# Patient Record
Sex: MALE | Race: WHITE | NOT HISPANIC OR LATINO | URBAN - METROPOLITAN AREA
[De-identification: names, ages, dates, MRNs, and addresses within clinical notes are randomized per-mention and may not be internally consistent; named-entity substitution may affect disease eponyms.]

---

## 2017-06-19 ENCOUNTER — INPATIENT (INPATIENT)
Facility: HOSPITAL | Age: 37
LOS: 0 days | Discharge: HOME | End: 2017-06-20
Attending: HOSPITALIST

## 2017-06-19 DIAGNOSIS — K44.9 DIAPHRAGMATIC HERNIA WITHOUT OBSTRUCTION OR GANGRENE: ICD-10-CM

## 2017-06-19 DIAGNOSIS — F13.10 SEDATIVE, HYPNOTIC OR ANXIOLYTIC ABUSE, UNCOMPLICATED: ICD-10-CM

## 2017-06-19 DIAGNOSIS — F17.200 NICOTINE DEPENDENCE, UNSPECIFIED, UNCOMPLICATED: ICD-10-CM

## 2017-06-19 DIAGNOSIS — K25.4 CHRONIC OR UNSPECIFIED GASTRIC ULCER WITH HEMORRHAGE: ICD-10-CM

## 2017-06-19 DIAGNOSIS — F11.20 OPIOID DEPENDENCE, UNCOMPLICATED: ICD-10-CM

## 2017-06-19 DIAGNOSIS — K27.9 PEPTIC ULCER, SITE UNSPECIFIED, UNSPECIFIED AS ACUTE OR CHRONIC, WITHOUT HEMORRHAGE OR PERFORATION: ICD-10-CM

## 2017-06-19 DIAGNOSIS — F19.21 OTHER PSYCHOACTIVE SUBSTANCE DEPENDENCE, IN REMISSION: ICD-10-CM

## 2017-06-19 DIAGNOSIS — K29.70 GASTRITIS, UNSPECIFIED, WITHOUT BLEEDING: ICD-10-CM

## 2017-08-17 ENCOUNTER — EMERGENCY (EMERGENCY)
Facility: HOSPITAL | Age: 37
LOS: 0 days | Discharge: HOME | End: 2017-08-17
Admitting: INTERNAL MEDICINE

## 2017-08-17 DIAGNOSIS — Z87.19 PERSONAL HISTORY OF OTHER DISEASES OF THE DIGESTIVE SYSTEM: ICD-10-CM

## 2017-08-17 DIAGNOSIS — F17.200 NICOTINE DEPENDENCE, UNSPECIFIED, UNCOMPLICATED: ICD-10-CM

## 2017-08-17 DIAGNOSIS — Z79.899 OTHER LONG TERM (CURRENT) DRUG THERAPY: ICD-10-CM

## 2017-08-17 DIAGNOSIS — F13.10 SEDATIVE, HYPNOTIC OR ANXIOLYTIC ABUSE, UNCOMPLICATED: ICD-10-CM

## 2017-08-17 DIAGNOSIS — F11.20 OPIOID DEPENDENCE, UNCOMPLICATED: ICD-10-CM

## 2017-08-17 DIAGNOSIS — K27.9 PEPTIC ULCER, SITE UNSPECIFIED, UNSPECIFIED AS ACUTE OR CHRONIC, WITHOUT HEMORRHAGE OR PERFORATION: ICD-10-CM

## 2017-08-17 DIAGNOSIS — R11.2 NAUSEA WITH VOMITING, UNSPECIFIED: ICD-10-CM

## 2017-08-17 DIAGNOSIS — K29.70 GASTRITIS, UNSPECIFIED, WITHOUT BLEEDING: ICD-10-CM

## 2017-08-17 DIAGNOSIS — K44.9 DIAPHRAGMATIC HERNIA WITHOUT OBSTRUCTION OR GANGRENE: ICD-10-CM

## 2017-08-17 DIAGNOSIS — K92.1 MELENA: ICD-10-CM

## 2017-08-22 ENCOUNTER — INPATIENT (INPATIENT)
Facility: HOSPITAL | Age: 37
LOS: 1 days | Discharge: HOME | End: 2017-08-24
Attending: INTERNAL MEDICINE | Admitting: INTERNAL MEDICINE

## 2017-08-22 DIAGNOSIS — F17.200 NICOTINE DEPENDENCE, UNSPECIFIED, UNCOMPLICATED: ICD-10-CM

## 2017-08-22 DIAGNOSIS — K27.9 PEPTIC ULCER, SITE UNSPECIFIED, UNSPECIFIED AS ACUTE OR CHRONIC, WITHOUT HEMORRHAGE OR PERFORATION: ICD-10-CM

## 2017-08-22 DIAGNOSIS — K44.9 DIAPHRAGMATIC HERNIA WITHOUT OBSTRUCTION OR GANGRENE: ICD-10-CM

## 2017-08-22 DIAGNOSIS — F11.20 OPIOID DEPENDENCE, UNCOMPLICATED: ICD-10-CM

## 2017-08-22 DIAGNOSIS — K29.70 GASTRITIS, UNSPECIFIED, WITHOUT BLEEDING: ICD-10-CM

## 2017-08-22 DIAGNOSIS — F13.10 SEDATIVE, HYPNOTIC OR ANXIOLYTIC ABUSE, UNCOMPLICATED: ICD-10-CM

## 2017-08-29 DIAGNOSIS — K22.70 BARRETT'S ESOPHAGUS WITHOUT DYSPLASIA: ICD-10-CM
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
## 2017-08-29 DIAGNOSIS — F11.20 OPIOID DEPENDENCE, UNCOMPLICATED: ICD-10-CM

## 2017-08-29 DIAGNOSIS — K29.51 UNSPECIFIED CHRONIC GASTRITIS WITH BLEEDING: ICD-10-CM

## 2017-08-29 DIAGNOSIS — F41.8 OTHER SPECIFIED ANXIETY DISORDERS: ICD-10-CM

## 2017-08-29 DIAGNOSIS — Z87.11 PERSONAL HISTORY OF PEPTIC ULCER DISEASE: ICD-10-CM

## 2017-08-29 DIAGNOSIS — I95.2 HYPOTENSION DUE TO DRUGS: ICD-10-CM

## 2017-08-29 DIAGNOSIS — D50.0 IRON DEFICIENCY ANEMIA SECONDARY TO BLOOD LOSS (CHRONIC): ICD-10-CM

## 2017-08-29 DIAGNOSIS — T42.8X1A POISONING BY ANTIPARKINSONISM DRUGS AND OTHER CENTRAL MUSCLE-TONE DEPRESSANTS, ACCIDENTAL (UNINTENTIONAL), INITIAL ENCOUNTER: ICD-10-CM

## 2017-08-29 DIAGNOSIS — K44.9 DIAPHRAGMATIC HERNIA WITHOUT OBSTRUCTION OR GANGRENE: ICD-10-CM

## 2017-08-29 DIAGNOSIS — M54.9 DORSALGIA, UNSPECIFIED: ICD-10-CM

## 2017-08-29 DIAGNOSIS — T39.315A ADVERSE EFFECT OF PROPIONIC ACID DERIVATIVES, INITIAL ENCOUNTER: ICD-10-CM

## 2017-08-29 DIAGNOSIS — G89.4 CHRONIC PAIN SYNDROME: ICD-10-CM

## 2017-08-31 ENCOUNTER — EMERGENCY (EMERGENCY)
Facility: HOSPITAL | Age: 37
LOS: 1 days | Discharge: HOME | End: 2017-08-31
Attending: EMERGENCY MEDICINE | Admitting: EMERGENCY MEDICINE
Payer: MEDICAID

## 2017-08-31 VITALS
TEMPERATURE: 99 F | SYSTOLIC BLOOD PRESSURE: 140 MMHG | RESPIRATION RATE: 16 BRPM | DIASTOLIC BLOOD PRESSURE: 91 MMHG | OXYGEN SATURATION: 100 % | HEART RATE: 120 BPM

## 2017-08-31 LAB
ALBUMIN SERPL ELPH-MCNC: 3.7 G/DL — SIGNIFICANT CHANGE UP (ref 3.3–5)
ALP SERPL-CCNC: 49 U/L — SIGNIFICANT CHANGE UP (ref 40–120)
ALT FLD-CCNC: 28 U/L — SIGNIFICANT CHANGE UP (ref 4–41)
APAP SERPL-MCNC: < 15 UG/ML — LOW (ref 15–25)
AST SERPL-CCNC: 19 U/L — SIGNIFICANT CHANGE UP (ref 4–40)
BARBITURATES MEASUREMENT: NEGATIVE — SIGNIFICANT CHANGE UP
BASOPHILS # BLD AUTO: 0.08 K/UL — SIGNIFICANT CHANGE UP (ref 0–0.2)
BASOPHILS NFR BLD AUTO: 0.7 % — SIGNIFICANT CHANGE UP (ref 0–2)
BENZODIAZ SERPL-MCNC: NEGATIVE — SIGNIFICANT CHANGE UP
BILIRUB SERPL-MCNC: 0.2 MG/DL — SIGNIFICANT CHANGE UP (ref 0.2–1.2)
BUN SERPL-MCNC: 17 MG/DL — SIGNIFICANT CHANGE UP (ref 7–23)
CALCIUM SERPL-MCNC: 9.4 MG/DL — SIGNIFICANT CHANGE UP (ref 8.4–10.5)
CHLORIDE SERPL-SCNC: 105 MMOL/L — SIGNIFICANT CHANGE UP (ref 98–107)
CK MB BLD-MCNC: 1.01 NG/ML — SIGNIFICANT CHANGE UP (ref 1–6.6)
CK SERPL-CCNC: 138 U/L — SIGNIFICANT CHANGE UP (ref 30–200)
CO2 SERPL-SCNC: 22 MMOL/L — SIGNIFICANT CHANGE UP (ref 22–31)
CREAT SERPL-MCNC: 1.1 MG/DL — SIGNIFICANT CHANGE UP (ref 0.5–1.3)
EOSINOPHIL # BLD AUTO: 0.11 K/UL — SIGNIFICANT CHANGE UP (ref 0–0.5)
EOSINOPHIL NFR BLD AUTO: 1 % — SIGNIFICANT CHANGE UP (ref 0–6)
ETHANOL BLD-MCNC: < 10 MG/DL — SIGNIFICANT CHANGE UP
GLUCOSE SERPL-MCNC: 106 MG/DL — HIGH (ref 70–99)
HCT VFR BLD CALC: 36.7 % — LOW (ref 39–50)
HGB BLD-MCNC: 11.1 G/DL — LOW (ref 13–17)
IMM GRANULOCYTES # BLD AUTO: 0.09 # — SIGNIFICANT CHANGE UP
IMM GRANULOCYTES NFR BLD AUTO: 0.8 % — SIGNIFICANT CHANGE UP (ref 0–1.5)
LYMPHOCYTES # BLD AUTO: 26.9 % — SIGNIFICANT CHANGE UP (ref 13–44)
LYMPHOCYTES # BLD AUTO: 3.08 K/UL — SIGNIFICANT CHANGE UP (ref 1–3.3)
MCHC RBC-ENTMCNC: 23.5 PG — LOW (ref 27–34)
MCHC RBC-ENTMCNC: 30.2 % — LOW (ref 32–36)
MCV RBC AUTO: 77.6 FL — LOW (ref 80–100)
MONOCYTES # BLD AUTO: 1.24 K/UL — HIGH (ref 0–0.9)
MONOCYTES NFR BLD AUTO: 10.8 % — SIGNIFICANT CHANGE UP (ref 2–14)
NEUTROPHILS # BLD AUTO: 6.87 K/UL — SIGNIFICANT CHANGE UP (ref 1.8–7.4)
NEUTROPHILS NFR BLD AUTO: 59.8 % — SIGNIFICANT CHANGE UP (ref 43–77)
NRBC # FLD: 0 — SIGNIFICANT CHANGE UP
PLATELET # BLD AUTO: 459 K/UL — HIGH (ref 150–400)
PMV BLD: 11.3 FL — SIGNIFICANT CHANGE UP (ref 7–13)
POTASSIUM SERPL-MCNC: 4 MMOL/L — SIGNIFICANT CHANGE UP (ref 3.5–5.3)
POTASSIUM SERPL-SCNC: 4 MMOL/L — SIGNIFICANT CHANGE UP (ref 3.5–5.3)
PROT SERPL-MCNC: 6.6 G/DL — SIGNIFICANT CHANGE UP (ref 6–8.3)
RBC # BLD: 4.73 M/UL — SIGNIFICANT CHANGE UP (ref 4.2–5.8)
RBC # FLD: 18.9 % — HIGH (ref 10.3–14.5)
SALICYLATES SERPL-MCNC: < 5 MG/DL — LOW (ref 15–30)
SODIUM SERPL-SCNC: 142 MMOL/L — SIGNIFICANT CHANGE UP (ref 135–145)
TROPONIN T SERPL-MCNC: < 0.06 NG/ML — SIGNIFICANT CHANGE UP (ref 0–0.06)
WBC # BLD: 11.47 K/UL — HIGH (ref 3.8–10.5)
WBC # FLD AUTO: 11.47 K/UL — HIGH (ref 3.8–10.5)

## 2017-08-31 PROCEDURE — 99285 EMERGENCY DEPT VISIT HI MDM: CPT

## 2017-08-31 RX ORDER — SODIUM CHLORIDE 9 MG/ML
1000 INJECTION INTRAMUSCULAR; INTRAVENOUS; SUBCUTANEOUS ONCE
Qty: 0 | Refills: 0 | Status: COMPLETED | OUTPATIENT
Start: 2017-08-31 | End: 2017-08-31

## 2017-08-31 RX ADMIN — SODIUM CHLORIDE 1000 MILLILITER(S): 9 INJECTION INTRAMUSCULAR; INTRAVENOUS; SUBCUTANEOUS at 23:40

## 2017-08-31 NOTE — ED PROVIDER NOTE - PROGRESS NOTE DETAILS
Pt reports he is feeling well. d/c was pending on 2nd set of cardiac enzymes. Trop negative, EKG unchanged. Pt reports he has a safe way to transport home, will contact his  tomorrow to aid in proving a new treatment facility   Romina Rubin, PGY-1 EM

## 2017-08-31 NOTE — ED ADULT TRIAGE NOTE - CHIEF COMPLAINT QUOTE
Pt brought in by EMS, pt was denied from rehab center because of being tachycardic. Pt denies cp/discomfort, denies headache/dizziness. Pt denies sob breathing even and unlabored. Pt appears diaphoretic, mildly anxious.

## 2017-08-31 NOTE — ED ADULT NURSE NOTE - CHPI ED SYMPTOMS NEG
no nausea/no abdominal distension/no fever/no chills/no weakness/no disorientation/no pain/no vomiting/no confusion/no abdominal pain

## 2017-08-31 NOTE — ED PROVIDER NOTE - MEDICAL DECISION MAKING DETAILS
Pt is a 37 yo M longstanding hx of opoid abuse (recently finished methadone detox on 8/30) at [facility] in  with plans to start day program at [facility] today, was sent here by rehab center due to tachycardia on entrance vitals. 125 on  triage here, down to 97 now. Pt appears red in the face/anterior chest/neck but says this is his baseline. Does not appear to be in wd. Denies chest pain. EKG shows non specific T wave abnormalities with no prior for comparison.   -Cardiac enzymes, repeat EKG, cbc/cmp/tox/tsh  Romina Rubin, PGY-1 EM

## 2017-08-31 NOTE — ED ADULT NURSE NOTE - OBJECTIVE STATEMENT
Patient is bought in room 7, alert and oriented x 4, appears anxious, respirations even and unlabored, normal sinus rhythm/sinus tachycardia on cardiac monitor. Blood drawn and sent. Will follow up and monitor.

## 2017-08-31 NOTE — ED PROVIDER NOTE - ATTENDING CONTRIBUTION TO CARE
Martinez: 36 yom sent to ED for tachycardia from residential facility.  Pt recently underwent detox for heroin as inpatient but now none for 1 week.  PT states he feels ill but denies any cp, palpitations, confusion, shaking, abd pain, fever.  On exam, pt is awake, alert cooperative and pleasant.  Hr now decreased to 103.  PERRL, EOMI, clear lungs, normal cardiac, abd soft, no tenderness, slight tremor in hands but no asterixus.  Neuro intact.  EKG shows tachycardia and flipped t wavesc.  Labs, fluids, TSH, no evidence of withdrwawal.  REassess.

## 2017-08-31 NOTE — ED PROVIDER NOTE - OBJECTIVE STATEMENT
Pt is a 37 yo M longstanding hx of opoid abuse (recently finished methadone detox on 8/30) at [facility] in  with plans to start day program at [facility] today, was sent here by rehab center due to tachycardia on entrance vitals. Pt reports he was using 150-200 mg oxycodone/day every day for years. 1 prior trial of detox in February. Pt reports also using cocaine and heroin in the past but nothing for months. No Etoh use, daily nicotine. Reports that he feels generally unwell from the detox, stomach pains/yawning, but nothing that seems out of the ordinary. Reports he always feels hot but that this has been his baseline. Denies any chest pain, shortness of breath, fevers, headaches, or any other complaints. No heart hx or family heart hx.

## 2017-09-01 VITALS
DIASTOLIC BLOOD PRESSURE: 96 MMHG | TEMPERATURE: 98 F | HEART RATE: 82 BPM | SYSTOLIC BLOOD PRESSURE: 152 MMHG | RESPIRATION RATE: 21 BRPM | OXYGEN SATURATION: 100 %

## 2017-09-01 LAB
AMPHET UR-MCNC: NEGATIVE — SIGNIFICANT CHANGE UP
BARBITURATES UR SCN-MCNC: NEGATIVE — SIGNIFICANT CHANGE UP
BENZODIAZ UR-MCNC: NEGATIVE — SIGNIFICANT CHANGE UP
CANNABINOIDS UR-MCNC: NEGATIVE — SIGNIFICANT CHANGE UP
COCAINE METAB.OTHER UR-MCNC: NEGATIVE — SIGNIFICANT CHANGE UP
METHADONE UR-MCNC: POSITIVE — SIGNIFICANT CHANGE UP
OPIATES UR-MCNC: NEGATIVE — SIGNIFICANT CHANGE UP
OXYCODONE UR-MCNC: NEGATIVE — SIGNIFICANT CHANGE UP
PCP UR-MCNC: NEGATIVE — SIGNIFICANT CHANGE UP
TROPONIN T SERPL-MCNC: < 0.06 NG/ML — SIGNIFICANT CHANGE UP (ref 0–0.06)
TSH SERPL-MCNC: 0.88 UIU/ML — SIGNIFICANT CHANGE UP (ref 0.27–4.2)

## 2017-09-01 RX ADMIN — Medication 1 MILLIGRAM(S): at 01:00

## 2017-09-06 ENCOUNTER — INPATIENT (INPATIENT)
Facility: HOSPITAL | Age: 37
LOS: 7 days | Discharge: HOME | End: 2017-09-14
Attending: INTERNAL MEDICINE

## 2017-09-06 DIAGNOSIS — F17.200 NICOTINE DEPENDENCE, UNSPECIFIED, UNCOMPLICATED: ICD-10-CM

## 2017-09-06 DIAGNOSIS — K44.9 DIAPHRAGMATIC HERNIA WITHOUT OBSTRUCTION OR GANGRENE: ICD-10-CM

## 2017-09-06 DIAGNOSIS — F11.20 OPIOID DEPENDENCE, UNCOMPLICATED: ICD-10-CM

## 2017-09-06 DIAGNOSIS — K27.9 PEPTIC ULCER, SITE UNSPECIFIED, UNSPECIFIED AS ACUTE OR CHRONIC, WITHOUT HEMORRHAGE OR PERFORATION: ICD-10-CM

## 2017-09-06 DIAGNOSIS — F13.10 SEDATIVE, HYPNOTIC OR ANXIOLYTIC ABUSE, UNCOMPLICATED: ICD-10-CM

## 2017-09-06 DIAGNOSIS — K29.70 GASTRITIS, UNSPECIFIED, WITHOUT BLEEDING: ICD-10-CM

## 2017-09-17 DIAGNOSIS — K29.70 GASTRITIS, UNSPECIFIED, WITHOUT BLEEDING: ICD-10-CM

## 2017-09-17 DIAGNOSIS — K21.9 GASTRO-ESOPHAGEAL REFLUX DISEASE WITHOUT ESOPHAGITIS: ICD-10-CM

## 2017-09-17 DIAGNOSIS — F11.20 OPIOID DEPENDENCE, UNCOMPLICATED: ICD-10-CM

## 2017-09-17 DIAGNOSIS — K44.9 DIAPHRAGMATIC HERNIA WITHOUT OBSTRUCTION OR GANGRENE: ICD-10-CM

## 2017-09-17 DIAGNOSIS — K27.9 PEPTIC ULCER, SITE UNSPECIFIED, UNSPECIFIED AS ACUTE OR CHRONIC, WITHOUT HEMORRHAGE OR PERFORATION: ICD-10-CM

## 2017-09-17 DIAGNOSIS — F13.20 SEDATIVE, HYPNOTIC OR ANXIOLYTIC DEPENDENCE, UNCOMPLICATED: ICD-10-CM

## 2017-09-17 DIAGNOSIS — Z51.89 ENCOUNTER FOR OTHER SPECIFIED AFTERCARE: ICD-10-CM

## 2017-09-17 DIAGNOSIS — F17.210 NICOTINE DEPENDENCE, CIGARETTES, UNCOMPLICATED: ICD-10-CM

## 2019-10-01 ENCOUNTER — APPOINTMENT (OUTPATIENT)
Dept: HUMAN REPRODUCTION | Facility: CLINIC | Age: 39
End: 2019-10-01

## 2020-09-04 ENCOUNTER — EMERGENCY (EMERGENCY)
Facility: HOSPITAL | Age: 40
LOS: 0 days | Discharge: HOME | End: 2020-09-05
Attending: EMERGENCY MEDICINE | Admitting: EMERGENCY MEDICINE
Payer: MEDICAID

## 2020-09-04 VITALS
RESPIRATION RATE: 18 BRPM | HEART RATE: 110 BPM | TEMPERATURE: 98 F | DIASTOLIC BLOOD PRESSURE: 67 MMHG | OXYGEN SATURATION: 100 % | SYSTOLIC BLOOD PRESSURE: 118 MMHG

## 2020-09-04 VITALS — OXYGEN SATURATION: 95 % | RESPIRATION RATE: 17 BRPM

## 2020-09-04 DIAGNOSIS — F11.10 OPIOID ABUSE, UNCOMPLICATED: ICD-10-CM

## 2020-09-04 DIAGNOSIS — T40.1X1A POISONING BY HEROIN, ACCIDENTAL (UNINTENTIONAL), INITIAL ENCOUNTER: ICD-10-CM

## 2020-09-04 LAB
ALBUMIN SERPL ELPH-MCNC: 4.1 G/DL — SIGNIFICANT CHANGE UP (ref 3.5–5.2)
ALP SERPL-CCNC: 69 U/L — SIGNIFICANT CHANGE UP (ref 30–115)
ALT FLD-CCNC: 19 U/L — SIGNIFICANT CHANGE UP (ref 0–41)
ANION GAP SERPL CALC-SCNC: 15 MMOL/L — HIGH (ref 7–14)
AST SERPL-CCNC: 23 U/L — SIGNIFICANT CHANGE UP (ref 0–41)
BASOPHILS # BLD AUTO: 0.07 K/UL — SIGNIFICANT CHANGE UP (ref 0–0.2)
BASOPHILS NFR BLD AUTO: 0.8 % — SIGNIFICANT CHANGE UP (ref 0–1)
BILIRUB SERPL-MCNC: <0.2 MG/DL — SIGNIFICANT CHANGE UP (ref 0.2–1.2)
BUN SERPL-MCNC: 15 MG/DL — SIGNIFICANT CHANGE UP (ref 10–20)
CALCIUM SERPL-MCNC: 8.7 MG/DL — SIGNIFICANT CHANGE UP (ref 8.5–10.1)
CHLORIDE SERPL-SCNC: 95 MMOL/L — LOW (ref 98–110)
CK SERPL-CCNC: 324 U/L — HIGH (ref 0–225)
CO2 SERPL-SCNC: 27 MMOL/L — SIGNIFICANT CHANGE UP (ref 17–32)
CREAT SERPL-MCNC: 1.3 MG/DL — SIGNIFICANT CHANGE UP (ref 0.7–1.5)
EOSINOPHIL # BLD AUTO: 0.23 K/UL — SIGNIFICANT CHANGE UP (ref 0–0.7)
EOSINOPHIL NFR BLD AUTO: 2.6 % — SIGNIFICANT CHANGE UP (ref 0–8)
GLUCOSE SERPL-MCNC: 135 MG/DL — HIGH (ref 70–99)
HCT VFR BLD CALC: 31 % — LOW (ref 42–52)
HGB BLD-MCNC: 9.1 G/DL — LOW (ref 14–18)
IMM GRANULOCYTES NFR BLD AUTO: 1.2 % — HIGH (ref 0.1–0.3)
LYMPHOCYTES # BLD AUTO: 1.31 K/UL — SIGNIFICANT CHANGE UP (ref 1.2–3.4)
LYMPHOCYTES # BLD AUTO: 15.1 % — LOW (ref 20.5–51.1)
MAGNESIUM SERPL-MCNC: 1.9 MG/DL — SIGNIFICANT CHANGE UP (ref 1.8–2.4)
MCHC RBC-ENTMCNC: 23.5 PG — LOW (ref 27–31)
MCHC RBC-ENTMCNC: 29.4 G/DL — LOW (ref 32–37)
MCV RBC AUTO: 80.1 FL — SIGNIFICANT CHANGE UP (ref 80–94)
MONOCYTES # BLD AUTO: 0.3 K/UL — SIGNIFICANT CHANGE UP (ref 0.1–0.6)
MONOCYTES NFR BLD AUTO: 3.5 % — SIGNIFICANT CHANGE UP (ref 1.7–9.3)
NEUTROPHILS # BLD AUTO: 6.67 K/UL — HIGH (ref 1.4–6.5)
NEUTROPHILS NFR BLD AUTO: 76.8 % — HIGH (ref 42.2–75.2)
NRBC # BLD: 0 /100 WBCS — SIGNIFICANT CHANGE UP (ref 0–0)
PLATELET # BLD AUTO: 366 K/UL — SIGNIFICANT CHANGE UP (ref 130–400)
POTASSIUM SERPL-MCNC: 3.8 MMOL/L — SIGNIFICANT CHANGE UP (ref 3.5–5)
POTASSIUM SERPL-SCNC: 3.8 MMOL/L — SIGNIFICANT CHANGE UP (ref 3.5–5)
PROT SERPL-MCNC: 6.2 G/DL — SIGNIFICANT CHANGE UP (ref 6–8)
RBC # BLD: 3.87 M/UL — LOW (ref 4.7–6.1)
RBC # FLD: 14.8 % — HIGH (ref 11.5–14.5)
SODIUM SERPL-SCNC: 137 MMOL/L — SIGNIFICANT CHANGE UP (ref 135–146)
TROPONIN T SERPL-MCNC: <0.01 NG/ML — SIGNIFICANT CHANGE UP
WBC # BLD: 8.68 K/UL — SIGNIFICANT CHANGE UP (ref 4.8–10.8)
WBC # FLD AUTO: 8.68 K/UL — SIGNIFICANT CHANGE UP (ref 4.8–10.8)

## 2020-09-04 PROCEDURE — 71045 X-RAY EXAM CHEST 1 VIEW: CPT | Mod: 26

## 2020-09-04 PROCEDURE — 99285 EMERGENCY DEPT VISIT HI MDM: CPT

## 2020-09-04 PROCEDURE — 93010 ELECTROCARDIOGRAM REPORT: CPT

## 2020-09-04 RX ORDER — SODIUM CHLORIDE 9 MG/ML
1000 INJECTION, SOLUTION INTRAVENOUS ONCE
Refills: 0 | Status: COMPLETED | OUTPATIENT
Start: 2020-09-04 | End: 2020-09-04

## 2020-09-04 RX ADMIN — SODIUM CHLORIDE 1000 MILLILITER(S): 9 INJECTION, SOLUTION INTRAVENOUS at 22:26

## 2020-09-04 NOTE — ED ADULT NURSE NOTE - OBJECTIVE STATEMENT
Pt was BIBA s/o overdose at home. Pt was found in bathroom unresponsive, 2 4mg narcan given in the field. Pt responsive on arrival, o2 sat 87% on RA. Pt denies pmh and nkda.

## 2020-09-04 NOTE — ED PROVIDER NOTE - NSFOLLOWUPINSTRUCTIONS_ED_ALL_ED_FT
Opioid Overdose      Opioids are substances that relieve pain by binding to pain receptors in your brain and spinal cord. Opioids include illegal drugs, such as heroin, as well as prescription pain medicines. An opioid overdose happens when you take too much of an opioid substance. This can happen with any type of opioid, including:    Heroin.  Morphine.  Codeine.  Methadone.  Oxycodone.  Hydrocodone.  Fentanyl.  Hydromorphone.  Buprenorphine.    The effects of an overdose can be mild, dangerous, or even deadly. Opioid overdose is a medical emergency.      What are the causes?    This condition may be caused by:    Taking too much of an opioid by accident.  Taking too much of an opioid on purpose.  An error made by a health care provider who prescribes a medicine.  An error made by the pharmacist who fills the prescription order.  Using more than one substance that contains opioids at the same time.  Mixing an opioid with a substance that affects your heart, breathing, or blood pressure. These include alcohol, tranquilizers, sleeping pills, illegal drugs, and some over-the-counter medicines.      What increases the risk?    This condition is more likely in:    Children. They may be attracted to colorful pills. Because of a child's small size, even a small amount of a drug can be dangerous.  Elderly people. They may be taking many different drugs. Elderly people may have difficulty reading labels or remembering when they last took their medicine.  People who take an opioid on a long-term basis.    People who use:    Illegal drugs.  Other substances, including alcohol, while using an opioid.    People who have:    A history of drug or alcohol abuse.  Certain mental health conditions.  People who take opioids that are not prescribed for them.      What are the signs or symptoms?    Symptoms of this condition depend on the type of opioid and the amount that was taken. Common symptoms include:    Sleepiness or difficulty waking from sleep.  Confusion.  Slurred speech.  Slowed breathing and a slow pulse.  Nausea and vomiting.  Abnormally small pupils.    Signs and symptoms that require emergency treatment include:    Cold, clammy, and pale skin.  Blue lips and fingernails.  Vomiting.  Gurgling sounds in the throat.  A pulse that is very slow or difficult to detect.  Breathing that is very slow, noisy, or difficult to detect.  Limp body.  Inability to respond to speech or be awakened from sleep (stupor).      How is this diagnosed?    This condition is diagnosed based on your symptoms. It is important to tell your health care provider:    All of the opioids that you took.  When you took the opioids.  Whether you were drinking alcohol or using other substances.    Your health care provider will do a physical exam. This exam may include:    Checking and monitoring your heart rate and rhythm, your breathing rate and depth, your temperature, and your blood pressure (vital signs).  Checking for abnormally small pupils.  Measuring oxygen levels in your blood.  You may also have blood tests or urine tests.    How is this treated?    Supporting your vital signs and your breathing is the first step in treating an opioid overdose. Treatment may also include:    Giving fluids and minerals (electrolytes) through an IV tube.  Inserting a breathing tube (endotracheal tube) in your airway to help you breathe.  Giving oxygen.  Passing a tube through your nose and into your stomach (NG tube, or nasogastric tube) to wash out your stomach.    Giving medicines that:    Increase your blood pressure.  Absorb any opioid that is in your digestive system.  Reverse the effects of the opioid (naloxone).  Ongoing counseling and mental health support if you intentionally overdosed or used an illegal drug.    Follow these instructions at home:     Take over-the-counter and prescription medicines only as told by your health care provider. Always ask your health care provider about possible side effects and interactions of any new medicine that you start taking.  Keep a list of all of the medicines that you take, including over-the-counter medicines. Bring this list with you to all of your medical visits.  Drink enough fluid to keep your urine clear or pale yellow.  Keep all follow-up visits as told by your health care provider. This is important.    How is this prevented?    Get help if you are struggling with:    Alcohol or drug use.  Depression or another mental health problem.  Keep the phone number of your local poison control center near your phone or on your cell phone.  Store all medicines in safety containers that are out of the reach of children.  Read the drug inserts that come with your medicines.  Do not drink alcohol when taking opioids.  Do not use illegal drugs.  Do not take opioid medicines that are not prescribed for you.    Contact a health care provider if:    Your symptoms return.  You develop new symptoms or side effects when you are taking medicines.    Get help right away if:    You think that you or someone else may have taken too much of an opioid. The hotline of the National Poison Control Center is (675) 591-3550.  You or someone else is having symptoms of an opioid overdose.  You have serious thoughts about hurting yourself or others.    You have:    Chest pain.  Difficulty breathing.  A loss of consciousness.  Opioid overdose is an emergency. Do not wait to see if the symptoms will go away. Get medical help right away. Call your local emergency services (911 in the [place] Do not drive yourself to the hospital.     This information is not intended to replace advice given to you by your health care provider. Make sure you discuss any questions you have with your health care provider. Substance Abuse    Chemical dependency is an addiction to drugs or alcohol. It is characterized by the repeated behavior of seeking out and using drugs and alcohol despite harmful consequences to the health and safety of oneself and others. Using drugs in a manner that brought you to an Emergency Room suggests you may have an drug abuse problem. Seek help at a drug addiction center.    SEEK IMMEDIATE MEDICAL CARE IF YOU HAVE ANY OF THE FOLLOWING SYMPTOMS: chest pain, shortness of breath, change in mental status, thoughts about hurting killing yourself, thoughts about hurting or killing somebody else, hallucinations, or worsening depression.

## 2020-09-04 NOTE — ED PROVIDER NOTE - PROGRESS NOTE DETAILS
[facility] called. Pt feeling great - eating/walking without difficulty or desaturation - wants to go home - discussed sobriety and detox

## 2020-09-04 NOTE — ED PROVIDER NOTE - PHYSICAL EXAMINATION
Vital Signs: I have reviewed the initial vital signs.  Constitutional: well-nourished, appears stated age, no acute distress.  HEENT: Airway patent, protected.  EOMI, PERRLA.  CV: regular rate, regular rhythm, well-perfused extremities, 2+ b/l DP and radial pulses equal.  Lungs: BCTA, no increased WOB.  ABD: soft, NTND, no guarding or rebound, no pulsatile mass, no hernias.   MSK: Neck supple, nontender, nl ROM, no stepoff. Chest nontender. Back nontender in TLS spine or to b/l bony structures or flanks. Ext nontender, nl rom, no deformity.   INTEG: Skin warm, dry, no rash.  NEURO: A&Ox3, moving all extremities, tired speech  PSYCH: Calm, cooperative, normal affect and interaction.

## 2020-09-04 NOTE — ED PROVIDER NOTE - ATTENDING CONTRIBUTION TO CARE
Pt is a 38yo male who comes in s/p non-fatal opioid overdose from 2 bags heroin - unconsciousness with sonorous breaths witnessed by roommate - EMS arrived - pt blue with pulse and responded after 2mg IN then 2mg IV narcan.  Now awake without any complaints.  States he last used Methadone 3 years ago.  Denies any other drug use.    Exam: RRR, CTAB, soft NT abdomen, normal neuro exam, cap reifll <2s, NAD  Plan: labs, xr, ekg

## 2020-09-04 NOTE — ED PROVIDER NOTE - CARE PLAN
Principal Discharge DX:	Heroin abuse Principal Discharge DX:	Opioid overdose, accidental or unintentional, initial encounter  Secondary Diagnosis:	Opioid use disorder

## 2020-09-04 NOTE — ED PROVIDER NOTE - NSFOLLOWUPCLINICS_GEN_ALL_ED_FT
[facility]  Detox Mgmt  [address] [place] [address]  Phone: [phone]  Fax:   Follow Up Time: 1-3 Days

## 2020-09-04 NOTE — ED ADULT TRIAGE NOTE - CHIEF COMPLAINT QUOTE
Patient BIBEMS at home, found unresponsive. Narcan 2 IN and 2 IV given. patient alert and oriented in triage.

## 2020-09-04 NOTE — ED PROVIDER NOTE - OBJECTIVE STATEMENT
39M hx of opioid abuse previously on methadone presents with opioid overdose. patient notes he took 2 bags of heroin, was then found to be less responsive by his friend who called EMS, received 2mg narcan IN then 2mg narcan IM 39M hx of opioid abuse previously on methadone presents with opioid overdose. patient notes he took 2 bags of heroin, was then found to be less responsive by his friend who called EMS, who noted that patient's sats were "in the single digits," received 2mg narcan IN then 2mg narcan IM. Patient now awake, alert, not complaining of any chest pain/shortness of breath, only endorses mild light-headedness. Denies trauma/falls, no other drug use including cocaine/pcp/meth/marijuana.

## 2020-09-04 NOTE — ED PROVIDER NOTE - PATIENT PORTAL LINK FT
You can access the FollowMyHealth Patient Portal offered by [facility] by registering at the following website: [email or web address]. By joining [facility]’s FollowMyHealth portal, you will also be able to view your health information using other applications (apps) compatible with our system.

## 2021-05-20 ENCOUNTER — INPATIENT (INPATIENT)
Facility: HOSPITAL | Age: 41
LOS: 3 days | Discharge: HOME | End: 2021-05-24
Attending: INTERNAL MEDICINE | Admitting: INTERNAL MEDICINE
Payer: MEDICAID

## 2021-05-20 VITALS
HEART RATE: 88 BPM | DIASTOLIC BLOOD PRESSURE: 62 MMHG | SYSTOLIC BLOOD PRESSURE: 134 MMHG | RESPIRATION RATE: 16 BRPM | OXYGEN SATURATION: 100 % | WEIGHT: 229.94 LBS | TEMPERATURE: 99 F | HEIGHT: 72 IN

## 2021-05-20 LAB
ALBUMIN SERPL ELPH-MCNC: 4 G/DL — SIGNIFICANT CHANGE UP (ref 3.5–5.2)
ALP SERPL-CCNC: 64 U/L — SIGNIFICANT CHANGE UP (ref 30–115)
ALT FLD-CCNC: 13 U/L — SIGNIFICANT CHANGE UP (ref 0–41)
ANION GAP SERPL CALC-SCNC: 11 MMOL/L — SIGNIFICANT CHANGE UP (ref 7–14)
ANISOCYTOSIS BLD QL: SIGNIFICANT CHANGE UP
APTT BLD: 29.9 SEC — SIGNIFICANT CHANGE UP (ref 27–39.2)
AST SERPL-CCNC: 14 U/L — SIGNIFICANT CHANGE UP (ref 0–41)
BASOPHILS # BLD AUTO: 0.07 K/UL — SIGNIFICANT CHANGE UP (ref 0–0.2)
BASOPHILS # BLD AUTO: 0.34 K/UL — HIGH (ref 0–0.2)
BASOPHILS NFR BLD AUTO: 0.8 % — SIGNIFICANT CHANGE UP (ref 0–1)
BASOPHILS NFR BLD AUTO: 5.2 % — HIGH (ref 0–1)
BILIRUB SERPL-MCNC: <0.2 MG/DL — SIGNIFICANT CHANGE UP (ref 0.2–1.2)
BLD GP AB SCN SERPL QL: SIGNIFICANT CHANGE UP
BUN SERPL-MCNC: 15 MG/DL — SIGNIFICANT CHANGE UP (ref 10–20)
CALCIUM SERPL-MCNC: 8.4 MG/DL — LOW (ref 8.5–10.1)
CHLORIDE SERPL-SCNC: 102 MMOL/L — SIGNIFICANT CHANGE UP (ref 98–110)
CO2 SERPL-SCNC: 24 MMOL/L — SIGNIFICANT CHANGE UP (ref 17–32)
CREAT SERPL-MCNC: 0.8 MG/DL — SIGNIFICANT CHANGE UP (ref 0.7–1.5)
DACRYOCYTES BLD QL SMEAR: SLIGHT — SIGNIFICANT CHANGE UP
ELLIPTOCYTES BLD QL SMEAR: SLIGHT — SIGNIFICANT CHANGE UP
EOSINOPHIL # BLD AUTO: 0.05 K/UL — SIGNIFICANT CHANGE UP (ref 0–0.7)
EOSINOPHIL # BLD AUTO: 0.3 K/UL — SIGNIFICANT CHANGE UP (ref 0–0.7)
EOSINOPHIL NFR BLD AUTO: 0.8 % — SIGNIFICANT CHANGE UP (ref 0–8)
EOSINOPHIL NFR BLD AUTO: 3.6 % — SIGNIFICANT CHANGE UP (ref 0–8)
GIANT PLATELETS BLD QL SMEAR: PRESENT — SIGNIFICANT CHANGE UP
GLUCOSE SERPL-MCNC: 89 MG/DL — SIGNIFICANT CHANGE UP (ref 70–99)
HCT VFR BLD CALC: 21.5 % — LOW (ref 42–52)
HCT VFR BLD CALC: 29.2 % — LOW (ref 42–52)
HGB BLD-MCNC: 5.6 G/DL — CRITICAL LOW (ref 14–18)
HGB BLD-MCNC: 8.3 G/DL — LOW (ref 14–18)
HYPOCHROMIA BLD QL: SIGNIFICANT CHANGE UP
IMM GRANULOCYTES NFR BLD AUTO: 0.5 % — HIGH (ref 0.1–0.3)
INR BLD: 1.13 RATIO — SIGNIFICANT CHANGE UP (ref 0.65–1.3)
IRON SATN MFR SERPL: 22 UG/DL — LOW (ref 35–150)
IRON SATN MFR SERPL: 6 % — LOW (ref 15–50)
LYMPHOCYTES # BLD AUTO: 1.03 K/UL — LOW (ref 1.2–3.4)
LYMPHOCYTES # BLD AUTO: 15.5 % — LOW (ref 20.5–51.1)
LYMPHOCYTES # BLD AUTO: 2.31 K/UL — SIGNIFICANT CHANGE UP (ref 1.2–3.4)
LYMPHOCYTES # BLD AUTO: 28 % — SIGNIFICANT CHANGE UP (ref 20.5–51.1)
MACROCYTES BLD QL: SIGNIFICANT CHANGE UP
MANUAL SMEAR VERIFICATION: SIGNIFICANT CHANGE UP
MCHC RBC-ENTMCNC: 16.2 PG — LOW (ref 27–31)
MCHC RBC-ENTMCNC: 19.4 PG — LOW (ref 27–31)
MCHC RBC-ENTMCNC: 26 G/DL — LOW (ref 32–37)
MCHC RBC-ENTMCNC: 28.4 G/DL — LOW (ref 32–37)
MCV RBC AUTO: 62.3 FL — LOW (ref 80–94)
MCV RBC AUTO: 68.4 FL — LOW (ref 80–94)
METAMYELOCYTES # FLD: 0.9 % — HIGH (ref 0–0)
MICROCYTES BLD QL: SLIGHT — SIGNIFICANT CHANGE UP
MONOCYTES # BLD AUTO: 0.46 K/UL — SIGNIFICANT CHANGE UP (ref 0.1–0.6)
MONOCYTES # BLD AUTO: 0.89 K/UL — HIGH (ref 0.1–0.6)
MONOCYTES NFR BLD AUTO: 10.8 % — HIGH (ref 1.7–9.3)
MONOCYTES NFR BLD AUTO: 6.9 % — SIGNIFICANT CHANGE UP (ref 1.7–9.3)
NEUTROPHILS # BLD AUTO: 4.63 K/UL — SIGNIFICANT CHANGE UP (ref 1.4–6.5)
NEUTROPHILS # BLD AUTO: 4.64 K/UL — SIGNIFICANT CHANGE UP (ref 1.4–6.5)
NEUTROPHILS NFR BLD AUTO: 56.3 % — SIGNIFICANT CHANGE UP (ref 42.2–75.2)
NEUTROPHILS NFR BLD AUTO: 69.8 % — SIGNIFICANT CHANGE UP (ref 42.2–75.2)
NRBC # BLD: 0 /100 WBCS — SIGNIFICANT CHANGE UP (ref 0–0)
PLAT MORPH BLD: ABNORMAL
PLATELET # BLD AUTO: 324 K/UL — SIGNIFICANT CHANGE UP (ref 130–400)
PLATELET # BLD AUTO: 369 K/UL — SIGNIFICANT CHANGE UP (ref 130–400)
POIKILOCYTOSIS BLD QL AUTO: SIGNIFICANT CHANGE UP
POLYCHROMASIA BLD QL SMEAR: SIGNIFICANT CHANGE UP
POTASSIUM SERPL-MCNC: 4.7 MMOL/L — SIGNIFICANT CHANGE UP (ref 3.5–5)
POTASSIUM SERPL-SCNC: 4.7 MMOL/L — SIGNIFICANT CHANGE UP (ref 3.5–5)
PROT SERPL-MCNC: 6.6 G/DL — SIGNIFICANT CHANGE UP (ref 6–8)
PROTHROM AB SERPL-ACNC: 13 SEC — HIGH (ref 9.95–12.87)
RBC # BLD: 3.45 M/UL — LOW (ref 4.7–6.1)
RBC # BLD: 4.27 M/UL — LOW (ref 4.7–6.1)
RBC # FLD: 19.9 % — HIGH (ref 11.5–14.5)
RBC # FLD: 25.9 % — HIGH (ref 11.5–14.5)
RBC BLD AUTO: ABNORMAL
SARS-COV-2 RNA SPEC QL NAA+PROBE: SIGNIFICANT CHANGE UP
SCHISTOCYTES BLD QL AUTO: SLIGHT — SIGNIFICANT CHANGE UP
SODIUM SERPL-SCNC: 137 MMOL/L — SIGNIFICANT CHANGE UP (ref 135–146)
STOMATOCYTES BLD QL SMEAR: SLIGHT — SIGNIFICANT CHANGE UP
TIBC SERPL-MCNC: 367 UG/DL — SIGNIFICANT CHANGE UP (ref 220–430)
UIBC SERPL-MCNC: 345 UG/DL — SIGNIFICANT CHANGE UP (ref 110–370)
VARIANT LYMPHS # BLD: 0.9 % — SIGNIFICANT CHANGE UP (ref 0–5)
WBC # BLD: 6.63 K/UL — SIGNIFICANT CHANGE UP (ref 4.8–10.8)
WBC # BLD: 8.25 K/UL — SIGNIFICANT CHANGE UP (ref 4.8–10.8)
WBC # FLD AUTO: 6.63 K/UL — SIGNIFICANT CHANGE UP (ref 4.8–10.8)
WBC # FLD AUTO: 8.25 K/UL — SIGNIFICANT CHANGE UP (ref 4.8–10.8)

## 2021-05-20 PROCEDURE — 71045 X-RAY EXAM CHEST 1 VIEW: CPT | Mod: 26

## 2021-05-20 PROCEDURE — 99285 EMERGENCY DEPT VISIT HI MDM: CPT

## 2021-05-20 PROCEDURE — 93010 ELECTROCARDIOGRAM REPORT: CPT

## 2021-05-20 RX ORDER — SODIUM CHLORIDE 9 MG/ML
1000 INJECTION, SOLUTION INTRAVENOUS
Refills: 0 | Status: DISCONTINUED | OUTPATIENT
Start: 2021-05-20 | End: 2021-05-22

## 2021-05-20 RX ORDER — PANTOPRAZOLE SODIUM 20 MG/1
40 TABLET, DELAYED RELEASE ORAL ONCE
Refills: 0 | Status: COMPLETED | OUTPATIENT
Start: 2021-05-20 | End: 2021-05-20

## 2021-05-20 RX ORDER — LANOLIN ALCOHOL/MO/W.PET/CERES
5 CREAM (GRAM) TOPICAL AT BEDTIME
Refills: 0 | Status: DISCONTINUED | OUTPATIENT
Start: 2021-05-20 | End: 2021-05-24

## 2021-05-20 RX ORDER — PANTOPRAZOLE SODIUM 20 MG/1
40 TABLET, DELAYED RELEASE ORAL
Refills: 0 | Status: DISCONTINUED | OUTPATIENT
Start: 2021-05-20 | End: 2021-05-22

## 2021-05-20 RX ADMIN — PANTOPRAZOLE SODIUM 40 MILLIGRAM(S): 20 TABLET, DELAYED RELEASE ORAL at 16:00

## 2021-05-20 NOTE — ED ADULT NURSE REASSESSMENT NOTE - NS ED NURSE REASSESS COMMENT FT1
2nd unit prbcs transfused as ordered. no s/s adverse reaction noted. v/s stable. see transfusion flow sheet for further documentation/vitals.

## 2021-05-20 NOTE — ED ADULT NURSE NOTE - OBJECTIVE STATEMENT
patient states he was sent in by PMD for hemoglobin of 5. states he feels weak and tired. denies chest pain/sob/dizziness. states it has been "months" since hes seen blood in his BMs.

## 2021-05-20 NOTE — H&P ADULT - NSHPSOCIALHISTORY_GEN_ALL_CORE
Patient was incarcerated at [facility] and released approx 8 months ago.  Smokes occasioally, takes oxycodone (illicit), denies EtOH use.

## 2021-05-20 NOTE — ED PROVIDER NOTE - CARE PLAN
Assessment and plan of treatment:	Plan: EKg, CXR, labs, consent for blood transfusion, reassess.   Principal Discharge DX:	GI bleed  Assessment and plan of treatment:	Plan: EKg, CXR, labs, consent for blood transfusion, reassess.   Principal Discharge DX:	Anemia  Assessment and plan of treatment:	Plan: EKg, CXR, labs, consent for blood transfusion, reassess.  Secondary Diagnosis:	PUD (peptic ulcer disease)

## 2021-05-20 NOTE — ED PROVIDER NOTE - PROGRESS NOTE DETAILS
MQ: Hgb 5.6, consented for blood, no blood in rectal exam, will give blood MQ: Giving 3 units now, will admit for GI bleed MQ: Giving 3 units now, will admit for anemia possible due to pt's hx of ulcer, not active bleeding based on exam.

## 2021-05-20 NOTE — H&P ADULT - HISTORY OF PRESENT ILLNESS
40 M PMH opoid use, peptic ulcer disease (last 15 years, found to have bleeding ulcer with Dr. Shah [facility] on endoscopy 1 month ago) presenting for anemia. Patient was feeling weak, tired, and getting easily fatigued. He states his friends told him he was looking pale. Went in for routine blood work, demonstrated hemoglobin 5 and patient was referred to emergency department. Patient does have previous history of melena and "throwing up blood" but denies needing previous transfusion. Patient was incarcerated at [facility] and released approx 8 months ago. He reports having his last endoscopy/colonoscopy 1 month ago with Dr. Shah, at time said to have found bleeding ulcers with a normal colonoscopy.      Denies EtOH abuse, fever, chill, chest pain, cough, abdominal pain, diarrhea, constipation, melena, hematochezia, hematemesis, hematuria.   In ED, VSS. Labs show hemoglobin 5.6 with prelim iron studies being consistent with SHEELA.  ADAM (-). Patient give IV protonix push and ordered for 3u pRBC.

## 2021-05-20 NOTE — ED PROVIDER NOTE - OBJECTIVE STATEMENT
Patient is a 39 yo male with PMHx of opioid use d/o, gastric ulcer c/o low hemoglobin. Patient is seeing a GI doctor at [facility] Dr. Lambert, had blood work done yesterday and got results today, Hgb was 5, so told to go to the ED for evaluation. Denies fever, chills, dizziness, LOC, chest pain, SOB, cough, abdominal pain, n/v/diarrhea. Denies hematuria, bleeding in the stool, or melena. Within past year, had colonoscopy and endoscopy done, only showed gastric ulcer, given sucralfate to use at home.

## 2021-05-20 NOTE — ED PROVIDER NOTE - CLINICAL SUMMARY MEDICAL DECISION MAKING FREE TEXT BOX
pt presented for anemia, hc of PUD, reports was told "bleeding ulcer" no melena or brbpr on exam. no vomiting of blood, hgb noted, consented for blood transfusion, medical admitting team aware of pt and admission as discussed and a chano with pt. pt presented for anemia, hc of PUD, reports was told "bleeding ulcer" no melena or brbpr on exam. no vomiting of blood, hgb noted, consented for blood transfusion, medical admitting team aware of pt and admission as discussed and agreed with pt.

## 2021-05-20 NOTE — ED ADULT NURSE REASSESSMENT NOTE - NS ED NURSE REASSESS COMMENT FT1
PRBCs transfusing as ordered. no s/s adverse reaction noted. v/s stable. see transfusion flow sheet for further documentation/vitals.

## 2021-05-20 NOTE — ED PROVIDER NOTE - PHYSICAL EXAMINATION
CONSTITUTIONAL: Well-developed; well-nourished; in no acute distress.   SKIN: warm, dry  HEAD: Normocephalic; atraumatic.  EYES: no conjunctival injection. PERRL.   ENT: No nasal discharge; airway clear.  NECK: Supple; non tender.  CARD: S1, S2 normal; no murmurs, gallops, or rubs. Regular rate and rhythm.   RESP: No wheezes, rales or rhonchi.  ABD: soft ntnd  RECTAL: No blood on finger  EXT: Normal ROM.  No clubbing, cyanosis or edema.   LYMPH: No acute cervical adenopathy.  NEURO: Alert, oriented, grossly unremarkable  PSYCH: Cooperative, appropriate.

## 2021-05-20 NOTE — ED PROVIDER NOTE - ATTENDING CONTRIBUTION TO CARE
41 y/o m w/ pmhx of opioid abuse 41 y/o m w/ pmhx of opioid abuse, reports not on methadone, ballesteros snot recently use, ? bleeding ulcer, follows with Dr. Shah at [facility], present for anemia, states was feeling generally weak went to GI office yesterday got general blood work, and called today and was told hgb 5. no previous hx of blood transfusions, pt reports was told he was borderline for needing one in the past. (hgb 9.1 in septemeber 2020, denies etoh abuse. No fever, chills, n/v, cp,  pleuritic cp, sob, palpitations, diaphoresis, cough, ha/lh/dizziness, numbness/tingling, neck pain/ stiffness, abd pain, diarrhea, constipation, melena/brbpr, urinary symptoms, trauma, edema, calf pain/swelling/erythema, sick contacts, recent travel or rash.    Vital Signs: I have reviewed the initial vital signs. Constitutional: Male pt sitting on stretcher speaking full sentences. Integumentary: No rash. +) Pallor. EYES; (+) Pale conjunctivae. ENT: MMM NECK: Supple, non-tender, no meningeal signs. Cardiovascular: RRR, radial pulses 2/4 b/l. No JVD. Respiratory: BS present b/l, ctabl, no wheezing or crackles, no accessory muscle use, no stridor. Gastrointestinal: BS present throughout all 4 quadrants, soft, nd, nt, no rebound tenderness or guarding, no cvat. Rectal as done by Resident Dr. Sanchez: No melena or brbpr. Musculoskeletal: FROM, no edema, no calf pain/swelling/erythema. No tremors. Neurologic: AAOx3, motor 5/5 and sensation intact throughout upper and lower ext, CN II-XII intact, No facial droop or slurring of speech. No focal deficits.

## 2021-05-20 NOTE — H&P ADULT - NSHPPHYSICALEXAM_GEN_ALL_CORE
VITAL SIGNS: AFebrile, vital signs stable  CONSTITUTIONAL: Well-developed; well-nourished; in no acute distress.  SKIN: Skin exam is warm and dry, no acute rash.  HEAD: Normocephalic; atraumatic.  EYES: Pupils equal round reactive to light, Extraocular movements intact; conjunctiva and sclera clear.  ENT: No nasal discharge; airway clear. Moist mucus membranes.  NECK: Supple; non tender. No rigidity  CARD: Regular rate and rhythm. Normal S1, S2; no murmurs, gallops, or rubs.  RESP: Lungs clear to auscultation bilaterally. No wheezes, rales or rhonchi.  ABD: Abdomen soft; non-tender; non-distended;  no hepatosplenomegaly.    EXT: Normal ROM. No clubbing, cyanosis or edema. No calf tenderness to palpation. being transfused   NEURO: Alert and oriented x 3. No focal deficits.  PSYCH: Cooperative, appropriate.

## 2021-05-20 NOTE — H&P ADULT - ATTENDING COMMENTS
40 M PMH opoid use, peptic ulcer disease presented with fatigue- found to have Hg around 5.   He reports having his last endoscopy 1 month ago which found bleeding ulcers with a normal colonoscopy.    Pt seen and examined- appears anxious- which he ascribes to not taking opioids    Spoke with RN- no other events overnight    Chart reviewed- agree with above    GI eval for EGD    keep active type and screen    2 large bore IV's    IVF    serial CBC    IV PPI    check drug screen; monitor for withdrawal and call detox    fall precautions    DVT proph

## 2021-05-20 NOTE — ED ADULT TRIAGE NOTE - CHIEF COMPLAINT QUOTE
pt came to ED because his hemoglobin level came back low, as per his GI MD. pt has "stomach issues" and states he has been "looking pale", reports increased fatigue

## 2021-05-20 NOTE — H&P ADULT - ASSESSMENT
40 M PMH opoid use, peptic ulcer disease presenting for anemia. Patient was feeling weak, tired, and getting easily fatigued. Outpatient blood work demonstrated hemoglobin 5 and patient was referred to emergency department. He reports having his last endoscopy/colonoscopy 1 month ago with Dr. Shah, at time said to have found bleeding ulcers with a normal colonoscopy. In ED, VSS. Labs show hemoglobin 5.6 with prelim iron studies being consistent with SHEELA.  ADAM (-). Patient give IV protonix push and ordered for 3u pRBC.     IMPRESSION  Acute on Chronic Microcytic Anemia  Iron Deficeiny Anemia  Suspected GIB  History Peptic Ulcer Disease    PLAN  -NPO   -repeat CBC after 3u pRBC  -BUN wnl, HD stable, defer protonix gtt, can give IV BID Protonix 40  -GI team eval  -attempt to obtain records from Dr. Shah (op GI at [facility])  -can give gentle hydration  -check LDH/haptoglobin (r/o hemolytic causes anemia, although appears less likely)  -may need to be d/c on iron supplementation f/u rest of SHEELA w/u  -CT angio abdomen and STAT GI eval if worsening bleeding/HD instability  -smoking cessation advised   -basophilia noted on initial labs, although rest of w/u consistent with GIB. if GI w/u negative can c/s hematology. can check TSH    __________________  DIET-NPO, LR @ 75cc/hr  GI ppx- protonix IV BID  AAT  dispo-acute, pending tfxn, pending GI eval      40 M PMH opoid use, peptic ulcer disease presenting for anemia. Patient was feeling weak, tired, and getting easily fatigued. Outpatient blood work demonstrated hemoglobin 5 and patient was referred to emergency department. He reports having his last endoscopy/colonoscopy 1 month ago with Dr. Shah, at time said to have found bleeding ulcers with a normal colonoscopy. In ED, VSS. Labs show hemoglobin 5.6 with prelim iron studies being consistent with SHEELA.  ADAM (-). Patient give IV protonix push and ordered for 3u pRBC.     IMPRESSION  Acute on Chronic Microcytic Anemia  Iron Deficeiny Anemia  Suspected GIB  History Peptic Ulcer Disease    PLAN  -NPO   -repeat CBC after 3u pRBC  -BUN wnl, HD stable, defer protonix gtt, can give IV BID Protonix 40  -GI team eval  -attempt to obtain records from Dr. Shah (op GI at [facility])  -can give gentle hydration  -check LDH/haptoglobin (r/o hemolytic causes anemia, although appears less likely)  -may need to be d/c on iron supplementation f/u rest of SHEELA w/u  -CT angio abdomen and STAT GI eval if worsening bleeding/HD instability  -smoking cessation advised   -basophilia noted on initial labs, although rest of w/u consistent with GIB. if GI w/u negative can c/s hematology. can check TSH    __________________  DIET-NPO, LR @ 75cc/hr  GI ppx- protonix IV BID  AAT  hold chemical dvt ppx for now  dispo-acute, pending tfxn, pending GI eval      40 M PMH opoid use, peptic ulcer disease presenting for anemia. Patient was feeling weak, tired, and getting easily fatigued. Outpatient blood work demonstrated hemoglobin 5 and patient was referred to emergency department. He reports having his last endoscopy/colonoscopy 1 month ago with Dr. Shah, at time said to have found bleeding ulcers with a normal colonoscopy. In ED, VSS. Labs show hemoglobin 5.6 with prelim iron studies being consistent with SHEELA.  ADAM (-). Patient give IV protonix push and ordered for 3u pRBC.     IMPRESSION  Acute on Chronic Microcytic Anemia  Iron Deficeiny Anemia  Suspected GIB  History Peptic Ulcer Disease    PLAN  -NPO   -repeat CBC after 3u pRBC  -BUN wnl, HD stable, no tachycardia- would defer protonix gtt, can give IV BID Protonix 40  -GI team eval  -attempt to obtain records from Dr. Shah (op GI at [facility])  -can give gentle hydration  -check LDH/haptoglobin (r/o hemolytic causes anemia, although appears less likely)  -may need to be d/c on iron supplementation f/u rest of SHEELA w/u  -CT angio abdomen and STAT GI eval if worsening bleeding/HD instability  -smoking cessation advised   -basophilia noted on initial labs, although rest of w/u consistent with GIB. if GI w/u negative can c/s hematology. can check TSH    __________________  DIET-NPO, LR @ 75cc/hr  GI ppx- protonix IV BID  AAT  hold chemical dvt ppx for now  dispo-acute, pending tfxn, pending GI eval

## 2021-05-21 ENCOUNTER — RESULT REVIEW (OUTPATIENT)
Age: 41
End: 2021-05-21

## 2021-05-21 ENCOUNTER — TRANSCRIPTION ENCOUNTER (OUTPATIENT)
Age: 41
End: 2021-05-21

## 2021-05-21 LAB
ALBUMIN SERPL ELPH-MCNC: 4.2 G/DL — SIGNIFICANT CHANGE UP (ref 3.5–5.2)
ALP SERPL-CCNC: 69 U/L — SIGNIFICANT CHANGE UP (ref 30–115)
ALT FLD-CCNC: 12 U/L — SIGNIFICANT CHANGE UP (ref 0–41)
ANION GAP SERPL CALC-SCNC: 9 MMOL/L — SIGNIFICANT CHANGE UP (ref 7–14)
AST SERPL-CCNC: 14 U/L — SIGNIFICANT CHANGE UP (ref 0–41)
BASOPHILS # BLD AUTO: 0.08 K/UL — SIGNIFICANT CHANGE UP (ref 0–0.2)
BASOPHILS NFR BLD AUTO: 1.1 % — HIGH (ref 0–1)
BILIRUB SERPL-MCNC: 0.5 MG/DL — SIGNIFICANT CHANGE UP (ref 0.2–1.2)
BUN SERPL-MCNC: 9 MG/DL — LOW (ref 10–20)
CALCIUM SERPL-MCNC: 8.9 MG/DL — SIGNIFICANT CHANGE UP (ref 8.5–10.1)
CHLORIDE SERPL-SCNC: 106 MMOL/L — SIGNIFICANT CHANGE UP (ref 98–110)
CO2 SERPL-SCNC: 24 MMOL/L — SIGNIFICANT CHANGE UP (ref 17–32)
COVID-19 SPIKE DOMAIN AB INTERP: POSITIVE
COVID-19 SPIKE DOMAIN ANTIBODY RESULT: >250 U/ML — HIGH
CREAT SERPL-MCNC: 0.8 MG/DL — SIGNIFICANT CHANGE UP (ref 0.7–1.5)
EOSINOPHIL # BLD AUTO: 0.31 K/UL — SIGNIFICANT CHANGE UP (ref 0–0.7)
EOSINOPHIL NFR BLD AUTO: 4.2 % — SIGNIFICANT CHANGE UP (ref 0–8)
FERRITIN SERPL-MCNC: 6 NG/ML — LOW (ref 30–400)
GLUCOSE SERPL-MCNC: 90 MG/DL — SIGNIFICANT CHANGE UP (ref 70–99)
HAPTOGLOB SERPL-MCNC: 236 MG/DL — HIGH (ref 34–200)
HCT VFR BLD CALC: 31.2 % — LOW (ref 42–52)
HGB BLD-MCNC: 8.8 G/DL — LOW (ref 14–18)
IMM GRANULOCYTES NFR BLD AUTO: 0.4 % — HIGH (ref 0.1–0.3)
LDH SERPL L TO P-CCNC: 182 U/L — SIGNIFICANT CHANGE UP (ref 50–242)
LYMPHOCYTES # BLD AUTO: 1.67 K/UL — SIGNIFICANT CHANGE UP (ref 1.2–3.4)
LYMPHOCYTES # BLD AUTO: 22.7 % — SIGNIFICANT CHANGE UP (ref 20.5–51.1)
MCHC RBC-ENTMCNC: 19.5 PG — LOW (ref 27–31)
MCHC RBC-ENTMCNC: 28.2 G/DL — LOW (ref 32–37)
MCV RBC AUTO: 69 FL — LOW (ref 80–94)
MONOCYTES # BLD AUTO: 0.65 K/UL — HIGH (ref 0.1–0.6)
MONOCYTES NFR BLD AUTO: 8.8 % — SIGNIFICANT CHANGE UP (ref 1.7–9.3)
NEUTROPHILS # BLD AUTO: 4.62 K/UL — SIGNIFICANT CHANGE UP (ref 1.4–6.5)
NEUTROPHILS NFR BLD AUTO: 62.8 % — SIGNIFICANT CHANGE UP (ref 42.2–75.2)
NRBC # BLD: 0 /100 WBCS — SIGNIFICANT CHANGE UP (ref 0–0)
PLATELET # BLD AUTO: 322 K/UL — SIGNIFICANT CHANGE UP (ref 130–400)
POTASSIUM SERPL-MCNC: 4.5 MMOL/L — SIGNIFICANT CHANGE UP (ref 3.5–5)
POTASSIUM SERPL-SCNC: 4.5 MMOL/L — SIGNIFICANT CHANGE UP (ref 3.5–5)
PROT SERPL-MCNC: 6.3 G/DL — SIGNIFICANT CHANGE UP (ref 6–8)
RBC # BLD: 4.52 M/UL — LOW (ref 4.7–6.1)
RBC # BLD: 4.52 M/UL — LOW (ref 4.7–6.1)
RBC # FLD: 25.6 % — HIGH (ref 11.5–14.5)
RETICS #: 51.4 K/UL — SIGNIFICANT CHANGE UP (ref 25–125)
RETICS/RBC NFR: 1.2 % — SIGNIFICANT CHANGE UP (ref 0.5–1.5)
SARS-COV-2 IGG+IGM SERPL QL IA: >250 U/ML — HIGH
SARS-COV-2 IGG+IGM SERPL QL IA: POSITIVE
SODIUM SERPL-SCNC: 139 MMOL/L — SIGNIFICANT CHANGE UP (ref 135–146)
TSH SERPL-MCNC: 0.97 UIU/ML — SIGNIFICANT CHANGE UP (ref 0.27–4.2)
WBC # BLD: 7.36 K/UL — SIGNIFICANT CHANGE UP (ref 4.8–10.8)
WBC # FLD AUTO: 7.36 K/UL — SIGNIFICANT CHANGE UP (ref 4.8–10.8)

## 2021-05-21 PROCEDURE — 99223 1ST HOSP IP/OBS HIGH 75: CPT | Mod: 25

## 2021-05-21 PROCEDURE — 43239 EGD BIOPSY SINGLE/MULTIPLE: CPT

## 2021-05-21 PROCEDURE — 88305 TISSUE EXAM BY PATHOLOGIST: CPT | Mod: 26

## 2021-05-21 PROCEDURE — 99221 1ST HOSP IP/OBS SF/LOW 40: CPT | Mod: GC

## 2021-05-21 PROCEDURE — 88312 SPECIAL STAINS GROUP 1: CPT | Mod: 26

## 2021-05-21 RX ORDER — METHADONE HYDROCHLORIDE 40 MG/1
10 TABLET ORAL ONCE
Refills: 0 | Status: DISCONTINUED | OUTPATIENT
Start: 2021-05-21 | End: 2021-05-21

## 2021-05-21 RX ORDER — METHADONE HYDROCHLORIDE 40 MG/1
10 TABLET ORAL EVERY 12 HOURS
Refills: 0 | Status: DISCONTINUED | OUTPATIENT
Start: 2021-05-21 | End: 2021-05-21

## 2021-05-21 RX ORDER — CHLORHEXIDINE GLUCONATE 213 G/1000ML
1 SOLUTION TOPICAL
Refills: 0 | Status: DISCONTINUED | OUTPATIENT
Start: 2021-05-21 | End: 2021-05-24

## 2021-05-21 RX ORDER — METHADONE HYDROCHLORIDE 40 MG/1
5 TABLET ORAL EVERY 12 HOURS
Refills: 0 | Status: DISCONTINUED | OUTPATIENT
Start: 2021-05-21 | End: 2021-05-23

## 2021-05-21 RX ORDER — METHADONE HYDROCHLORIDE 40 MG/1
10 TABLET ORAL
Refills: 0 | Status: COMPLETED | OUTPATIENT
Start: 2021-05-21 | End: 2021-05-23

## 2021-05-21 RX ORDER — OXYCODONE HYDROCHLORIDE 5 MG/1
5 TABLET ORAL ONCE
Refills: 0 | Status: DISCONTINUED | OUTPATIENT
Start: 2021-05-21 | End: 2021-05-21

## 2021-05-21 RX ADMIN — METHADONE HYDROCHLORIDE 5 MILLIGRAM(S): 40 TABLET ORAL at 21:38

## 2021-05-21 RX ADMIN — Medication 5 MILLIGRAM(S): at 00:05

## 2021-05-21 RX ADMIN — Medication 5 MILLIGRAM(S): at 21:38

## 2021-05-21 RX ADMIN — OXYCODONE HYDROCHLORIDE 5 MILLIGRAM(S): 5 TABLET ORAL at 08:04

## 2021-05-21 RX ADMIN — METHADONE HYDROCHLORIDE 10 MILLIGRAM(S): 40 TABLET ORAL at 14:37

## 2021-05-21 NOTE — CONSULT NOTE ADULT - ASSESSMENT
40 M PMH opoid use, peptic ulcer disease (last 15 years, found to have bleeding ulcer with Dr. Shah [facility] GI on endoscopy 1 month ago) presenting for anemia. Patient was feeling weak, tired, and getting easily fatigued. He states his friends told him he was looking pale. Went in for routine blood work, demonstrated hemoglobin 5 and patient was referred to emergency department. Patient does have previous history of melena and "throwing up blood" but denies needing previous transfusion. Patient was incarcerated at [facility] and released approx 8 months ago. He reports having his last endoscopy/colonoscopy 1 month ago with Dr. Shah, at time said to have found bleeding ulcers with a normal colonoscopy.      Denies EtOH abuse, fever, chill, chest pain, cough, abdominal pain, diarrhea, constipation, melena, hematochezia, hematemesis, hematuria.   In ED, VSS. Labs show hemoglobin 5.6 with prelim iron studies being consistent with SHEELA.  ADAM (-). Patient give IV protonix push and ordered for 3u pRBC.       # Symptomatic anemia:  - hgb 5.6 on admission ( baseline 9.1)--> 3 UPRBC--> 8.3   - responding appropriately to transfusion  - VS stable  - NO NSAIDS use  - ADAM showed brown stool  - patient also endorses got treatment for H. Pylori recently  -as per patient, 1 month ago with Dr. Shah, at time said to have found bleeding stomach ulcer with a normal colonoscopy.       Rec:  - PPI BID  - No signs of active GI bleed  - Monitor CBC  - will discuss with attending about EGD today ( Keep NPO)  - will need follow up with Dr. Shah as outpatient for H. Pylori eradication test and possible VCE based on findings

## 2021-05-21 NOTE — PROGRESS NOTE ADULT - SUBJECTIVE AND OBJECTIVE BOX
Subjective:    HPI:  PAVAN SOUZA is a 40y old white Male, with history of opioid use disorder, benzodiazepine use disorder, PMH of gastric ulcers, anemia who was admitted for anemia. Addiction medicine consulted for patient's opioid use, with patient requesting detox.  Patient seen and examined at bedside, calm and cooperative with interview. He reports that he was hoping to detox but did not know that inpatient detox unit had closed. Reports that he habitually uses opiates and benzos. Reports he takes 10 pills of 30mg oxycodone per day, as well as snorting 20 bags of heroin per day, consistently for past several months, with last use about 2 days ago. Reports he also takes 3 or 4 2mg sticks of xanax per day, with last use 3 days ago.  Reports that he is interested in a methadone taper for detox, is not interested in being induced on methadone or suboxone at this time. Reports he was previously in methadone maintenance program and was on 60mg of methadone per day, but does not want to have be "addicted to methadone" or have to go in to a program because he states he would.       Substance Use History:      Past Psychiatric History:      Family History:      Social History:      Objective:    Vitals:  Vital Signs Last 24 Hrs  T(C): 37 (21 May 2021 11:35), Max: 37.3 (21 May 2021 00:20)  T(F): 98.6 (21 May 2021 11:35), Max: 99.2 (21 May 2021 00:20)  HR: 75 (21 May 2021 11:35) (59 - 76)  BP: 112/62 (21 May 2021 11:35) (87/51 - 130/83)  BP(mean): --  RR: 22 (21 May 2021 11:35) (16 - 30)  SpO2: 98% (21 May 2021 11:35) (96% - 100%)    Labs:                        8.8    7.36  )-----------( 322      ( 21 May 2021 07:13 )             31.2     05-21    139  |  106  |  9<L>  ----------------------------<  90  4.5   |  24  |  0.8    Ca    8.9      21 May 2021 07:13    TPro  6.3  /  Alb  4.2  /  TBili  0.5  /  DBili  x   /  AST  14  /  ALT  12  /  AlkPhos  69  05-21            Mental Status Exam:   Appearance: well-appearing, appears stated age, good hygiene and grooming  Behavior: calm, cooperative, good eye contact  Speech: regular volume, rate, and prosody  Reported Mood: ""  Affect:   Thought process: linear, goal-directed  Thought Content: no suicidal ideation, no homicidal ideation, no prominent delusions  Perceptions: no auditory or visual hallucinations  Memory: good recent and remote memory  Orientation: alert and oriented to person, place, time, and situation  Insight: good  Judgement: good        Assessment:        Recommendations:               Subjective:    HPI:  PAVAN SOUZA is a 40y old white Male, with history of opioid use disorder, benzodiazepine use disorder, PMH of gastric ulcers, anemia who was admitted for anemia. Addiction medicine consulted for patient's opioid use, with patient requesting detox.  Patient seen and examined at bedside, calm and cooperative with interview. He reports that he was hoping to detox but did not know that inpatient detox unit had closed. Reports that he habitually uses opiates and benzos. Reports he takes 10 pills of 30mg oxycodone per day, as well as snorting 20 bags of heroin per day, consistently for past several months, with last use about 2 days ago. Reports he also takes 3 or 4 2mg sticks of xanax per day, with last use 3 days ago.  Reports that he is interested in a methadone taper for detox, is not interested in being induced on methadone or suboxone at this time. Reports he was previously in methadone maintenance program and was on 60mg of methadone per day, but does not want to have be "addicted to methadone" or have to go in to a program because he states he would not be able to maintain a job and attend a methadone program at the same time.  Reports he was incarcerated for 3 years, came back in September. Reports he was mostly sober while in [facility] but started using again after coming out, however at this time wants to stop using substances because he does not want to end up back in [facility]. Reports he unintentionally overdosed on heroin when he came out of [facility] when using "just a couple of bags". Denies any suicide attempts, suicidal or homicidal ideation. Denies any auditory or visual hallucinations.      Substance Use History:  Opioids: Reports he takes 10 pills of 30mg oxycodone per day, as well as snorting 20 bags of heroin per day, consistently for past several months, with last use about 2 days ago.   Benzos: Reports he also takes 3 or 4 2mg sticks of xanax per day, with last use 3 days ago.  Alcohol: reports last drink was 1 week ago, will have roughly 4 drinks/week  Other: Reports prior use of cocaine and cannabis many years ago but denies any recent use, denies any nicotine use      Past Psychiatric History:  denies        Objective:    Vitals:  Vital Signs Last 24 Hrs  T(C): 37 (21 May 2021 11:35), Max: 37.3 (21 May 2021 00:20)  T(F): 98.6 (21 May 2021 11:35), Max: 99.2 (21 May 2021 00:20)  HR: 75 (21 May 2021 11:35) (59 - 76)  BP: 112/62 (21 May 2021 11:35) (87/51 - 130/83)  BP(mean): --  RR: 22 (21 May 2021 11:35) (16 - 30)  SpO2: 98% (21 May 2021 11:35) (96% - 100%)    Labs:                        8.8    7.36  )-----------( 322      ( 21 May 2021 07:13 )             31.2     05-21    139  |  106  |  9<L>  ----------------------------<  90  4.5   |  24  |  0.8    Ca    8.9      21 May 2021 07:13    TPro  6.3  /  Alb  4.2  /  TBili  0.5  /  DBili  x   /  AST  14  /  ALT  12  /  AlkPhos  69  05-21            Mental Status Exam:   Appearance: well-appearing, appears stated age, good hygiene and grooming  Behavior: calm, cooperative, good eye contact  Speech: regular volume, rate, and prosody  Reported Mood: "ok"  Affect: anxious  Thought process: linear, goal-directed  Thought Content: no suicidal ideation, no homicidal ideation, no prominent delusions  Perceptions: no auditory or visual hallucinations  Memory: good recent and remote memory  Orientation: alert and oriented to person, place, time, and situation  Insight: good  Judgement: fair      COWS score 6 at time of interview, indicative of mild withdrawal, though per chart patient received oxycodone 5mg at 8AM today, a few hours prior to interview.  CIWA score 3 at time of interview, indicative of absent or minimal withdrawal.

## 2021-05-21 NOTE — PROGRESS NOTE ADULT - ATTENDING COMMENTS
Mr Malin is a 40 year old man with a history of Benzodiazepine and Opioid use disorder who was admitted to the medical floor for the management of anaemia.   Addiction consult was called for the management of Benzodiazepine and Opioid Use disorders. Patient appears to be dependent on Benzodiazepines and opioids and seems to be interested in achieving and maintaining sobriety. Patient seems to have mild symptoms of opioid withdrawals given the COWS score of 6, however did not appear to have any symptoms suggestive of Benzodiazepine withdrawals. Patient denies current symptoms of psychosis , adis or depression. he denies current suicidal ideations, intent or plan.   At this time, patient is not considered an imminent danger to himself or others and does not need inpatient psychiatric hospitalization. Patient does not appear have symptoms suggestive of Benzodiazepine withdrawals however will continue to benefit from CIWA monitoring . He request detox from Opioid with methadone after which he wants to follow up with an outpatient substance use disorder program on out patient basis . Patient was psychoeducated about the benefit of inducing him on wither Suboxone or methadone as medication assisted treatment of Opioid use disorder as opposed to being detoxed especially given the risk of overdose from decreased tolerance to the drugs following a detox. patient verbalized understanding and was agreeable that this was a concerning risk, however continued to insist that he no longer wants to be on Opioids and would prefer to be detoxed. We recommend the normal Opioid Withdrawal protocol for now. Patient will be given seen by the [facility]ATCH Team  and counsellors who will also provide him with the Narcan Kit.

## 2021-05-21 NOTE — CHART NOTE - NSCHARTNOTEFT_GEN_A_CORE
PACU ANESTHESIA ADMISSION NOTE      Procedure:   Post op diagnosis:      ____  Intubated  TV:______       Rate: ______      FiO2: ______    _x___  Patent Airway    _x___  Full return of protective reflexes    ___  Full recovery from anesthesia / back to baseline status    Vitals:  T(C): 37.2 (05-21-21 @ 10:34), Max: 37.3 (05-21-21 @ 00:20)  HR: 59 (05-21-21 @ 10:34) (59 - 88)  BP: 130/83 (05-21-21 @ 10:34) (113/71 - 134/62)  RR: 16 (05-21-21 @ 10:34) (16 - 20)  SpO2: 99% (05-20-21 @ 19:49) (99% - 100%)    Mental Status:  _x___ Awake   _____ Alert   _____ Drowsy   _____ Sedated    Nausea/Vomiting:  _x___  NO       ______Yes,   See Post - Op Orders         Pain Scale (0-10):  __0___    Treatment: _x___ None    ____ See Post - Op/PCA Orders    Post - Operative Fluids:   __x__ Oral   ____ See Post - Op Orders    Plan: Discharge:   _x___Home       _____Floor     _____Critical Care    _____  Other:_________________    Comments:  No anesthesia issues or complications noted.  Discharge when criteria met.

## 2021-05-21 NOTE — SBIRT NOTE ADULT - NSSBIRTUNABLESCROTHER_GEN_A_CORE
SW met with patient at the bedside. Patient already seen by CATCH team. Patient verbalized he wants an inpatient rehab referral. Andrew from CATCH made aware.

## 2021-05-21 NOTE — CONSULT NOTE ADULT - SUBJECTIVE AND OBJECTIVE BOX
Mindi Agudelo is a 63 y.o. female patient of Ramona Manriquez MD who presents to the clinic today for   Chief Complaint   Patient presents with    Nausea    Diarrhea     going on 4 days- took immodium 2 days ago   .    HPI    Pt, who is not known to me, reports a new problem to me:  Fatigue mavis before a BM, diarrhea 4-6x per day.  After 3 days she took 1/2 immodium tablet.  Feels scared that she's having fatigue prior to a BM.  Daily has fatigue around 5-6 p.m.  Eats and she feels somewhat better.  Has sleep apneas, on treatment.   Still awakens during the night.  Recently saw Dr. Baron.  Still has some fatigue.    These symptoms began 4 days  ago and status is continuing nausea but stools now soft and no longer having diarrhea..     Pt denies the following symptoms:  Vomiting, diarrhea for 2 days, fever    Aggravating factors include eating .    Relieving factors include immodium .    OTC Medications tried are immodium.    Prescription medications taken for symptoms are none.    Pertinent history:  H/o sleep apnea.  Denies h/o diarrhea and nausea.    ROS    Constitutional: No fever, + fatigue, decrease in appetite because eating makes her feel worse.    GI:  No stomach ache, + nausea, no vomiting, + diarrhea, no constipation, + heartburn--reduced with the pantoprazole..    Urinary:  No dysuria, no frequency, no urgency, no flank pain.     HEENT/Heart/Lung/MS/Skin:  No symptoms or no changes.      PAST MEDICAL HISTORY:  Past Medical History:   Diagnosis Date    GERD (gastroesophageal reflux disease)     Hemorrhoid     Hypertension     Mild vitamin D deficiency     Osteopenia        PAST SURGICAL HISTORY:  Past Surgical History:   Procedure Laterality Date    COLONOSCOPY  6/15    no polyps, Dr. Marianna Sanchez    TONSILLECTOMY         SOCIAL HISTORY:  Social History     Social History    Marital status:      Spouse name: N/A    Number of children: 2    Years of education: N/A     Occupational  History    retired [employer]     Social History Main Topics    Smoking status: Never Smoker    Smokeless tobacco: Never Used    Alcohol use No    Drug use: No    Sexual activity: No     Other Topics Concern    Not on file     Social History Narrative    No narrative on file       FAMILY HISTORY:  Family History   Problem Relation Age of Onset    Cataracts Father     Heart disease Father      CHF    COPD Mother     COPD Sister      smoker    Diabetes Maternal Grandmother     Diabetes Maternal Grandfather     Diabetes Paternal Grandmother     Diabetes Paternal Grandfather        ALLERGIES AND MEDICATIONS: updated and reviewed.  Review of patient's allergies indicates:  No Known Allergies  Current Outpatient Prescriptions   Medication Sig Dispense Refill    b complex vitamins tablet Take 1 tablet by mouth once daily.      CALCIUM CARBONATE/VITAMIN D3 (VITAMIN D-3 ORAL) Take 1 tablet by mouth once daily.       losartan (COZAAR) 25 MG tablet Take 1 tablet (25 mg total) by mouth once daily. 90 tablet 3    multivitamin (ONE DAILY MULTIVITAMIN) per tablet Take 1 tablet by mouth once daily.      ondansetron (ZOFRAN-ODT) 8 MG TbDL Take 8 mg by mouth every 6 (six) hours as needed.   0    pantoprazole (PROTONIX) 40 MG tablet TAKE 1 TABLET BY MOUTH EVERY OTHER DAY 30 tablet 5     No current facility-administered medications for this visit.          PHYSICAL EXAM    Alert, coop 63 y.o. female patient in no acute distress, is not ill-appearing.    Vitals:    10/13/17 1432   BP: 132/84   Pulse: 71   Temp: 98.5 °F (36.9 °C)     VS reviewed.  VS stable.  CC, nursing note, medications & PMH all reviewed today.    Head:  Normocephalic, atraumatic.    EENT:  Ext nose/ears normal.               Eye lids normal, no discharge present.                       Resp:  Respirations even, unlabored              Lungs CTA bilat.    Heart:  Heart rate normal.  RRR, no MRG on ausculation.    ABD:  Soft,  round, NT to palp.  Normal BS in all 4 quadrants.  No rebound or organomegaly.              No peritoneal signs.  No CVAT    MS:  Ambulates normally.      NEURO:  Alert and oriented x 4.  Responds appropriately during interaction.    Skin:  Warm, dry, color good..    Psych:  Responds appropriately throughout the visit.               Relaxed.  Well-groomed.    Nausea  -     ondansetron (ZOFRAN-ODT) 8 MG TbDL; Take 1 tablet (8 mg total) by mouth every 8 (eight) hours as needed.  Dispense: 12 tablet; Refill: 0  -     Comprehensive metabolic panel; Future; Expected date: 10/13/2017  -     CBC auto differential; Future; Expected date: 10/13/2017  -     Urinalysis    Diarrhea, unspecified type  -     Comprehensive metabolic panel; Future; Expected date: 10/13/2017  -     CBC auto differential; Future; Expected date: 10/13/2017  -     Urinalysis      Pt today presents with 4 days of abd complaint that started with diarrhea 4-6x daily, no blood or mucus, no vomiting.  States she gets a wave of fatigue before her BMs.  Having a lot of abd gas.  Nausea as well.  Has had nausea in the past (2015, stomach was upset after her father's death, went to [facility], work up neg, got zofran)    This is a new problem to me and the following work up is planned.    Lab & Radiological Tests Ordered: CMP, CBC, urinalysis.  The results are pending.      Pt advised to perform comfort measures recommended on patient instruction sheet .    Explained exam findings, diagnosis and treatment plan to patient.  Questions answered and patient states understanding.     Gastroenterology Consultation:    Patient is a 40y old  Male who presents with a chief complaint of     Admitted on: 05-20-21  HPI:  40 M PMH opoid use, peptic ulcer disease (last 15 years, found to have bleeding ulcer with Dr. Shah [facility] GI on endoscopy 1 month ago) presenting for anemia. Patient was feeling weak, tired, and getting easily fatigued. He states his friends told him he was looking pale. Went in for routine blood work, demonstrated hemoglobin 5 and patient was referred to emergency department. Patient does have previous history of melena and "throwing up blood" but denies needing previous transfusion. Patient was incarcerated at [facility] and released approx 8 months ago. He reports having his last endoscopy/colonoscopy 1 month ago with Dr. Shah, at time said to have found bleeding ulcers with a normal colonoscopy.      Denies EtOH abuse, fever, chill, chest pain, cough, abdominal pain, diarrhea, constipation, melena, hematochezia, hematemesis, hematuria.   In ED, VSS. Labs show hemoglobin 5.6 with prelim iron studies being consistent with SHEELA.  ADAM (-). Patient give IV protonix push and ordered for 3u pRBC.         Prior records Reviewed (Y/N): Y  History obtained from person other than patient (Y/N): N    Prior EGD and Prior Colonoscopy: as per patient, 1 month ago with Dr. Shah, at time said to have found bleeding stomach ulcer with a normal colonoscopy.       PAST MEDICAL & SURGICAL HISTORY:  Peptic ulcer disease    No significant past surgical history        FAMILY HISTORY:  Non-contributory    Social History:  Tobacco: N  Alcohol: N  Drugs: h/o opioid use    Home Medications:    MEDICATIONS  (STANDING):  chlorhexidine 4% Liquid 1 Application(s) Topical <User Schedule>  lactated ringers. 1000 milliLiter(s) (75 mL/Hr) IV Continuous <Continuous>  melatonin 5 milliGRAM(s) Oral at bedtime  pantoprazole  Injectable 40 milliGRAM(s) IV Push two times a day    MEDICATIONS  (PRN):      Allergies  No Known Allergies      Review of Systems:   Constitutional:  No Fever, No Chills  ENT/Mouth:  No Hearing Changes,  No Difficulty Swallowing  Eyes:  No Eye Pain, No Vision Changes  Cardiovascular:  No Chest Pain, No Palpitations  Respiratory:  No Cough, No Dyspnea  Gastrointestinal:  As described in HPI  Musculoskeletal:  No Joint Swelling, No Back Pain  Skin:  No Skin Lesions, No Jaundice  Neuro:  No Syncope, No Dizziness  Heme/Lymph:  No Bruising, No Bleeding.          Physical Examination:  T(C): 37.2 (05-21-21 @ 08:06), Max: 37.3 (05-21-21 @ 00:20)  HR: 71 (05-21-21 @ 00:20) (61 - 88)  BP: 117/81 (05-21-21 @ 08:06) (113/71 - 134/62)  RR: 20 (05-21-21 @ 08:06) (16 - 20)  SpO2: 99% (05-20-21 @ 19:49) (99% - 100%)  Height (cm): 182.9 (05-20-21 @ 12:30)  Weight (kg): 104.3 (05-20-21 @ 12:30)      Constitutional: No acute distress.  Eyes:. Conjunctivae are clear, Sclera is non-icteric.  Ears Nose and Throat: The external ears are normal appearing,  Oral mucosa is pink and moist.  Respiratory:  No signs of respiratory distress. Lung sounds are clear bilaterally.  Cardiovascular:  S1 S2, Regular rate and rhythm.  GI: Abdomen is soft, symmetric, and non-tender without distention. There are no visible lesions. Bowel sounds are present and normoactive in all four quadrants. No masses, hepatomegaly, or splenomegaly are noted.  ADAM showed brown stool  Neuro: No Tremor, No involuntary movements  Skin: No rashes, No Jaundice.          Data: (reviewed by attending)                        8.3    8.25  )-----------( 324      ( 20 May 2021 20:34 )             29.2     Hgb Trend:  8.3  05-20-21 @ 20:34  5.6  05-20-21 @ 13:10      05-20    137  |  102  |  15  ----------------------------<  89  4.7   |  24  |  0.8    Ca    8.4<L>      20 May 2021 13:10    TPro  6.6  /  Alb  4.0  /  TBili  <0.2  /  DBili  x   /  AST  14  /  ALT  13  /  AlkPhos  64  05-20    Liver panel trend:  TBili <0.2   /   AST 14   /   ALT 13   /   AlkP 64   /   Tptn 6.6   /   Alb 4.0    /   DBili --      05-20      PT/INR - ( 20 May 2021 14:03 )   PT: 13.00 sec;   INR: 1.13 ratio         PTT - ( 20 May 2021 14:03 )  PTT:29.9 sec

## 2021-05-21 NOTE — PROGRESS NOTE ADULT - SUBJECTIVE AND OBJECTIVE BOX
24H events:    Patient is a 40y old Male who presents with a chief complaint of   Primary diagnosis of Anemia       Today is hospital day 1d. This morning patient was seen and examined at bedside, resting comfortably in bed.    No acute or major events overnight.    PAST MEDICAL & SURGICAL HISTORY  Peptic ulcer disease    No significant past surgical history      SOCIAL HISTORY:  Social History:  Patient was incarcerated at [facility] and released approx 8 months ago.  Smokes occasioally, takes oxycodone (illicit), denies EtOH use. (20 May 2021 17:21)      ALLERGIES:  No Known Allergies    MEDICATIONS:  STANDING MEDICATIONS  chlorhexidine 4% Liquid 1 Application(s) Topical <User Schedule>  lactated ringers. 1000 milliLiter(s) IV Continuous <Continuous>  melatonin 5 milliGRAM(s) Oral at bedtime  pantoprazole  Injectable 40 milliGRAM(s) IV Push two times a day    PRN MEDICATIONS    VITALS:   T(F): 98.9  HR: 71  BP: 117/81  RR: 20  SpO2: 99%    PHYSICAL EXAM:  GENERAL: NAD, well-groomed, well-developed  HEAD:  Atraumatic, Normocephalic  EYES: EOMI  NECK: Supple  NERVOUS SYSTEM:  Alert & Oriented X3, non focal   CHEST/LUNG: Clear to auscultation bilaterally; No rales, rhonchi, wheezing, or rubs  HEART: Regular rate and rhythm; No murmurs, rubs, or gallops  ABDOMEN: Soft, Nontender, Nondistended; Bowel sounds present  EXTREMITIES:  2+ Peripheral Pulses, No clubbing, cyanosis, or edema  LYMPH: No lymphadenopathy noted  SKIN: No rashes or lesions  LABS:                        8.8    7.36  )-----------( 322      ( 21 May 2021 07:13 )             31.2     05-20    137  |  102  |  15  ----------------------------<  89  4.7   |  24  |  0.8    Ca    8.4<L>      20 May 2021 13:10    TPro  6.6  /  Alb  4.0  /  TBili  <0.2  /  DBili  x   /  AST  14  /  ALT  13  /  AlkPhos  64  05-20    PT/INR - ( 20 May 2021 14:03 )   PT: 13.00 sec;   INR: 1.13 ratio         PTT - ( 20 May 2021 14:03 )  PTT:29.9 sec              RADIOLOGY:

## 2021-05-22 LAB
ALBUMIN SERPL ELPH-MCNC: 3.8 G/DL — SIGNIFICANT CHANGE UP (ref 3.5–5.2)
ALP SERPL-CCNC: 72 U/L — SIGNIFICANT CHANGE UP (ref 30–115)
ALT FLD-CCNC: 10 U/L — SIGNIFICANT CHANGE UP (ref 0–41)
ANION GAP SERPL CALC-SCNC: 10 MMOL/L — SIGNIFICANT CHANGE UP (ref 7–14)
AST SERPL-CCNC: 15 U/L — SIGNIFICANT CHANGE UP (ref 0–41)
BASOPHILS # BLD AUTO: 0.09 K/UL — SIGNIFICANT CHANGE UP (ref 0–0.2)
BASOPHILS NFR BLD AUTO: 1.2 % — HIGH (ref 0–1)
BILIRUB SERPL-MCNC: 0.3 MG/DL — SIGNIFICANT CHANGE UP (ref 0.2–1.2)
BUN SERPL-MCNC: 13 MG/DL — SIGNIFICANT CHANGE UP (ref 10–20)
CALCIUM SERPL-MCNC: 8.5 MG/DL — SIGNIFICANT CHANGE UP (ref 8.5–10.1)
CHLORIDE SERPL-SCNC: 106 MMOL/L — SIGNIFICANT CHANGE UP (ref 98–110)
CO2 SERPL-SCNC: 23 MMOL/L — SIGNIFICANT CHANGE UP (ref 17–32)
CREAT SERPL-MCNC: 0.9 MG/DL — SIGNIFICANT CHANGE UP (ref 0.7–1.5)
EOSINOPHIL # BLD AUTO: 0.39 K/UL — SIGNIFICANT CHANGE UP (ref 0–0.7)
EOSINOPHIL NFR BLD AUTO: 5 % — SIGNIFICANT CHANGE UP (ref 0–8)
GLUCOSE SERPL-MCNC: 91 MG/DL — SIGNIFICANT CHANGE UP (ref 70–99)
HCT VFR BLD CALC: 31.3 % — LOW (ref 42–52)
HGB BLD-MCNC: 9 G/DL — LOW (ref 14–18)
IMM GRANULOCYTES NFR BLD AUTO: 0.9 % — HIGH (ref 0.1–0.3)
LYMPHOCYTES # BLD AUTO: 1.86 K/UL — SIGNIFICANT CHANGE UP (ref 1.2–3.4)
LYMPHOCYTES # BLD AUTO: 23.9 % — SIGNIFICANT CHANGE UP (ref 20.5–51.1)
MCHC RBC-ENTMCNC: 19.6 PG — LOW (ref 27–31)
MCHC RBC-ENTMCNC: 28.8 G/DL — LOW (ref 32–37)
MCV RBC AUTO: 68.2 FL — LOW (ref 80–94)
MONOCYTES # BLD AUTO: 0.69 K/UL — HIGH (ref 0.1–0.6)
MONOCYTES NFR BLD AUTO: 8.9 % — SIGNIFICANT CHANGE UP (ref 1.7–9.3)
NEUTROPHILS # BLD AUTO: 4.69 K/UL — SIGNIFICANT CHANGE UP (ref 1.4–6.5)
NEUTROPHILS NFR BLD AUTO: 60.1 % — SIGNIFICANT CHANGE UP (ref 42.2–75.2)
NRBC # BLD: 0 /100 WBCS — SIGNIFICANT CHANGE UP (ref 0–0)
PLATELET # BLD AUTO: 268 K/UL — SIGNIFICANT CHANGE UP (ref 130–400)
POTASSIUM SERPL-MCNC: 4.5 MMOL/L — SIGNIFICANT CHANGE UP (ref 3.5–5)
POTASSIUM SERPL-SCNC: 4.5 MMOL/L — SIGNIFICANT CHANGE UP (ref 3.5–5)
PROT SERPL-MCNC: 6 G/DL — SIGNIFICANT CHANGE UP (ref 6–8)
RBC # BLD: 4.59 M/UL — LOW (ref 4.7–6.1)
RBC # FLD: 26.1 % — HIGH (ref 11.5–14.5)
SODIUM SERPL-SCNC: 139 MMOL/L — SIGNIFICANT CHANGE UP (ref 135–146)
WBC # BLD: 7.79 K/UL — SIGNIFICANT CHANGE UP (ref 4.8–10.8)
WBC # FLD AUTO: 7.79 K/UL — SIGNIFICANT CHANGE UP (ref 4.8–10.8)

## 2021-05-22 PROCEDURE — 99233 SBSQ HOSP IP/OBS HIGH 50: CPT

## 2021-05-22 RX ORDER — SUCRALFATE 1 G
1 TABLET ORAL EVERY 6 HOURS
Refills: 0 | Status: DISCONTINUED | OUTPATIENT
Start: 2021-05-22 | End: 2021-05-24

## 2021-05-22 RX ORDER — PANTOPRAZOLE SODIUM 20 MG/1
40 TABLET, DELAYED RELEASE ORAL EVERY 12 HOURS
Refills: 0 | Status: DISCONTINUED | OUTPATIENT
Start: 2021-05-22 | End: 2021-05-24

## 2021-05-22 RX ADMIN — METHADONE HYDROCHLORIDE 5 MILLIGRAM(S): 40 TABLET ORAL at 08:23

## 2021-05-22 RX ADMIN — Medication 1 GRAM(S): at 17:04

## 2021-05-22 RX ADMIN — Medication 5 MILLIGRAM(S): at 21:26

## 2021-05-22 RX ADMIN — CHLORHEXIDINE GLUCONATE 1 APPLICATION(S): 213 SOLUTION TOPICAL at 05:31

## 2021-05-22 RX ADMIN — PANTOPRAZOLE SODIUM 40 MILLIGRAM(S): 20 TABLET, DELAYED RELEASE ORAL at 17:04

## 2021-05-22 RX ADMIN — Medication 1 GRAM(S): at 23:01

## 2021-05-22 RX ADMIN — METHADONE HYDROCHLORIDE 5 MILLIGRAM(S): 40 TABLET ORAL at 21:26

## 2021-05-22 RX ADMIN — Medication 1 GRAM(S): at 11:37

## 2021-05-22 NOTE — PROGRESS NOTE ADULT - SUBJECTIVE AND OBJECTIVE BOX
Progress Note:  Provider Speciality                            Hospitalist      PAVAN SOUZA MRN-[id number] 40y Male     CHIEF PRESENTING COMPLAINT:  Patient is a 40y old  Male who presents with a chief complaint of anemia (21 May 2021 13:18)        SUBJECTIVE:  Patient was seen and examined at bedside. Reports improvement in  presenting complaint. No significant overnight events reported.     HISTORY OF PRESENTING ILLNESS:  HPI:  40 M PMH opoid use, peptic ulcer disease (last 15 years, found to have bleeding ulcer with Dr. Shah [facility] GI on endoscopy 1 month ago) presenting for anemia. Patient was feeling weak, tired, and getting easily fatigued. He states his friends told him he was looking pale. Went in for routine blood work, demonstrated hemoglobin 5 and patient was referred to emergency department. Patient does have previous history of melena and "throwing up blood" but denies needing previous transfusion. Patient was incarcerated at [facility] and released approx 8 months ago. He reports having his last endoscopy/colonoscopy 1 month ago with Dr. Shah, at time said to have found bleeding ulcers with a normal colonoscopy.      Denies EtOH abuse, fever, chill, chest pain, cough, abdominal pain, diarrhea, constipation, melena, hematochezia, hematemesis, hematuria.   In ED, VSS. Labs show hemoglobin 5.6 with prelim iron studies being consistent with SHEELA.  ADAM (-). Patient give IV protonix push and ordered for 3u pRBC.    (20 May 2021 17:21)        REVIEW OF SYSTEMS:  Patient denies any headache, any vision complaints, runny nose, fever, chills, sore throat. Denies chest pain, shortness of breath, palpitation. Denies nausea, vomiting, abdominal pain, diarrhoea, Denies urinary burning, urgency, frequency, dysuria. Denies weakness in any part of the body or numbness.   At least 10 systems were reviewed in ROS. All systems reviewed  are within normal limits except for the complaints as described in Subjective.    PAST MEDICAL & SURGICAL HISTORY:  PAST MEDICAL & SURGICAL HISTORY:  Peptic ulcer disease    No significant past surgical history            VITAL SIGNS:  Vital Signs Last 24 Hrs  T(C): 36.8 (22 May 2021 07:55), Max: 37.1 (21 May 2021 15:30)  T(F): 98.2 (22 May 2021 07:55), Max: 98.8 (21 May 2021 15:30)  HR: 65 (22 May 2021 07:55) (65 - 76)  BP: 116/62 (22 May 2021 07:55) (98/47 - 120/86)  BP(mean): --  RR: 18 (22 May 2021 07:55) (18 - 22)  SpO2: 98% (21 May 2021 11:35) (98% - 98%)          PHYSICAL EXAMINATION:  Not in acute distress  General: No pallor, no icterus  HEENT:   EOMI, no JVD.  Heart: S1+S2 audible  Lungs: bilateral  fair air entry, no wheezing, no crepitations.  Abdomen: Soft, non-tender, non-distended , no  rigidity or guarding.  CNS: Awake alert, CN  grossly intact.  Extremities:  No edema            CONSULTS:  Consultant(s) Notes Reviewed by me.   Care Discussed with Consultants/Other Providers where required.        MEDICATIONS:  MEDICATIONS  (STANDING):  chlorhexidine 4% Liquid 1 Application(s) Topical <User Schedule>  melatonin 5 milliGRAM(s) Oral at bedtime  methadone    Tablet 10  Oral   methadone    Tablet 5 milliGRAM(s) Oral every 12 hours  pantoprazole    Tablet 40 milliGRAM(s) Oral every 12 hours  sucralfate suspension 1 Gram(s) Oral every 6 hours    MEDICATIONS  (PRN):            ASSESSMENT:    40 M PMH opoid use, peptic ulcer disease presenting for anemia. Patient was feeling weak, tired, and getting easily fatigued. Outpatient blood work demonstrated hemoglobin 5 and patient was referred to emergency department. He reports having his last endoscopy/colonoscopy 1 month ago with Dr. Shah, at time said to have found bleeding ulcers with a normal colonoscopy. In ED, VSS. Labs show hemoglobin 5.6 with prelim iron studies being consistent with SHEELA.  ADAM (-). Patient give IV protonix push and ordered for 3u pRBC.     Symptomatic anemia secondary to GIB  Suspected upper GI bleed likely from PUD  History of PUD  Iron Deficeiny Anemia secondary to GI bleed  Chronic Opoid dependence  Active nicotine dependence        S/p EGD 05/21- Erosive esophagitis.A 2 x 2 cm clean based ulcer at the GEJ along the gastric cardia. No blood was noted in the esophagus  or stomach. this ulcer did not require endoscopic intervention..   A large sliding hiatal hernia   Need follow up with Dr. Shah as outpatient for H. Pylori eradication test a  Hgb 5.6 on admission ( baseline 9.1)--> 3 UPRBC--> 8.3 -->9  Responding appropriately to transfusion  NO NSAIDS use  C/w po BID Protonix 40  Smoking cessation advised   Opoid dependence-methadone taper  Handoff:  Pending: Methadone taper  Family discussion:  Discharge disposition: Home possibly Monday after completing methadone taper

## 2021-05-23 LAB
ALBUMIN SERPL ELPH-MCNC: 4 G/DL — SIGNIFICANT CHANGE UP (ref 3.5–5.2)
ALP SERPL-CCNC: 74 U/L — SIGNIFICANT CHANGE UP (ref 30–115)
ALT FLD-CCNC: 11 U/L — SIGNIFICANT CHANGE UP (ref 0–41)
ANION GAP SERPL CALC-SCNC: 14 MMOL/L — SIGNIFICANT CHANGE UP (ref 7–14)
AST SERPL-CCNC: 24 U/L — SIGNIFICANT CHANGE UP (ref 0–41)
BASOPHILS # BLD AUTO: 0.08 K/UL — SIGNIFICANT CHANGE UP (ref 0–0.2)
BASOPHILS NFR BLD AUTO: 1.1 % — HIGH (ref 0–1)
BILIRUB SERPL-MCNC: 0.3 MG/DL — SIGNIFICANT CHANGE UP (ref 0.2–1.2)
BUN SERPL-MCNC: 13 MG/DL — SIGNIFICANT CHANGE UP (ref 10–20)
CALCIUM SERPL-MCNC: 9 MG/DL — SIGNIFICANT CHANGE UP (ref 8.5–10.1)
CHLORIDE SERPL-SCNC: 102 MMOL/L — SIGNIFICANT CHANGE UP (ref 98–110)
CO2 SERPL-SCNC: 21 MMOL/L — SIGNIFICANT CHANGE UP (ref 17–32)
CREAT SERPL-MCNC: 0.8 MG/DL — SIGNIFICANT CHANGE UP (ref 0.7–1.5)
EOSINOPHIL # BLD AUTO: 0.34 K/UL — SIGNIFICANT CHANGE UP (ref 0–0.7)
EOSINOPHIL NFR BLD AUTO: 4.6 % — SIGNIFICANT CHANGE UP (ref 0–8)
GLUCOSE SERPL-MCNC: 100 MG/DL — HIGH (ref 70–99)
HCT VFR BLD CALC: 33.4 % — LOW (ref 42–52)
HGB BLD-MCNC: 9.4 G/DL — LOW (ref 14–18)
IMM GRANULOCYTES NFR BLD AUTO: 0.7 % — HIGH (ref 0.1–0.3)
LYMPHOCYTES # BLD AUTO: 1.34 K/UL — SIGNIFICANT CHANGE UP (ref 1.2–3.4)
LYMPHOCYTES # BLD AUTO: 18.1 % — LOW (ref 20.5–51.1)
MCHC RBC-ENTMCNC: 19.3 PG — LOW (ref 27–31)
MCHC RBC-ENTMCNC: 28.1 G/DL — LOW (ref 32–37)
MCV RBC AUTO: 68.7 FL — LOW (ref 80–94)
MONOCYTES # BLD AUTO: 0.61 K/UL — HIGH (ref 0.1–0.6)
MONOCYTES NFR BLD AUTO: 8.3 % — SIGNIFICANT CHANGE UP (ref 1.7–9.3)
NEUTROPHILS # BLD AUTO: 4.97 K/UL — SIGNIFICANT CHANGE UP (ref 1.4–6.5)
NEUTROPHILS NFR BLD AUTO: 67.2 % — SIGNIFICANT CHANGE UP (ref 42.2–75.2)
NRBC # BLD: 0 /100 WBCS — SIGNIFICANT CHANGE UP (ref 0–0)
PLATELET # BLD AUTO: 232 K/UL — SIGNIFICANT CHANGE UP (ref 130–400)
POTASSIUM SERPL-MCNC: 4.5 MMOL/L — SIGNIFICANT CHANGE UP (ref 3.5–5)
POTASSIUM SERPL-SCNC: 4.5 MMOL/L — SIGNIFICANT CHANGE UP (ref 3.5–5)
PROT SERPL-MCNC: 6.8 G/DL — SIGNIFICANT CHANGE UP (ref 6–8)
RBC # BLD: 4.86 M/UL — SIGNIFICANT CHANGE UP (ref 4.7–6.1)
RBC # FLD: 26.4 % — HIGH (ref 11.5–14.5)
SODIUM SERPL-SCNC: 137 MMOL/L — SIGNIFICANT CHANGE UP (ref 135–146)
WBC # BLD: 7.39 K/UL — SIGNIFICANT CHANGE UP (ref 4.8–10.8)
WBC # FLD AUTO: 7.39 K/UL — SIGNIFICANT CHANGE UP (ref 4.8–10.8)

## 2021-05-23 RX ADMIN — Medication 5 MILLIGRAM(S): at 21:45

## 2021-05-23 RX ADMIN — Medication 1 GRAM(S): at 05:37

## 2021-05-23 RX ADMIN — PANTOPRAZOLE SODIUM 40 MILLIGRAM(S): 20 TABLET, DELAYED RELEASE ORAL at 05:37

## 2021-05-23 RX ADMIN — METHADONE HYDROCHLORIDE 5 MILLIGRAM(S): 40 TABLET ORAL at 09:04

## 2021-05-23 RX ADMIN — Medication 1 GRAM(S): at 17:17

## 2021-05-23 RX ADMIN — Medication 1 GRAM(S): at 11:42

## 2021-05-23 RX ADMIN — PANTOPRAZOLE SODIUM 40 MILLIGRAM(S): 20 TABLET, DELAYED RELEASE ORAL at 17:17

## 2021-05-23 RX ADMIN — Medication 1 GRAM(S): at 23:21

## 2021-05-23 NOTE — PROGRESS NOTE ADULT - SUBJECTIVE AND OBJECTIVE BOX
Patient was seen and examined. Spoke with RN. Chart reviewed.  No events overnight. Feeling much better and stable  Vital Signs Last 24 Hrs  T(F): 98.3 (23 May 2021 00:00), Max: 100 (22 May 2021 15:33)  HR: 71 (23 May 2021 00:00) (65 - 73)  BP: 121/74 (23 May 2021 00:00) (116/62 - 121/78)  SpO2: --  MEDICATIONS  (STANDING):  chlorhexidine 4% Liquid 1 Application(s) Topical <User Schedule>  melatonin 5 milliGRAM(s) Oral at bedtime  methadone    Tablet 10  Oral   methadone    Tablet 5 milliGRAM(s) Oral every 12 hours  pantoprazole    Tablet 40 milliGRAM(s) Oral every 12 hours  sucralfate suspension 1 Gram(s) Oral every 6 hours    MEDICATIONS  (PRN):    Labs:                        9.0    7.79  )-----------( 268      ( 22 May 2021 07:08 )             31.3     22 May 2021 07:08    139    |  106    |  13     ----------------------------<  91     4.5     |  23     |  0.9      Ca    8.5        22 May 2021 07:08    TPro  6.0    /  Alb  3.8    /  TBili  0.3    /  DBili  x      /  AST  15     /  ALT  10     /  AlkPhos  72     22 May 2021 07:08          General: comfortable, NAD      A/P:  39 yo man with active substance abuse admitted with acute GI blood loss anemia due to ulcer- now stable s/p transfusion and EGD; also seen by addiction medicine    PPI    daily CBC    outpt GI f/u    methadone as per addiction medicine    CATCH team f/u    hopefully safe discharge on Monday    DVT prophylaxis  Decubitus prevention- all measures as per RN protocol  Please call or text me with any questions or updates

## 2021-05-23 NOTE — PROGRESS NOTE ADULT - ASSESSMENT
40 M PMH opoid use, peptic ulcer disease presenting for anemia. Patient was feeling weak, tired, and getting easily fatigued. Outpatient blood work demonstrated hemoglobin 5 and patient was referred to emergency department. He reports having his last endoscopy/colonoscopy 1 month ago with Dr. Shah, at time said to have found bleeding ulcers with a normal colonoscopy. In ED, VSS. Labs show hemoglobin 5.6 with prelim iron studies being consistent with SHEELA.  ADAM (-). Patient give IV protonix push and ordered for 3u pRBC.       # Suspected upper GI bleed likely from PUD  # History of PUD  # Iron Deficeiny Anemia secondary to GI bleed  - NPO   - s/p 3u pRBC  - C/w IV BID Protonix 40  - Attempt to obtain records from Dr. Shah (op GI at [facility])  - LR at 75ml/hr hydration  - may need to be d/c on iron supplementation f/u rest of SHEELA  - CT angio abdomen and STAT GI eval if worsening bleeding/HD instability  - Smoking cessation advised   - Possible EGD in afternoon by GI    # Opoid dependence  - Patient reports use of oxycodone and methadone recreationally  - Oxy 5mg IR as needed for withdrawal symptoms  - Addiction medicine consulted    Diet: NPO  Activity: IAT  DVT Prophylaxis: Hold  GI Prophylaxis: Protonix BID IV  CHG Order  Code Status: Full  Disposition: From home  
40 M PMH opoid use, peptic ulcer disease presenting for anemia. Patient was feeling weak, tired, and getting easily fatigued. Outpatient blood work demonstrated hemoglobin 5 and patient was referred to emergency department. He reports having his last endoscopy/colonoscopy 1 month ago with Dr. Shah, at time said to have found bleeding ulcers with a normal colonoscopy. In ED, VSS. Labs show hemoglobin 5.6 with prelim iron studies being consistent with SHEELA.  ADAM (-). Patient give IV protonix push and ordered for 3u pRBC.       # Suspected upper GI bleed likely from PUD  # History of PUD  # Iron Deficeiny Anemia secondary to GI bleed  # Grade B esophagitis in the lower third of the esophagus compatible with nonspecific erosive esophagitis  # Sliding hiatal hernia noted with Hill grade 4 GEJ  - s/p 3u pRBC on admission  - Smoking cessation advised   - EGD 5/21:    1. Grade B esophagitis in the lower third of the esophagus compatible with nonspecific erosive esophagitis.   2. A 2 x 2 cm clean based ulcer (Wisdom class 3) was noted at the GEJ along the gastric cardia. No blood was noted in the esophagus or stomach --> no need for endoscopic intervention.   3. Erythema in the stomach compatible with non-erosive gastritis.(Biopsy).   4. Normal mucosa in the whole examined duodenum.   5. A large sliding hiatal hernia noted with Hill grade 4 GEJ.   - Avoid NSAIDS, alcohol and cessation.  - Pantoprazole 40 mg BID PO  - Oral Carafate suspension 1 gm ( 10 ml) 4 times a day  - Needs follow up EGD in 2 months  - F/U in [facility] in 4 weeks.  - F/u pathology results    # Opoid dependence  - Patient reports use of oxycodone and methadone recreationally  - Oxy 5mg IR as needed for withdrawal symptoms  - Addiction medicine consulted --> on methadone taper    Diet: NPO  Activity: IAT  DVT Prophylaxis: Hold  GI Prophylaxis: Protonix BID IV  CHG Order  Code Status: Full  Disposition: From home    
Assessment:  PAVAN SOUZA is a 40y old white Male, with history of opioid use disorder, benzodiazepine use disorder, PMH of gastric ulcers, anemia who was admitted for anemia. Addiction medicine consulted for patient's opioid use, with patient requesting detox.  On evaluation, patient likely at risk of opioid withdrawal, requesting detox from substances. Patient was advised of high risk of relapse with detox rather than induction of medication-assisted treatment of opioid use disorder, but patient adamant on detox. Patient has COWS score of 6 at time of interview, indicative of mild withdrawal, though per chart patient received oxycodone 5mg a few hours prior to interview. Patient is appropriate for regular methadone taper for detox purposes.  CIWA-Ar score is 3 at time of interview, indicative of absent or minimal withdrawal. Given timeline, patient is at lower risk of acute benzodiazepine withdrawal at this time but should be monitored on CIWA librium protocol, not a good candidate for benzo taper.      Recommendations:  -can start methadone taper for opioid detox  -CIWA protocol for benzodiazepine withdrawal  -urine toxicology, preferably as soon as possible, before patient further metabolizes and clears substances leading to false negative results  -CATCH team following patient

## 2021-05-23 NOTE — PROGRESS NOTE ADULT - SUBJECTIVE AND OBJECTIVE BOX
24H events:    Patient is a 40y old Male who presents with a chief complaint of anemia (21 May 2021 13:18)    Primary diagnosis of Anemia       Today is hospital day 3d. This morning patient was seen and examined at bedside, resting comfortably in bed.    No acute or major events overnight.    PAST MEDICAL & SURGICAL HISTORY  Peptic ulcer disease    No significant past surgical history      SOCIAL HISTORY:  Social History:  Patient was incarcerated at [facility] and released approx 8 months ago.  Smokes occasioally, takes oxycodone (illicit), denies EtOH use. (20 May 2021 17:21)      ALLERGIES:  No Known Allergies    MEDICATIONS:  STANDING MEDICATIONS  chlorhexidine 4% Liquid 1 Application(s) Topical <User Schedule>  melatonin 5 milliGRAM(s) Oral at bedtime  methadone    Tablet 10  Oral   methadone    Tablet 5 milliGRAM(s) Oral every 12 hours  pantoprazole    Tablet 40 milliGRAM(s) Oral every 12 hours  sucralfate suspension 1 Gram(s) Oral every 6 hours    PRN MEDICATIONS    VITALS:   T(F): 99  HR: 80  BP: 117/78  RR: 20  SpO2: --    PHYSICAL EXAM:  GENERAL: NAD, well-groomed, well-developed  HEAD:  Atraumatic, Normocephalic  EYES: EOMI  NECK: Supple  NERVOUS SYSTEM:  Alert & Oriented X3, non focal   CHEST/LUNG: Clear to auscultation bilaterally; No rales, rhonchi, wheezing, or rubs  HEART: Regular rate and rhythm; No murmurs, rubs, or gallops  ABDOMEN: Soft, Nontender, Nondistended; Bowel sounds present  EXTREMITIES:  2+ Peripheral Pulses, No clubbing, cyanosis, or edema  LYMPH: No lymphadenopathy noted  SKIN: No rashes or lesions  LABS:                        9.0    7.79  )-----------( 268      ( 22 May 2021 07:08 )             31.3     05-22    139  |  106  |  13  ----------------------------<  91  4.5   |  23  |  0.9    Ca    8.5      22 May 2021 07:08    TPro  6.0  /  Alb  3.8  /  TBili  0.3  /  DBili  x   /  AST  15  /  ALT  10  /  AlkPhos  72  05-22                  RADIOLOGY:

## 2021-05-24 ENCOUNTER — TRANSCRIPTION ENCOUNTER (OUTPATIENT)
Age: 41
End: 2021-05-24

## 2021-05-24 VITALS — TEMPERATURE: 98 F | DIASTOLIC BLOOD PRESSURE: 69 MMHG | HEART RATE: 86 BPM | SYSTOLIC BLOOD PRESSURE: 121 MMHG

## 2021-05-24 LAB
ALBUMIN SERPL ELPH-MCNC: 3.9 G/DL — SIGNIFICANT CHANGE UP (ref 3.5–5.2)
ALP SERPL-CCNC: 66 U/L — SIGNIFICANT CHANGE UP (ref 30–115)
ALT FLD-CCNC: 11 U/L — SIGNIFICANT CHANGE UP (ref 0–41)
ANION GAP SERPL CALC-SCNC: 12 MMOL/L — SIGNIFICANT CHANGE UP (ref 7–14)
AST SERPL-CCNC: 17 U/L — SIGNIFICANT CHANGE UP (ref 0–41)
BASOPHILS # BLD AUTO: 0.09 K/UL — SIGNIFICANT CHANGE UP (ref 0–0.2)
BASOPHILS NFR BLD AUTO: 1.1 % — HIGH (ref 0–1)
BILIRUB SERPL-MCNC: 0.3 MG/DL — SIGNIFICANT CHANGE UP (ref 0.2–1.2)
BUN SERPL-MCNC: 16 MG/DL — SIGNIFICANT CHANGE UP (ref 10–20)
CALCIUM SERPL-MCNC: 8.7 MG/DL — SIGNIFICANT CHANGE UP (ref 8.5–10.1)
CHLORIDE SERPL-SCNC: 104 MMOL/L — SIGNIFICANT CHANGE UP (ref 98–110)
CO2 SERPL-SCNC: 23 MMOL/L — SIGNIFICANT CHANGE UP (ref 17–32)
CREAT SERPL-MCNC: 0.9 MG/DL — SIGNIFICANT CHANGE UP (ref 0.7–1.5)
EOSINOPHIL # BLD AUTO: 0.4 K/UL — SIGNIFICANT CHANGE UP (ref 0–0.7)
EOSINOPHIL NFR BLD AUTO: 4.9 % — SIGNIFICANT CHANGE UP (ref 0–8)
GLUCOSE SERPL-MCNC: 88 MG/DL — SIGNIFICANT CHANGE UP (ref 70–99)
HCT VFR BLD CALC: 32.7 % — LOW (ref 42–52)
HGB BLD-MCNC: 9.4 G/DL — LOW (ref 14–18)
IMM GRANULOCYTES NFR BLD AUTO: 0.6 % — HIGH (ref 0.1–0.3)
LYMPHOCYTES # BLD AUTO: 2.02 K/UL — SIGNIFICANT CHANGE UP (ref 1.2–3.4)
LYMPHOCYTES # BLD AUTO: 24.5 % — SIGNIFICANT CHANGE UP (ref 20.5–51.1)
MCHC RBC-ENTMCNC: 19.5 PG — LOW (ref 27–31)
MCHC RBC-ENTMCNC: 28.7 G/DL — LOW (ref 32–37)
MCV RBC AUTO: 68 FL — LOW (ref 80–94)
MONOCYTES # BLD AUTO: 0.86 K/UL — HIGH (ref 0.1–0.6)
MONOCYTES NFR BLD AUTO: 10.4 % — HIGH (ref 1.7–9.3)
NEUTROPHILS # BLD AUTO: 4.82 K/UL — SIGNIFICANT CHANGE UP (ref 1.4–6.5)
NEUTROPHILS NFR BLD AUTO: 58.5 % — SIGNIFICANT CHANGE UP (ref 42.2–75.2)
NRBC # BLD: 0 /100 WBCS — SIGNIFICANT CHANGE UP (ref 0–0)
PLATELET # BLD AUTO: 213 K/UL — SIGNIFICANT CHANGE UP (ref 130–400)
POTASSIUM SERPL-MCNC: 4.4 MMOL/L — SIGNIFICANT CHANGE UP (ref 3.5–5)
POTASSIUM SERPL-SCNC: 4.4 MMOL/L — SIGNIFICANT CHANGE UP (ref 3.5–5)
PROT SERPL-MCNC: 6.2 G/DL — SIGNIFICANT CHANGE UP (ref 6–8)
RBC # BLD: 4.81 M/UL — SIGNIFICANT CHANGE UP (ref 4.7–6.1)
RBC # FLD: 26.5 % — HIGH (ref 11.5–14.5)
SODIUM SERPL-SCNC: 139 MMOL/L — SIGNIFICANT CHANGE UP (ref 135–146)
SURGICAL PATHOLOGY STUDY: SIGNIFICANT CHANGE UP
WBC # BLD: 8.24 K/UL — SIGNIFICANT CHANGE UP (ref 4.8–10.8)
WBC # FLD AUTO: 8.24 K/UL — SIGNIFICANT CHANGE UP (ref 4.8–10.8)

## 2021-05-24 RX ORDER — SUCRALFATE 1 G
10 TABLET ORAL
Qty: 560 | Refills: 0
Start: 2021-05-24 | End: 2021-06-06

## 2021-05-24 RX ORDER — PANTOPRAZOLE SODIUM 20 MG/1
1 TABLET, DELAYED RELEASE ORAL
Qty: 60 | Refills: 0
Start: 2021-05-24 | End: 2021-06-22

## 2021-05-24 RX ADMIN — Medication 1 GRAM(S): at 06:04

## 2021-05-24 RX ADMIN — PANTOPRAZOLE SODIUM 40 MILLIGRAM(S): 20 TABLET, DELAYED RELEASE ORAL at 06:04

## 2021-05-24 RX ADMIN — Medication 1 GRAM(S): at 11:52

## 2021-05-24 NOTE — DISCHARGE NOTE NURSING/CASE MANAGEMENT/SOCIAL WORK - CAREGIVER PHONE NUMBER
85 year old female nonsmoker with PMHx of lung adenocarcinoma (likely multiple primaries) with complicated oncologic hx, well known to our team last seen in Nov 2020 for recurrent pleural effusion s/p right talc pleurodesis presenting with acute on chronic respiratory failure.     ·	Chronic respiratory failure  ·	lung adenocarcinoma, metachronous / multiple primaries.  ·	Hx Recurrent pleural effusion s/p right chemical pleurodesis.   ·	Dysphagia  Per hx patient was hypoxic while on baseline 2L NC at nursing home, CXR In ED showed pleural effusion. POCUS assessment shows Trace to no pleural effusion bilaterally. Patient did receive 10 mg IV lasix in ED. She is satting 94% on 2 LNC. NO intervention needed as there is almost NO pleural effusion suggesting successful Right pleurodesis. Prior pleural fluid or pleural biopsy never was malignant.   - Not sure what happened that caused her to move to [place].   - Start Lasix 20 mg once a day.   - She should follow up with Dr. Nguyen outpatient.   - Consider work up of dysphagia , she c/o coughing / choking when eating ot drinking.      [id number] 85 year old female nonsmoker with PMHx of lung adenocarcinoma (likely multiple primaries) with complicated oncologic hx, well known to our team last seen in Nov 2020 for recurrent pleural effusion s/p right talc pleurodesis presenting with acute on chronic respiratory failure.     ·	Chronic respiratory failure  ·	lung adenocarcinoma, metachronous / multiple primaries.  ·	Hx Recurrent pleural effusion s/p right chemical pleurodesis.   ·	Dysphagia  Per hx patient was hypoxic while on baseline 2L NC at nursing home, CXR In ED showed pleural effusion. POCUS assessment shows Trace to no pleural effusion bilaterally. Patient did receive 10 mg IV lasix in ED. She is satting 94% on 2 LNC. NO intervention needed as there is almost NO pleural effusion suggesting successful Right pleurodesis. Prior pleural fluid or pleural biopsy never was malignant.   - Not sure what happened that caused her to move to [place]. She has a known PFO which might be leading to transient shunt leading to desaturation.     - Start Lasix 20 mg once a day.   - She should follow up with Dr. Nguyen outpatient.   - Consider work up of dysphagia , she c/o coughing / choking when eating ot drinking.

## 2021-05-24 NOTE — DISCHARGE NOTE PROVIDER - PROVIDER TOKENS
PROVIDER:[TOKEN:[[id number]],FOLLOWUP:[1 week]],PROVIDER:[TOKEN:[[id number]],FOLLOWUP:[2 weeks]]

## 2021-05-24 NOTE — PROGRESS NOTE ADULT - SUBJECTIVE AND OBJECTIVE BOX
Patient was seen and examined. Spoke with HO. Chart reviewed.  No events overnight.  Vital Signs Last 24 Hrs  T(F): 98.1 (24 May 2021 00:00), Max: 99 (23 May 2021 15:49)  HR: 99 (24 May 2021 00:00) (94 - 99)  BP: 114/65 (24 May 2021 00:00) (114/65 - 120/75)  SpO2: --  MEDICATIONS  (STANDING):  chlorhexidine 4% Liquid 1 Application(s) Topical <User Schedule>  melatonin 5 milliGRAM(s) Oral at bedtime  pantoprazole    Tablet 40 milliGRAM(s) Oral every 12 hours  sucralfate suspension 1 Gram(s) Oral every 6 hours    MEDICATIONS  (PRN):    Labs:                        9.4    8.24  )-----------( 213      ( 24 May 2021 05:13 )             32.7                         9.4    7.39  )-----------( 232      ( 23 May 2021 08:48 )             33.4     24 May 2021 05:13    139    |  104    |  16     ----------------------------<  88     4.4     |  23     |  0.9    23 May 2021 08:48    137    |  102    |  13     ----------------------------<  100    4.5     |  21     |  0.8      Ca    8.7        24 May 2021 05:13  Ca    9.0        23 May 2021 08:48    TPro  6.2    /  Alb  3.9    /  TBili  0.3    /  DBili  x      /  AST  17     /  ALT  11     /  AlkPhos  66     24 May 2021 05:13  TPro  6.8    /  Alb  4.0    /  TBili  0.3    /  DBili  x      /  AST  24     /  ALT  11     /  AlkPhos  74     23 May 2021 08:48          General: comfortable, NAD  Neurology: A&Ox3, nonfocal      A/P:  41 yo man with active substance abuse admitted with acute GI blood loss anemia due to ulcer- now stable s/p transfusion and EGD; also seen by addiction medicine    PPI/ carafate    outpt GI f/u    methadone as per addiction medicine    addiction medicine/ CATCH team f/u today    DC today once plan set by addiction medicine    pt aware of risk of death with  use of any controlled substances or drugs    DVT prophylaxis  Decubitus prevention- all measures as per RN protocol  Please call or text me with any questions or updates

## 2021-05-24 NOTE — CHART NOTE - NSCHARTNOTEFT_GEN_A_CORE
<<<RESIDENT DISCHARGE NOTE>>>     PAVAN SOUZA  MRN-[id number]    VITAL SIGNS:  T(F): 97.6 (05-24-21 @ 08:00), Max: 99 (05-23-21 @ 15:49)  HR: 89 (05-24-21 @ 08:00)  BP: 117/71 (05-24-21 @ 08:00)  SpO2: --      PHYSICAL EXAMINATION:  General: NAD  Head & Neck: NC./ AT, neck supple  Pulmonary: Clear to ausculation b/l  Cardiovascular: RRR, +s1 s2  Gastrointestinal/Abdomen & Pelvis: non tender, non distended  Neurologic/Motor: no foacl deficits    TEST RESULTS:                        9.4    8.24  )-----------( 213      ( 24 May 2021 05:13 )             32.7       05-24    139  |  104  |  16  ----------------------------<  88  4.4   |  23  |  0.9    Ca    8.7      24 May 2021 05:13    TPro  6.2  /  Alb  3.9  /  TBili  0.3  /  DBili  x   /  AST  17  /  ALT  11  /  AlkPhos  66  05-24      FINAL DISCHARGE INTERVIEW:  Resident(s) Present: (Name:_Dr. Potter____________),     DISCHARGE MEDICATION RECONCILIATION  reviewed with Attending (Name:___Dr. Obregon________)    DISPOSITION:   [ x ] Home,    [  ] Home with Visiting Nursing Services,   [    ]  SNF/ NH,    [   ] Acute Rehab (4A),   [   ] Other (Specify:_________)

## 2021-05-24 NOTE — DISCHARGE NOTE PROVIDER - NSDCCPCAREPLAN_GEN_ALL_CORE_FT
PRINCIPAL DISCHARGE DIAGNOSIS  Diagnosis: Anemia  Assessment and Plan of Treatment: You were admitted for symptomatic anemas secondary to suspected upper GI bleeding. You were transfused with red blood cells. You had EGD on 5.21.21 done, which showed nonspecific erosive esophagitis, and peptic ulcer along the gastric cardia; non-erosive gastritis, hiatal hernia. You were treated with pantoprazole and carafate. Please take medications as prescribed, and follow up with GI for pathiology results at [facility] and  follow up with Dr. Shah as outpatient for H. Pylori eradication test.        SECONDARY DISCHARGE DIAGNOSES  Diagnosis: Opioid dependence  Assessment and Plan of Treatment: You were seen by Addiction medicine and started on methadone taper. Please follow up with methadone clinic outpatient.

## 2021-05-24 NOTE — DISCHARGE NOTE PROVIDER - NSDCMRMEDTOKEN_GEN_ALL_CORE_FT
pantoprazole 40 mg oral delayed release tablet: 1 tab(s) orally every 12 hours  sucralfate 1 g/10 mL oral suspension: 10 milliliter(s) orally every 6 hours

## 2021-05-24 NOTE — PROGRESS NOTE ADULT - PROVIDER SPECIALTY LIST ADULT
Internal Medicine
Hospitalist
Addiction Medicine

## 2021-05-24 NOTE — DISCHARGE NOTE PROVIDER - HOSPITAL COURSE
40 M PMH opoid use, peptic ulcer disease presenting for anemia. Patient was feeling weak, tired, and getting easily fatigued. Outpatient blood work demonstrated hemoglobin 5 and patient was referred to emergency department. He reports having his last endoscopy/colonoscopy 1 month ago with Dr. Shah, at time said to have found bleeding ulcers with a normal colonoscopy. In ED, VSS. Labs show hemoglobin 5.6 with prelim iron studies being consistent with SHEELA.  ADAM (-). Patient give IV protonix push and ordered for 3u pRBC.     # Suspected upper GI bleed likely from PUD  # History of PUD  # Iron Deficeiny Anemia secondary to GI bleed  # Grade B esophagitis in the lower third of the esophagus compatible with nonspecific erosive esophagitis  # Sliding hiatal hernia noted with Hill grade 4 GEJ  Pt is s/p 3u pRBC on admission  EGD 5/21/21 showed:    1. Grade B esophagitis in the lower third of the esophagus compatible with nonspecific erosive esophagitis.   2. A 2 x 2 cm clean based ulcer (Sutton class 3) was noted at the GEJ along the gastric cardia. No blood was noted in the esophagus or stomach --> no need for endoscopic intervention.   3. Erythema in the stomach compatible with non-erosive gastritis.(Biopsy).   4. Normal mucosa in the whole examined duodenum.   5. A large sliding hiatal hernia noted with Hill grade 4 GEJ.     Pt was recommended to avoid NSAIDS, alcohol cessation. Pt is treated with Pantoprazole 40 mg BID PO and Oral Carafate suspension 1 gm ( 10 ml) 4 times a day. Pt will need follow up EGD in 2 month and F/U in GI MAP clinic in 4 weeks for pathology results.    Pt was managed for opoid dependence with Oxy 5mg IR as needed for withdrawal symptoms. Pt was seen by addiction medicine --> on methadone taper.    Pt is stable for discharge with outpatient follow up with GI and methadone clinic.

## 2021-05-24 NOTE — DISCHARGE NOTE PROVIDER - CARE PROVIDERS DIRECT ADDRESSES
[email or web address]

## 2021-05-24 NOTE — DISCHARGE NOTE NURSING/CASE MANAGEMENT/SOCIAL WORK - PATIENT PORTAL LINK FT
You can access the FollowMyHealth Patient Portal offered by [facility] by registering at the following website: [email or web address]. By joining [employer]’s FollowMyHealth portal, you will also be able to view your health information using other applications (apps) compatible with our system.

## 2021-05-24 NOTE — DISCHARGE NOTE PROVIDER - CARE PROVIDER_API CALL
Dao Thomas  INTERNAL MEDICINE  [address] [place] [address]  Phone: [phone]  Fax: [phone]  Follow Up Time: 1 week    Kevin Soriano  Gastroenterology  [address] [place] [address]  Phone: [phone]  Fax: [phone]  Follow Up Time: 2 weeks

## 2021-05-25 ENCOUNTER — NON-APPOINTMENT (OUTPATIENT)
Age: 41
End: 2021-05-25

## 2021-05-28 DIAGNOSIS — D64.9 ANEMIA, UNSPECIFIED: ICD-10-CM

## 2021-05-28 DIAGNOSIS — K22.11 ULCER OF ESOPHAGUS WITH BLEEDING: ICD-10-CM

## 2021-05-28 DIAGNOSIS — K44.9 DIAPHRAGMATIC HERNIA WITHOUT OBSTRUCTION OR GANGRENE: ICD-10-CM

## 2021-05-28 DIAGNOSIS — D62 ACUTE POSTHEMORRHAGIC ANEMIA: ICD-10-CM

## 2021-05-28 DIAGNOSIS — K29.71 GASTRITIS, UNSPECIFIED, WITH BLEEDING: ICD-10-CM

## 2021-05-28 DIAGNOSIS — F11.23 OPIOID DEPENDENCE WITH WITHDRAWAL: ICD-10-CM

## 2021-05-28 DIAGNOSIS — K25.4 CHRONIC OR UNSPECIFIED GASTRIC ULCER WITH HEMORRHAGE: ICD-10-CM

## 2021-05-28 DIAGNOSIS — F17.210 NICOTINE DEPENDENCE, CIGARETTES, UNCOMPLICATED: ICD-10-CM

## 2021-06-05 ENCOUNTER — INPATIENT (INPATIENT)
Facility: HOSPITAL | Age: 41
LOS: 8 days | Discharge: HOME | End: 2021-06-14
Attending: THORACIC SURGERY (CARDIOTHORACIC VASCULAR SURGERY) | Admitting: THORACIC SURGERY (CARDIOTHORACIC VASCULAR SURGERY)
Payer: MEDICAID

## 2021-06-05 VITALS
WEIGHT: 175.05 LBS | RESPIRATION RATE: 18 BRPM | OXYGEN SATURATION: 94 % | HEART RATE: 117 BPM | DIASTOLIC BLOOD PRESSURE: 78 MMHG | TEMPERATURE: 99 F | SYSTOLIC BLOOD PRESSURE: 128 MMHG | HEIGHT: 72 IN

## 2021-06-05 LAB
ALBUMIN SERPL ELPH-MCNC: 3.1 G/DL — LOW (ref 3.5–5.2)
ALP SERPL-CCNC: 212 U/L — HIGH (ref 30–115)
ALT FLD-CCNC: 37 U/L — SIGNIFICANT CHANGE UP (ref 0–41)
ANION GAP SERPL CALC-SCNC: 13 MMOL/L — SIGNIFICANT CHANGE UP (ref 7–14)
ANISOCYTOSIS BLD QL: SIGNIFICANT CHANGE UP
AST SERPL-CCNC: 25 U/L — SIGNIFICANT CHANGE UP (ref 0–41)
BASOPHILS # BLD AUTO: 0.17 K/UL — SIGNIFICANT CHANGE UP (ref 0–0.2)
BASOPHILS NFR BLD AUTO: 0.9 % — SIGNIFICANT CHANGE UP (ref 0–1)
BILIRUB SERPL-MCNC: 0.2 MG/DL — SIGNIFICANT CHANGE UP (ref 0.2–1.2)
BUN SERPL-MCNC: 15 MG/DL — SIGNIFICANT CHANGE UP (ref 10–20)
CALCIUM SERPL-MCNC: 8.5 MG/DL — SIGNIFICANT CHANGE UP (ref 8.5–10.1)
CHLORIDE SERPL-SCNC: 97 MMOL/L — LOW (ref 98–110)
CO2 SERPL-SCNC: 22 MMOL/L — SIGNIFICANT CHANGE UP (ref 17–32)
CREAT SERPL-MCNC: 1 MG/DL — SIGNIFICANT CHANGE UP (ref 0.7–1.5)
EOSINOPHIL # BLD AUTO: 0.17 K/UL — SIGNIFICANT CHANGE UP (ref 0–0.7)
EOSINOPHIL NFR BLD AUTO: 0.9 % — SIGNIFICANT CHANGE UP (ref 0–8)
GIANT PLATELETS BLD QL SMEAR: PRESENT — SIGNIFICANT CHANGE UP
GLUCOSE SERPL-MCNC: 128 MG/DL — HIGH (ref 70–99)
HCT VFR BLD CALC: 25.9 % — LOW (ref 42–52)
HGB BLD-MCNC: 7.5 G/DL — LOW (ref 14–18)
HYPOCHROMIA BLD QL: SIGNIFICANT CHANGE UP
LACTATE SERPL-SCNC: 1.5 MMOL/L — SIGNIFICANT CHANGE UP (ref 0.7–2)
LYMPHOCYTES # BLD AUTO: 13.1 % — LOW (ref 20.5–51.1)
LYMPHOCYTES # BLD AUTO: 2.48 K/UL — SIGNIFICANT CHANGE UP (ref 1.2–3.4)
MANUAL SMEAR VERIFICATION: SIGNIFICANT CHANGE UP
MCHC RBC-ENTMCNC: 19.1 PG — LOW (ref 27–31)
MCHC RBC-ENTMCNC: 29 G/DL — LOW (ref 32–37)
MCV RBC AUTO: 66.1 FL — LOW (ref 80–94)
METAMYELOCYTES # FLD: 0.9 % — HIGH (ref 0–0)
MICROCYTES BLD QL: SIGNIFICANT CHANGE UP
MONOCYTES # BLD AUTO: 1 K/UL — HIGH (ref 0.1–0.6)
MONOCYTES NFR BLD AUTO: 5.3 % — SIGNIFICANT CHANGE UP (ref 1.7–9.3)
MYELOCYTES NFR BLD: 2.6 % — HIGH (ref 0–0)
NEUTROPHILS # BLD AUTO: 14.44 K/UL — HIGH (ref 1.4–6.5)
NEUTROPHILS NFR BLD AUTO: 74.6 % — SIGNIFICANT CHANGE UP (ref 42.2–75.2)
NEUTS BAND # BLD: 1.7 % — SIGNIFICANT CHANGE UP (ref 0–6)
NT-PROBNP SERPL-SCNC: 59 PG/ML — SIGNIFICANT CHANGE UP (ref 0–300)
OVALOCYTES BLD QL SMEAR: SIGNIFICANT CHANGE UP
PLAT MORPH BLD: NORMAL — SIGNIFICANT CHANGE UP
PLATELET # BLD AUTO: 420 K/UL — HIGH (ref 130–400)
POIKILOCYTOSIS BLD QL AUTO: SIGNIFICANT CHANGE UP
POLYCHROMASIA BLD QL SMEAR: SLIGHT — SIGNIFICANT CHANGE UP
POTASSIUM SERPL-MCNC: 3.6 MMOL/L — SIGNIFICANT CHANGE UP (ref 3.5–5)
POTASSIUM SERPL-SCNC: 3.6 MMOL/L — SIGNIFICANT CHANGE UP (ref 3.5–5)
PROT SERPL-MCNC: 5.6 G/DL — LOW (ref 6–8)
RBC # BLD: 3.92 M/UL — LOW (ref 4.7–6.1)
RBC # FLD: 26.4 % — HIGH (ref 11.5–14.5)
RBC BLD AUTO: ABNORMAL
SARS-COV-2 RNA SPEC QL NAA+PROBE: SIGNIFICANT CHANGE UP
SODIUM SERPL-SCNC: 132 MMOL/L — LOW (ref 135–146)
TROPONIN T SERPL-MCNC: <0.01 NG/ML — SIGNIFICANT CHANGE UP
WBC # BLD: 18.93 K/UL — HIGH (ref 4.8–10.8)
WBC # FLD AUTO: 18.93 K/UL — HIGH (ref 4.8–10.8)

## 2021-06-05 PROCEDURE — 71260 CT THORAX DX C+: CPT | Mod: 26

## 2021-06-05 PROCEDURE — 99223 1ST HOSP IP/OBS HIGH 75: CPT

## 2021-06-05 PROCEDURE — 93010 ELECTROCARDIOGRAM REPORT: CPT

## 2021-06-05 PROCEDURE — 71046 X-RAY EXAM CHEST 2 VIEWS: CPT | Mod: 26

## 2021-06-05 PROCEDURE — 99285 EMERGENCY DEPT VISIT HI MDM: CPT

## 2021-06-05 RX ORDER — AMPICILLIN SODIUM AND SULBACTAM SODIUM 250; 125 MG/ML; MG/ML
3 INJECTION, POWDER, FOR SUSPENSION INTRAMUSCULAR; INTRAVENOUS ONCE
Refills: 0 | Status: COMPLETED | OUTPATIENT
Start: 2021-06-05 | End: 2021-06-05

## 2021-06-05 RX ORDER — ACETAMINOPHEN 500 MG
975 TABLET ORAL ONCE
Refills: 0 | Status: COMPLETED | OUTPATIENT
Start: 2021-06-05 | End: 2021-06-05

## 2021-06-05 RX ORDER — AMPICILLIN SODIUM AND SULBACTAM SODIUM 250; 125 MG/ML; MG/ML
INJECTION, POWDER, FOR SUSPENSION INTRAMUSCULAR; INTRAVENOUS
Refills: 0 | Status: DISCONTINUED | OUTPATIENT
Start: 2021-06-05 | End: 2021-06-08

## 2021-06-05 RX ORDER — METRONIDAZOLE 500 MG
500 TABLET ORAL ONCE
Refills: 0 | Status: DISCONTINUED | OUTPATIENT
Start: 2021-06-05 | End: 2021-06-05

## 2021-06-05 RX ORDER — SODIUM CHLORIDE 9 MG/ML
1000 INJECTION INTRAMUSCULAR; INTRAVENOUS; SUBCUTANEOUS ONCE
Refills: 0 | Status: COMPLETED | OUTPATIENT
Start: 2021-06-05 | End: 2021-06-05

## 2021-06-05 RX ORDER — SUCRALFATE 1 G
1 TABLET ORAL EVERY 6 HOURS
Refills: 0 | Status: DISCONTINUED | OUTPATIENT
Start: 2021-06-05 | End: 2021-06-09

## 2021-06-05 RX ORDER — AMPICILLIN SODIUM AND SULBACTAM SODIUM 250; 125 MG/ML; MG/ML
3 INJECTION, POWDER, FOR SUSPENSION INTRAMUSCULAR; INTRAVENOUS EVERY 6 HOURS
Refills: 0 | Status: DISCONTINUED | OUTPATIENT
Start: 2021-06-06 | End: 2021-06-08

## 2021-06-05 RX ORDER — PANTOPRAZOLE SODIUM 20 MG/1
40 TABLET, DELAYED RELEASE ORAL
Refills: 0 | Status: DISCONTINUED | OUTPATIENT
Start: 2021-06-05 | End: 2021-06-09

## 2021-06-05 RX ORDER — CEFEPIME 1 G/1
2000 INJECTION, POWDER, FOR SOLUTION INTRAMUSCULAR; INTRAVENOUS ONCE
Refills: 0 | Status: COMPLETED | OUTPATIENT
Start: 2021-06-05 | End: 2021-06-05

## 2021-06-05 RX ADMIN — AMPICILLIN SODIUM AND SULBACTAM SODIUM 200 GRAM(S): 250; 125 INJECTION, POWDER, FOR SUSPENSION INTRAMUSCULAR; INTRAVENOUS at 21:32

## 2021-06-05 RX ADMIN — CEFEPIME 100 MILLIGRAM(S): 1 INJECTION, POWDER, FOR SOLUTION INTRAMUSCULAR; INTRAVENOUS at 19:59

## 2021-06-05 RX ADMIN — SODIUM CHLORIDE 1000 MILLILITER(S): 9 INJECTION INTRAMUSCULAR; INTRAVENOUS; SUBCUTANEOUS at 16:04

## 2021-06-05 RX ADMIN — Medication 975 MILLIGRAM(S): at 16:04

## 2021-06-05 NOTE — H&P ADULT - ATTENDING COMMENTS
40 M PMH opoid use, peptic ulcer disease and GI bleeding came to ED for SOB, cough and chest pain for few days, he was started on Cefuroxime and doxycycline for pneumonia on CXR outpatient. But symptoms continued so he came toED, he admitted he was uing  Oxycodone and Heroin and he thinks he aspirated few days ago. In ED, Vital signs  tachycardia to 117. X-ray shows bilateral pna. ADAM negative. Labs showed Hb 7    PHYSICAL EXAM:  GENERAL: mild respiratory distress.  HEAD:  Atraumatic, Normocephalic.  EYES: EOMI, PERRLA, conjunctiva and sclera clear.  NECK: Supple, No JVD.  CHEST/LUNG: Bilateral ronchi, decrease breath sound on lower lungs.   HEART: Regular rate and rhythm; S1 S2.   ABDOMEN: Soft, Nontender, Nondistended; Bowel sounds present.  EXTREMITIES:  2+ Peripheral Pulses, No clubbing, cyanosis, or edema.  PSYCH: AAOx3.  NEUROLOGY: non-focal.  SKIN: No rashes or lesions.    A/P:   Sepsis  Bilateral pneumonia with left pleural effusion. Possibly aspiration.   CXR showed bilateral opacities with left pleural effusion.   Chest Ct showed Bilateral pneumonia, complicated on the left by pleural effusion with mild loculation and pleural thickening suspicious for empyema.  Continue Unasyn  Pending blood cx.   Pulmonary consult for thoracentesis.     Acute blood loess anemia  GI bleeding, history of PUD, patient with recent melena or coffee ground   Continue PPI BID IV, NPO, IV fluid.   Give 1 RBC , GI consult. Monitor CBC.     Opioid use disorder: Heroin and   Check urine toxicology  Give Methadone 10mg daily for withdrawal symptoms.

## 2021-06-05 NOTE — ED ADULT NURSE NOTE - OBJECTIVE STATEMENT
pt aox4 complaining of sob. denies chest pain. respirations even / unlabored. recently dx with pneumonia.  iv placed / labs sent.

## 2021-06-05 NOTE — ED ADULT NURSE NOTE - COVID-19 RESULT
Last refill on 5 23 2017 #30 with 1 refill  Last Thyroid blood work on 5 23 2017- Patient was to have her blood work repeated in 1 month - has not been done
NEGATIVE

## 2021-06-05 NOTE — ED PROVIDER NOTE - NS ED ROS FT
Pt here with c/o abdominal pain x last couple weeks.  PT states he's been nauseated and unable to eat like normal as well.  C/o generalized abdominal cramping pain.  Denies fevers.  Denies urinary complaints.  Pt has been taking more Pepto lately d/t symptoms.  Pt did have black stool tonight when he arrived here.  Pt has been under additional stress at work lately.    
Review of Systems:  	•	CONSTITUTIONAL - + fever, no diaphoresis, + chills  	•	SKIN - no rash  	•	HEMATOLOGIC - no bleeding, no bruising  	•	EYES - no eye pain, no blurry vision  	•	ENT - no change in hearing, no sore throat, no ear pain or tinnitus  	•	RESPIRATORY - + shortness of breath, + cough  	•	CARDIAC - + chest pain, no palpitations  	•	GI - no abd pain, no nausea, no vomiting, no diarrhea, no constipation  	•	GENITO-URINARY - no discharge, no dysuria; no hematuria, no increased urinary frequency  	•	MUSCULOSKELETAL - no joint paint, no swelling, no redness  	•	NEUROLOGIC - no weakness, no headache, no paresthesias, no LOC  	•	PSYCH - no anxiety, non suicidal, non homicidal, no hallucination, no depression

## 2021-06-05 NOTE — ED PROVIDER NOTE - PHYSICAL EXAMINATION
CONST: Well appearing in NAD  EYES: PERRL, EOMI, Sclera and conjunctiva clear.  ENT: No nasal discharge. Oropharynx normal appearing, no erythema or exudates. No abscess or swelling. Uvula midline.  NECK: Non-tender, no meningeal signs. normal ROM. supple   CARD: Normal S1 S2; Normal rate and rhythm  RESP: Equal BS B/L, No wheezes, rhonchi or rales. No distress  GI: Soft, non-tender, non-distended. no cva tenderness. normal BS  MS: Normal ROM in all extremities. No midline spinal tenderness. pulses 2 +. no calf tenderness or swelling  SKIN: Warm, dry, no acute rashes. Good turgor  NEURO: A&Ox3, No focal deficits

## 2021-06-05 NOTE — H&P ADULT - NSHPPHYSICALEXAM_GEN_ALL_CORE
VITAL SIGNS: AFebrile, vital signs stable  CONSTITUTIONAL: Well-developed; well-nourished; in no acute distress.  SKIN: Skin exam is warm and dry, no acute rash.  HEAD: Normocephalic; atraumatic.  EYES: Pupils equal round reactive to light, Extraocular movements intact; conjunctiva and sclera clear.  ENT: No nasal discharge; airway clear. Moist mucus membranes.  NECK: Supple; non tender. No rigidity  CARD: Regular rate and rhythm. Normal S1, S2;    RESP: decrease BS b/l, wheezes right>left. +pleurisy   ABD: Abdomen soft; non-tender; non-distended;  no hepatosplenomegaly.    EXT: Normal ROM. No clubbing, cyanosis or edema. No calf tenderness to palpation.  NEURO: Alert and oriented x 3. No focal deficits.  PSYCH: Cooperative, appropriate. anxious

## 2021-06-05 NOTE — H&P ADULT - NSHPSOCIALHISTORY_GEN_ALL_CORE
Patient was incarcerated at [facility] and released approx 9 months ago.  Smokes occasionally, takes oxycodone (illicit), denies EtOH use.

## 2021-06-05 NOTE — ED PROVIDER NOTE - ATTENDING CONTRIBUTION TO CARE
41 yo M with PMH of opioid use presents to ED for worsening SOB, cough, fevers, chills and CP since June 2. Pt went to urgent care on 6/3 and was diagnosed with multifocal pneumonia and was started on cefuroxime and doxycycline. No SOB, CP, palpitations.     Const: Well nourished, well developed, appears stated age  Eyes: PERRL, no conjunctival injection  HENT:  Neck supple without meningismus   CV: RRR, Warm, well-perfused extremities  RESP: diffuse crackle b/l  GI: soft, non-tender, non-distended  MSK: No gross deformities appreciated  Skin: Warm, dry. No rashes    Will do xray, labs

## 2021-06-05 NOTE — ED ADULT NURSE NOTE - NSIMPLEMENTINTERV_GEN_ALL_ED
Implemented All Universal Safety Interventions:  [place] to call system. Call bell, personal items and telephone within reach. Instruct patient to call for assistance. Room bathroom lighting operational. Non-slip footwear when patient is off stretcher. Physically safe environment: no spills, clutter or unnecessary equipment. Stretcher in lowest position, wheels locked, appropriate side rails in place.

## 2021-06-05 NOTE — H&P ADULT - ASSESSMENT
40 M PMH opoid use, peptic ulcer disease presenting for multifocal pneumonia.    IMPRESSION  Pneumonia   Acute on Chronic Microcytic Anemia  History Peptic Ulcer Disease    #Pneumonia  -check blood cultures  -check Legionella Ag  -check Strep Urine Ag  -begin levauqin and ceftriaxone  -ID eval    #Peptic Ulcer Disease  #Acute on Chronic Microcytic Anemia  -NPO    can give IV BID Protonix 40  -check LDH/haptoglobin (r/o hemolytic causes anemia especially in light of infection and high WBC)   -CT angio abdomen and STAT GI eval if worsening bleeding/HD instability  __________________  DIET-NPO  GI ppx- protonix IV BID  AAT  hold chemical dvt ppx for now  dispo-acute      NOTE IS INCOMPLETE 40 M PMH opoid use, peptic ulcer disease presenting for fever, chill, malaise. Outpatient dx with multifocal pneumonia. Used opiates 1 week ago, associated with coffee ground emesis episode at the time and a melena episode last night. In ED, found to be septic on admission (tachycardia 117, WBC 18k, source is pna), although hemodynamically stable and saturating well.     IMPRESSION  Multifocal Pneumonia  Sepsis Present on Admission   Acute on Chronic Microcytic Anemia  History Peptic Ulcer Disease    #Multifocal Pneumonia with Sepsis Present on Admission  -check blood cultures  -check Legionella Ag  -check Strep Urine Ag  -begin levaquin 750 mg Q24 and Unasyn 3mg Q6   -possible was related to aspiration event with opiate use as sx began shortly afterwards. Other risk would be HIV as patient was incarcerated Patient agreeable for HIV testing  -airborne precautions for TB until ID eval (CT scan not officially read, possible effusion + history incarceration)   -ID eval    #Coffee-Ground Emesis- 1 week ago   #Melena- occurred last night  #Peptic Ulcer Disease  #Acute on Chronic Microcytic Anemia- 9.4-7.5. ADAM negative   -NPO until GI eval  -can give IV BID Protonix 40  -CBC Q12  -two large bore IV  -Active T+S  -gentle hydration 75 cc/hr   -GI eval  -check LDH/haptoglobin (r/o hemolytic causes anemia especially in light of infection and high WBC)   -CT angio abdomen and STAT GI eval if worsening bleeding/HD instability  __________________  DIET-NPO, gentle hydration 75cc/hr   GI ppx- protonix IV BID  AAT  hold chemical dvt ppx for now  dispo-acute. pending ID and GI eval

## 2021-06-05 NOTE — H&P ADULT - HISTORY OF PRESENT ILLNESS
40 M PMH opoid use, peptic ulcer disease (last 15 years, found to have bleeding ulcer with Dr. Shah [facility] on endoscopy 2 month ago, recent admission for anemia with endoscopy showing hill grade 4 ) presents with cough, dyspnea,and chest pain.Symptoms began Jun 2. Patient went to Urgent Care June 3rd,found to have multi-focal pneumonia. Was started on cefuroxime and doxyclycine. Patient denies abdominal pain, diarrhea, constipation, melena, hematochezia, hematemesis, hematuria, weight loss or adominal pain,       In ED, VSS. X-ray shows bilateral pna.    40 M PMH opoid use, peptic ulcer disease (last 15 years, found to have bleeding ulcer with Dr. Shah [facility] on endoscopy 2 month ago, recent admission for anemia with endoscopy showing hill grade 4 ) presents with cough, dyspnea,and chest pain.Symptoms began Vu 2. Patient went to Urgent Care June 3rd,found to have multi-focal pneumonia. Was started on cefuroxime and doxycycline  at time. Patient accompanied by mother who is a nurse; she provided supplemental history. Upon interviewing patient in private, patient admitted to using oxycodone (oral/nasal ingestion) about 1 week ago. He threw up around the same time, and had "black vomitus". He also endorses having a melena episode last night.     Patient denies abdominal pain, diarrhea, constipation, hematochezia, hematuria, weight loss or abdominal pain,  In ED, VSS notable for tachycardia to 117. X-ray shows bilateral pna. ADAM negative.

## 2021-06-05 NOTE — ED PROVIDER NOTE - CLINICAL SUMMARY MEDICAL DECISION MAKING FREE TEXT BOX
41 yo M presented to ED for worsening SOB and cough due to worsening pneumonia. Concern for pleural effusion vs empyema. Will need IV antibiotics and possible surgical intervention. PT admitted for further management.

## 2021-06-05 NOTE — ED PROVIDER NOTE - OBJECTIVE STATEMENT
40 year old male with pmhx of opoid use, and PUD (recent admission for anemia - for EGD which demonstrated esophagitis, and ulcer), presents cough, sob, and chest pain since Jun 2, Pt went to  on Arelis 3 , and had xray which revealed bilateral multifocal pneumonia - started on cefuroxime and doxycycline. pt denies abd pain, nausea, vomiting, diarrhea, dark stools, rectal bleeding, weight loss, or recent travel. received 2nd Moderna covid vaccine on May 15

## 2021-06-05 NOTE — ED PROVIDER NOTE - PROGRESS NOTE DETAILS
+ PNEUMONIA/EFFUSION TO LLL ON XRAY - SUSPICIOUS FOR EMPYEMA - WILL OBTAIN CT CHEST.. BROAD SPECTRUM AXBX GIVEN. VSS. ENDORSED TO MAR WILL SIGN OUT OF CT

## 2021-06-06 LAB
ALBUMIN SERPL ELPH-MCNC: 2.6 G/DL — LOW (ref 3.5–5.2)
ALP SERPL-CCNC: 198 U/L — HIGH (ref 30–115)
ALT FLD-CCNC: 28 U/L — SIGNIFICANT CHANGE UP (ref 0–41)
ANION GAP SERPL CALC-SCNC: 9 MMOL/L — SIGNIFICANT CHANGE UP (ref 7–14)
AST SERPL-CCNC: 18 U/L — SIGNIFICANT CHANGE UP (ref 0–41)
BASOPHILS # BLD AUTO: 0.08 K/UL — SIGNIFICANT CHANGE UP (ref 0–0.2)
BASOPHILS NFR BLD AUTO: 0.4 % — SIGNIFICANT CHANGE UP (ref 0–1)
BILIRUB SERPL-MCNC: <0.2 MG/DL — SIGNIFICANT CHANGE UP (ref 0.2–1.2)
BLD GP AB SCN SERPL QL: SIGNIFICANT CHANGE UP
BUN SERPL-MCNC: 11 MG/DL — SIGNIFICANT CHANGE UP (ref 10–20)
CALCIUM SERPL-MCNC: 8.6 MG/DL — SIGNIFICANT CHANGE UP (ref 8.5–10.1)
CHLORIDE SERPL-SCNC: 98 MMOL/L — SIGNIFICANT CHANGE UP (ref 98–110)
CO2 SERPL-SCNC: 29 MMOL/L — SIGNIFICANT CHANGE UP (ref 17–32)
CREAT SERPL-MCNC: 0.7 MG/DL — SIGNIFICANT CHANGE UP (ref 0.7–1.5)
EOSINOPHIL # BLD AUTO: 0.42 K/UL — SIGNIFICANT CHANGE UP (ref 0–0.7)
EOSINOPHIL NFR BLD AUTO: 2.1 % — SIGNIFICANT CHANGE UP (ref 0–8)
GLUCOSE SERPL-MCNC: 88 MG/DL — SIGNIFICANT CHANGE UP (ref 70–99)
HCT VFR BLD CALC: 23.2 % — LOW (ref 42–52)
HCT VFR BLD CALC: 27.1 % — LOW (ref 42–52)
HGB BLD-MCNC: 6.7 G/DL — CRITICAL LOW (ref 14–18)
HGB BLD-MCNC: 8 G/DL — LOW (ref 14–18)
HIV 1 & 2 AB SERPL IA.RAPID: SIGNIFICANT CHANGE UP
IMM GRANULOCYTES NFR BLD AUTO: 13.1 % — HIGH (ref 0.1–0.3)
IRON SATN MFR SERPL: 17 UG/DL — LOW (ref 35–150)
IRON SATN MFR SERPL: 8 % — LOW (ref 15–50)
LDH SERPL L TO P-CCNC: 291 U/L — HIGH (ref 50–242)
LYMPHOCYTES # BLD AUTO: 11 % — LOW (ref 20.5–51.1)
LYMPHOCYTES # BLD AUTO: 2.2 K/UL — SIGNIFICANT CHANGE UP (ref 1.2–3.4)
MAGNESIUM SERPL-MCNC: 1.6 MG/DL — LOW (ref 1.8–2.4)
MCHC RBC-ENTMCNC: 18.9 PG — LOW (ref 27–31)
MCHC RBC-ENTMCNC: 19.9 PG — LOW (ref 27–31)
MCHC RBC-ENTMCNC: 28.9 G/DL — LOW (ref 32–37)
MCHC RBC-ENTMCNC: 29.5 G/DL — LOW (ref 32–37)
MCV RBC AUTO: 65.4 FL — LOW (ref 80–94)
MCV RBC AUTO: 67.2 FL — LOW (ref 80–94)
MONOCYTES # BLD AUTO: 1.32 K/UL — HIGH (ref 0.1–0.6)
MONOCYTES NFR BLD AUTO: 6.6 % — SIGNIFICANT CHANGE UP (ref 1.7–9.3)
NEUTROPHILS # BLD AUTO: 13.35 K/UL — HIGH (ref 1.4–6.5)
NEUTROPHILS NFR BLD AUTO: 66.8 % — SIGNIFICANT CHANGE UP (ref 42.2–75.2)
NRBC # BLD: 0 /100 WBCS — SIGNIFICANT CHANGE UP (ref 0–0)
NRBC # BLD: 0 /100 WBCS — SIGNIFICANT CHANGE UP (ref 0–0)
PLATELET # BLD AUTO: 428 K/UL — HIGH (ref 130–400)
PLATELET # BLD AUTO: 481 K/UL — HIGH (ref 130–400)
POTASSIUM SERPL-MCNC: 3.9 MMOL/L — SIGNIFICANT CHANGE UP (ref 3.5–5)
POTASSIUM SERPL-SCNC: 3.9 MMOL/L — SIGNIFICANT CHANGE UP (ref 3.5–5)
PROT SERPL-MCNC: 5.3 G/DL — LOW (ref 6–8)
RBC # BLD: 3.55 M/UL — LOW (ref 4.7–6.1)
RBC # BLD: 4.03 M/UL — LOW (ref 4.7–6.1)
RBC # FLD: 26.6 % — HIGH (ref 11.5–14.5)
RBC # FLD: 27.5 % — HIGH (ref 11.5–14.5)
SODIUM SERPL-SCNC: 136 MMOL/L — SIGNIFICANT CHANGE UP (ref 135–146)
TIBC SERPL-MCNC: 201 UG/DL — LOW (ref 220–430)
UIBC SERPL-MCNC: 184 UG/DL — SIGNIFICANT CHANGE UP (ref 110–370)
WBC # BLD: 19.98 K/UL — HIGH (ref 4.8–10.8)
WBC # BLD: 24.59 K/UL — HIGH (ref 4.8–10.8)
WBC # FLD AUTO: 19.98 K/UL — HIGH (ref 4.8–10.8)
WBC # FLD AUTO: 24.59 K/UL — HIGH (ref 4.8–10.8)

## 2021-06-06 PROCEDURE — 99223 1ST HOSP IP/OBS HIGH 75: CPT

## 2021-06-06 PROCEDURE — 99233 SBSQ HOSP IP/OBS HIGH 50: CPT

## 2021-06-06 RX ORDER — METHADONE HYDROCHLORIDE 40 MG/1
30 TABLET ORAL DAILY
Refills: 0 | Status: DISCONTINUED | OUTPATIENT
Start: 2021-06-07 | End: 2021-06-09

## 2021-06-06 RX ORDER — METHADONE HYDROCHLORIDE 40 MG/1
10 TABLET ORAL ONCE
Refills: 0 | Status: DISCONTINUED | OUTPATIENT
Start: 2021-06-06 | End: 2021-06-06

## 2021-06-06 RX ORDER — METHADONE HYDROCHLORIDE 40 MG/1
20 TABLET ORAL ONCE
Refills: 0 | Status: DISCONTINUED | OUTPATIENT
Start: 2021-06-06 | End: 2021-06-06

## 2021-06-06 RX ORDER — MAGNESIUM SULFATE 500 MG/ML
2 VIAL (ML) INJECTION ONCE
Refills: 0 | Status: COMPLETED | OUTPATIENT
Start: 2021-06-06 | End: 2021-06-06

## 2021-06-06 RX ORDER — SODIUM CHLORIDE 9 MG/ML
1000 INJECTION, SOLUTION INTRAVENOUS
Refills: 0 | Status: DISCONTINUED | OUTPATIENT
Start: 2021-06-06 | End: 2021-06-06

## 2021-06-06 RX ADMIN — Medication 1 GRAM(S): at 06:59

## 2021-06-06 RX ADMIN — Medication 1 GRAM(S): at 11:31

## 2021-06-06 RX ADMIN — PANTOPRAZOLE SODIUM 40 MILLIGRAM(S): 20 TABLET, DELAYED RELEASE ORAL at 20:39

## 2021-06-06 RX ADMIN — AMPICILLIN SODIUM AND SULBACTAM SODIUM 200 GRAM(S): 250; 125 INJECTION, POWDER, FOR SUSPENSION INTRAMUSCULAR; INTRAVENOUS at 20:38

## 2021-06-06 RX ADMIN — SODIUM CHLORIDE 75 MILLILITER(S): 9 INJECTION, SOLUTION INTRAVENOUS at 14:09

## 2021-06-06 RX ADMIN — SODIUM CHLORIDE 75 MILLILITER(S): 9 INJECTION, SOLUTION INTRAVENOUS at 18:37

## 2021-06-06 RX ADMIN — METHADONE HYDROCHLORIDE 20 MILLIGRAM(S): 40 TABLET ORAL at 18:40

## 2021-06-06 RX ADMIN — Medication 16.67 GRAM(S): at 14:00

## 2021-06-06 RX ADMIN — PANTOPRAZOLE SODIUM 40 MILLIGRAM(S): 20 TABLET, DELAYED RELEASE ORAL at 06:59

## 2021-06-06 RX ADMIN — METHADONE HYDROCHLORIDE 10 MILLIGRAM(S): 40 TABLET ORAL at 12:53

## 2021-06-06 RX ADMIN — AMPICILLIN SODIUM AND SULBACTAM SODIUM 200 GRAM(S): 250; 125 INJECTION, POWDER, FOR SUSPENSION INTRAMUSCULAR; INTRAVENOUS at 02:39

## 2021-06-06 RX ADMIN — Medication 1 GRAM(S): at 20:40

## 2021-06-06 RX ADMIN — AMPICILLIN SODIUM AND SULBACTAM SODIUM 200 GRAM(S): 250; 125 INJECTION, POWDER, FOR SUSPENSION INTRAMUSCULAR; INTRAVENOUS at 11:32

## 2021-06-06 RX ADMIN — AMPICILLIN SODIUM AND SULBACTAM SODIUM 200 GRAM(S): 250; 125 INJECTION, POWDER, FOR SUSPENSION INTRAMUSCULAR; INTRAVENOUS at 06:59

## 2021-06-06 RX ADMIN — Medication 1 GRAM(S): at 02:39

## 2021-06-06 NOTE — CONSULT NOTE ADULT - ASSESSMENT
A - Pt with opioid use disorder, requesting to start methadone for maintenance treatment. Pt given 10mg of methadone thus far, by primary team.    Plan-    --give methadone 20mg tonight , x 1 dose, and then starting tomorrow Am - pt to be given methadone 30mg daily  --CATCH team and SW to follow up with pt for outpatient linkage to methadone program, etc.  --discussed with covering resident.

## 2021-06-06 NOTE — CONSULT NOTE ADULT - SUBJECTIVE AND OBJECTIVE BOX
PAVAN SOUZA  40y, Male  Allergy: No Known Allergies      CHIEF COMPLAINT: Pneumonia (05 Jun 2021 20:19)      LOS  1d    HPI:  40 M PMH opoid use, peptic ulcer disease (last 15 years, found to have bleeding ulcer with Dr. Alyssa Guzman GI on endoscopy 2 month ago, recent admission for anemia with endoscopy showing hill grade 4 ) presents with cough, dyspnea,and chest pain.Symptoms began Jun 2. Patient went to Urgent Care June 3rd,found to have multi-focal pneumonia. Was started on cefuroxime and doxycycline  at time. Patient accompanied by mother who is a nurse; she provided supplemental history. Upon interviewing patient in private, patient admitted to using oxycodone (oral/nasal ingestion) about 1 week ago. He threw up around the same time, and had "black vomitus". He also endorses having a melena episode last night.     Patient denies abdominal pain, diarrhea, constipation, hematochezia, hematuria, weight loss or abdominal pain,  In ED, VSS notable for tachycardia to 117. X-ray shows bilateral pna. ADAM negative.    (05 Jun 2021 20:19)      INFECTIOUS DISEASE HISTORY:    PAST MEDICAL & SURGICAL HISTORY:  Peptic ulcer disease    No significant past surgical history        FAMILY HISTORY      SOCIAL HISTORY  Social History:  Patient was incarcerated at [facility] and released approx 9 months ago.  Smokes occasionally, takes oxycodone (illicit), denies EtOH use. (05 Jun 2021 20:19)        ROS  General: Denies rigors, nightsweats  HEENT: Denies headache, rhinorrhea, sore throat, eye pain  CV: Denies CP, palpitations  PULM: Denies wheezing, hemoptysis  GI: Denies hematemesis, hematochezia, melena  : Denies discharge, hematuria  MSK: Denies arthralgias, myalgias  SKIN: Denies rash, lesions  NEURO: Denies paresthesias, weakness  PSYCH: Denies depression, anxiety    VITALS:  T(F): 99, Max: 100 (06-05-21 @ 19:52)  HR: 110  BP: 122/79  RR: 17Vital Signs Last 24 Hrs  T(C): 37.2 (06 Jun 2021 02:47), Max: 37.8 (05 Jun 2021 19:52)  T(F): 99 (06 Jun 2021 02:47), Max: 100 (05 Jun 2021 19:52)  HR: 110 (06 Jun 2021 02:47) (99 - 117)  BP: 122/79 (06 Jun 2021 02:47) (122/79 - 131/71)  BP(mean): --  RR: 17 (06 Jun 2021 02:47) (17 - 18)  SpO2: 95% (06 Jun 2021 02:47) (94% - 97%)    PHYSICAL EXAM:  Gen: NAD, resting in bed  HEENT: Normocephalic, atraumatic  Neck: supple, no lymphadenopathy  CV: Regular rate & regular rhythm  Lungs: decreased BS at bases, no fremitus  Abdomen: Soft, BS present  Ext: Warm, well perfused  Neuro: non focal, awake  Skin: no rash, no erythema  Lines: no phlebitis    TESTS & MEASUREMENTS:                        7.5    18.93 )-----------( 420      ( 05 Jun 2021 16:30 )             25.9     06-05    132<L>  |  97<L>  |  15  ----------------------------<  128<H>  3.6   |  22  |  1.0    Ca    8.5      05 Jun 2021 16:30    TPro  5.6<L>  /  Alb  3.1<L>  /  TBili  0.2  /  DBili  x   /  AST  25  /  ALT  37  /  AlkPhos  212<H>  06-05    eGFR if Non African American: 92 mL/min/1.73M2 (06-05-21 @ 16:30)  eGFR if : 107 mL/min/1.73M2 (06-05-21 @ 16:30)    LIVER FUNCTIONS - ( 05 Jun 2021 16:30 )  Alb: 3.1 g/dL / Pro: 5.6 g/dL / ALK PHOS: 212 U/L / ALT: 37 U/L / AST: 25 U/L / GGT: x                 Lactate, Blood: 1.5 mmol/L (06-05-21 @ 16:30)      INFECTIOUS DISEASES TESTING  COVID-19 PCR: NotDetec (06-05-21 @ 18:25)  COVID-19 PCR: NotDetec (05-20-21 @ 13:35)      RADIOLOGY & ADDITIONAL TESTS:  I have personally reviewed the last Chest xray  CXR      CT  CT Chest w/ IV Cont:   EXAM:  CT CHEST IC            PROCEDURE DATE:  06/05/2021            INTERPRETATION:  CLINICAL HISTORY / REASON FOR EXAM: Pneumonia.    TECHNIQUE: Multislice helical sections were obtained from the thoracic inlet to the lung bases with 95 cc Omnipaque 350 contrast administration. Coronal and sagittal images have been submitted for evaluation. Axial MIP images of the chest were obtained.    This CT study was performed using radiation dose reduction software that reduces mA or kVp according to the patient's size to obtain diagnostic image quality with patient exposure as low as reasonably achievable.    COMPARISON: No prior chest CT available.    FINDINGS:    LUNGS, PLEURA, AIRWAYS: Small to moderate left pleural effusion with mild loculated appearance and pleural thickening. Left upper and lower lobe consolidation.    Scattered centrilobular and tree-in-bud nodularity throughout both lungs.    No pneumothorax.    THORACIC NODES: Prominent mediastinal lymph nodes, the largest a subcarinal1.7 x 1.9 cm node. AP window 1.6 x 2.3 cm node. Prominent left hilar nodes.    MEDIASTINUM/GREAT VESSELS: No pericardial effusion. Heart size is within normal limits. Small hiatal hernia is noted. The aorta and main pulmonary artery are of normal caliber.    BONES/SOFT TISSUES: Unremarkable.    VISUALIZED UPPER ABDOMEN: Hepatomegaly partially visualized.    TUBES/LINES: None.    IMPRESSION:    Bilateral pneumonia, complicated on the left by pleural effusion with mild loculation and pleural thickening suspicious for empyema.              AYLIN VIZCAINO MD; Attending Radiologist  This document has been electronically signed. Jun 5 2021  9:01PM (06-05-21 @ 20:08)      CARDIOLOGY TESTING      MEDICATIONS  ampicillin/sulbactam  IVPB     ampicillin/sulbactam  IVPB 3 IV Intermittent every 6 hours  levoFLOXacin IVPB 750 IV Intermittent every 24 hours  pantoprazole  Injectable 40 IV Push two times a day  sucralfate 1 Oral every 6 hours      Weight  Weight (kg): 79.4 (06-05-21 @ 15:30)    ANTIBIOTICS:  ampicillin/sulbactam  IVPB      ampicillin/sulbactam  IVPB 3 Gram(s) IV Intermittent every 6 hours  levoFLOXacin IVPB 750 milliGRAM(s) IV Intermittent every 24 hours      ALLERGIES:  No Known Allergies       PAVAN SOUZA  40y, Male  Allergy: No Known Allergies      CHIEF COMPLAINT: Pneumonia (05 Jun 2021 20:19)      LOS  1d    HPI:  40 M PMH opoid use, peptic ulcer disease (last 15 years, [facility], recent admission for anemia with endoscopy showing hill grade 4 ) presents with cough, dyspnea,and chest pain.Symptoms began Jun 2. Patient went to Urgent Care June 3rd,found to have multi-focal pneumonia. Was started on cefuroxime and doxycycline  at time. Patient accompanied by mother who is a nurse; she provided supplemental history. Upon interviewing patient in private, patient admitted to using oxycodone (oral/nasal ingestion) about 1 week ago. He threw up around the same time, and had "black vomitus". He also endorses having a melena episode last night.     Patient denies abdominal pain, diarrhea, constipation, hematochezia, hematuria, weight loss or abdominal pain,  In ED, VSS notable for tachycardia to 117. X-ray shows bilateral pna. ADAM negative.    (05 Jun 2021 20:19)      INFECTIOUS DISEASE HISTORY:  Reports symptoms started about 5 days ago initially with fevers, followed by left sided chest pain/back pain.   Does endorse history of black vomitus and says that he may have aspirated.  Denies any recent travel.   Denies any weight loss, night sweats.   Denies any diarrhea.   No rashes or joint pains.   No ear aches, sinusitis, headaches     PAST MEDICAL & SURGICAL HISTORY:  Peptic ulcer disease    No significant past surgical history        FAMILY HISTORY  Family HX reviewed and non-contributory     SOCIAL HISTORY  Social History:  Patient was incarcerated at [facility] and released approx 9 months ago.  Smokes occasionally, takes oxycodone (illicit), denies EtOH use. (05 Jun 2021 20:19)        ROS  General: Denies rigors, nightsweats  HEENT: Denies headache, rhinorrhea, sore throat, eye pain  CV: Denies CP, palpitations  PULM: Denies wheezing, hemoptysis  GI: Denies hematemesis, hematochezia, melena  : Denies discharge, hematuria  MSK: Denies arthralgias, myalgias  SKIN: Denies rash, lesions  NEURO: Denies paresthesias, weakness  PSYCH: Denies depression, anxiety    VITALS:  T(F): 99, Max: 100 (06-05-21 @ 19:52)  HR: 110  BP: 122/79  RR: 17Vital Signs Last 24 Hrs  T(C): 37.2 (06 Jun 2021 02:47), Max: 37.8 (05 Jun 2021 19:52)  T(F): 99 (06 Jun 2021 02:47), Max: 100 (05 Jun 2021 19:52)  HR: 110 (06 Jun 2021 02:47) (99 - 117)  BP: 122/79 (06 Jun 2021 02:47) (122/79 - 131/71)  BP(mean): --  RR: 17 (06 Jun 2021 02:47) (17 - 18)  SpO2: 95% (06 Jun 2021 02:47) (94% - 97%)    PHYSICAL EXAM:  Gen: NAD, resting in bed  HEENT: Normocephalic, atraumatic  Neck: supple, no lymphadenopathy  CV: Regular rate & regular rhythm  Lungs: decreased BS at bases, no fremitus; decreased more on left bases  Abdomen: Soft, BS present  Ext: Warm, well perfused  Neuro: non focal, awake  Skin: no rash, no erythema  Lines: no phlebitis    TESTS & MEASUREMENTS:                        7.5    18.93 )-----------( 420      ( 05 Jun 2021 16:30 )             25.9     06-05    132<L>  |  97<L>  |  15  ----------------------------<  128<H>  3.6   |  22  |  1.0    Ca    8.5      05 Jun 2021 16:30    TPro  5.6<L>  /  Alb  3.1<L>  /  TBili  0.2  /  DBili  x   /  AST  25  /  ALT  37  /  AlkPhos  212<H>  06-05    eGFR if Non African American: 92 mL/min/1.73M2 (06-05-21 @ 16:30)  eGFR if : 107 mL/min/1.73M2 (06-05-21 @ 16:30)    LIVER FUNCTIONS - ( 05 Jun 2021 16:30 )  Alb: 3.1 g/dL / Pro: 5.6 g/dL / ALK PHOS: 212 U/L / ALT: 37 U/L / AST: 25 U/L / GGT: x                 Lactate, Blood: 1.5 mmol/L (06-05-21 @ 16:30)      INFECTIOUS DISEASES TESTING  COVID-19 PCR: NotDetec (06-05-21 @ 18:25)  COVID-19 PCR: NotDetec (05-20-21 @ 13:35)      RADIOLOGY & ADDITIONAL TESTS:  I have personally reviewed the last Chest xray  CXR      CT  CT Chest w/ IV Cont:   EXAM:  CT CHEST IC            PROCEDURE DATE:  06/05/2021            INTERPRETATION:  CLINICAL HISTORY / REASON FOR EXAM: Pneumonia.    TECHNIQUE: Multislice helical sections were obtained from the thoracic inlet to the lung bases with 95 cc Omnipaque 350 contrast administration. Coronal and sagittal images have been submitted for evaluation. Axial MIP images of the chest were obtained.    This CT study was performed using radiation dose reduction software that reduces mA or kVp according to the patient's size to obtain diagnostic image quality with patient exposure as low as reasonably achievable.    COMPARISON: No prior chest CT available.    FINDINGS:    LUNGS, PLEURA, AIRWAYS: Small to moderate left pleural effusion with mild loculated appearance and pleural thickening. Left upper and lower lobe consolidation.    Scattered centrilobular and tree-in-bud nodularity throughout both lungs.    No pneumothorax.    THORACIC NODES: Prominent mediastinal lymph nodes, the largest a subcarinal1.7 x 1.9 cm node. AP window 1.6 x 2.3 cm node. Prominent left hilar nodes.    MEDIASTINUM/GREAT VESSELS: No pericardial effusion. Heart size is within normal limits. Small hiatal hernia is noted. The aorta and main pulmonary artery are of normal caliber.    BONES/SOFT TISSUES: Unremarkable.    VISUALIZED UPPER ABDOMEN: Hepatomegaly partially visualized.    TUBES/LINES: None.    IMPRESSION:    Bilateral pneumonia, complicated on the left by pleural effusion with mild loculation and pleural thickening suspicious for empyema.              AYLIN VIZCAINO MD; Attending Radiologist  This document has been electronically signed. Jun 5 2021  9:01PM (06-05-21 @ 20:08)      CARDIOLOGY TESTING      MEDICATIONS  ampicillin/sulbactam  IVPB     ampicillin/sulbactam  IVPB 3 IV Intermittent every 6 hours  levoFLOXacin IVPB 750 IV Intermittent every 24 hours  pantoprazole  Injectable 40 IV Push two times a day  sucralfate 1 Oral every 6 hours      Weight  Weight (kg): 79.4 (06-05-21 @ 15:30)    ANTIBIOTICS:  ampicillin/sulbactam  IVPB      ampicillin/sulbactam  IVPB 3 Gram(s) IV Intermittent every 6 hours  levoFLOXacin IVPB 750 milliGRAM(s) IV Intermittent every 24 hours      ALLERGIES:  No Known Allergies

## 2021-06-06 NOTE — CONSULT NOTE ADULT - SUBJECTIVE AND OBJECTIVE BOX
39 yo male with history of opioid use disorder, admitted due to multifocal pneumonia from aspiration while under the influence of Roxicodone Pt reports use of approx 10 tabs daily (30mg tabs) for last several days; pt fell asleep, had vomited a dark brown color, his roommate called his mom, and he went to his doctor, however due to ongoing worsening of respiratory symptoms, he was referred to the ED today, how admitted for IV antibiotics. He was admitted medically at the end of May and at that time only wanted detox with methadone, and did not want to be on maintenance treatment. However today, pt reports motivation to stop using and wants to resume methadone maintenance treatment (has been in a similar program before, years ago, in [facility], was taking up to 60mg methadone/daily however tapered down and eventually stopped altogether). 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 He reports feeling guilty and embarrassed about recent use, reports many protective factors including his mother and his son , denies SI at this time. No other significant mood symptoms. Pt was given 10mg methadone already today by primary team.     MSE -   Vital Signs Last 24 Hrs  T(C): 36.2 (06 Jun 2021 15:08), Max: 37.8 (05 Jun 2021 19:52)  T(F): 97.2 (06 Jun 2021 15:08), Max: 100 (05 Jun 2021 19:52)  HR: 112 (06 Jun 2021 15:08) (99 - 117)  BP: 137/82 (06 Jun 2021 15:08) (119/72 - 142/77)  BP(mean): --  RR: 17 (06 Jun 2021 15:08) (16 - 18)  SpO2: 94% (06 Jun 2021 15:08) (94% - 97%)  White male casually dressed, fair grooming and hygiene, calm and cooperative, sad appearing at times; no diaphoresis or tremor; fair eye contact; speech- fluent, spontaneous, regular volume; mood 'okay, embarrassed', affect reactive yet constricted; thought process linear, thought content - no delusions elicited, denies SI/HI, no hallucinations; insight/judgment fair, fair impulse control. AAOx3.

## 2021-06-06 NOTE — CONSULT NOTE ADULT - SUBJECTIVE AND OBJECTIVE BOX
Gastroenterology Consultation:    Patient is a 40y old  Male who presents with a chief complaint of Pneumonia (06 Jun 2021 15:20)      Admitted on: 06-05-21  HPI:  40 M PMH opoid use, peptic ulcer disease, esophageal ulcer and esophagitis presents with cough, dyspnea, and chest pain. currenlty admitted for pneumonia with suspicion of empyema currently on IV antibiotics  GI are consulted as patient reported coffee ground vomiting 3 days ago and 1 episode of melena. patient with recent admission and EGD showing GE junction ulcer, esophagitis, Hiatal hernia. antral biopsy positive for H pylori, no Rx provided. patient has an appointment with dr Santa in July. denies NSAIDs, but recently received doxycycline     Prior records Reviewed (Y/N): Y  History obtained from person other than patient (Y/N): mother at bedside    Prior EGD: < from: EGD (05.21.21 @ 12:30) >   Grade B esophagitis in the lower third of the esophagus compatible with  nonspecific erosive esophagitis.    -A 2 x 2 cm clean based ulcer (Coosa class 3) was noted at the GEJ along the  gastric cardia. No blood was noted in the esophagus or stomach. This ulcer did  not require endoscopic intervention. .    Erythema in the stomach compatible with non-erosive gastritis.(Biopsy).    Normal mucosa in the whole examined duodenum.    -A large sliding hiatal hernia noted with Hill grade 4 GEJ.     < end of copied text >    Prior Colonoscopy: none on chart      PAST MEDICAL & SURGICAL HISTORY:  Peptic ulcer disease  No significant past surgical history    FAMILY HISTORY: sarcoidosis father      Social History:  Tobacco: N  Alcohol: N  Drugs: po opioids    Home Medications:    MEDICATIONS  (STANDING):  ampicillin/sulbactam  IVPB      ampicillin/sulbactam  IVPB 3 Gram(s) IV Intermittent every 6 hours  lactated ringers. 1000 milliLiter(s) (75 mL/Hr) IV Continuous <Continuous>  methadone    Tablet 20 milliGRAM(s) Oral once  pantoprazole  Injectable 40 milliGRAM(s) IV Push two times a day  sucralfate 1 Gram(s) Oral every 6 hours    MEDICATIONS  (PRN):      Allergies  No Known Allergies    Review of Systems:   Constitutional:  +Fever, +Chills  ENT/Mouth:  No Hearing Changes,  No Difficulty Swallowing  Eyes:  No Eye Pain, No Vision Changes  Cardiovascular:  No Chest Pain, No Palpitations  Respiratory:  +Cough, +Dyspnea  Gastrointestinal:  As described in HPI  Musculoskeletal:  No Joint Swelling, No Back Pain  Skin:  No Skin Lesions, No Jaundice  Neuro:  No Syncope, No Dizziness  Heme/Lymph:  No Bruising     Physical Examination:  T(C): 36.2 (06-06-21 @ 15:08), Max: 37.8 (06-05-21 @ 19:52)  HR: 112 (06-06-21 @ 15:08) (99 - 112)  BP: 137/82 (06-06-21 @ 15:08) (119/72 - 142/77)  RR: 17 (06-06-21 @ 15:08) (16 - 18)  SpO2: 94% (06-06-21 @ 15:08) (94% - 97%)    Constitutional: mildly tachypneic  Eyes:. Conjunctivae are clear, Sclera is non-icteric.  Ears Nose and Throat: The external ears are normal appearing,  Oral mucosa is pink and moist.  Respiratory:  mildly tachypneic, wheezing, LLQ  Cardiovascular:  S1 S2, Regular rate and rhythm.  GI: Abdomen is soft, symmetric, and without distention.  epigastric tenderness  Neuro: No Tremor, No involuntary movements  Skin: No rashes, No Jaundice.      Data: (reviewed by attending)                        8.0    24.59 )-----------( 481      ( 06 Jun 2021 11:27 )             27.1     Hgb Trend:  8.0  06-06-21 @ 11:27  6.7  06-06-21 @ 05:24  7.5  06-05-21 @ 16:30        06-06    136  |  98  |  11  ----------------------------<  88  3.9   |  29  |  0.7    Ca    8.6      06 Jun 2021 05:24  Mg     1.6     06-06    TPro  5.3<L>  /  Alb  2.6<L>  /  TBili  <0.2  /  DBili  x   /  AST  18  /  ALT  28  /  AlkPhos  198<H>  06-06    Liver panel trend:  TBili <0.2   /   AST 18   /   ALT 28   /   AlkP 198   /   Tptn 5.3   /   Alb 2.6    /   DBili --      06-06  TBili 0.2   /   AST 25   /   ALT 37   /   AlkP 212   /   Tptn 5.6   /   Alb 3.1    /   DBili --      06-05    Radiology:(reviewed by attending)  < from: CT Chest w/ IV Cont (06.05.21 @ 20:08) >  IMPRESSION:    Bilateral pneumonia, complicated on the left by pleural effusion with mild loculation and pleural thickening suspicious for empyema.    < end of copied text >

## 2021-06-06 NOTE — PROGRESS NOTE ADULT - SUBJECTIVE AND OBJECTIVE BOX
PAVAN SOUZA 40y Male  MRN#: [id number]   CODE STATUS: FULL      SUBJECTIVE  Patient is a 40y old Male who presents with a chief complaint of Pneumonia (06 Jun 2021 06:16)    Currently admitted to medicine with the primary diagnosis of Pneumonia    Today is hospital day 1d, and this morning he is laying in bed comfortably and reports no overnight events. Pt reports feeling generally well. Denies any further coffee ground emesis or melena/hematochezia. Requesting methadone to control opoid withdrawal, last used 2 days ago. Breathing comfortably, no CP or palpitations.     OBJECTIVE  PAST MEDICAL & SURGICAL HISTORY  Peptic ulcer disease    No significant past surgical history      ALLERGIES:  No Known Allergies    MEDICATIONS:  STANDING MEDICATIONS  ampicillin/sulbactam  IVPB      ampicillin/sulbactam  IVPB 3 Gram(s) IV Intermittent every 6 hours  magnesium sulfate  IVPB 2 Gram(s) IV Intermittent once  pantoprazole  Injectable 40 milliGRAM(s) IV Push two times a day  sucralfate 1 Gram(s) Oral every 6 hours    PRN MEDICATIONS      VITAL SIGNS: Last 24 Hours  T(C): 36.8 (06 Jun 2021 11:35), Max: 37.8 (05 Jun 2021 19:52)  T(F): 98.3 (06 Jun 2021 11:35), Max: 100 (05 Jun 2021 19:52)  HR: 106 (06 Jun 2021 11:35) (99 - 117)  BP: 142/77 (06 Jun 2021 11:35) (119/72 - 142/77)  BP(mean): --  RR: 16 (06 Jun 2021 11:35) (16 - 18)  SpO2: 96% (06 Jun 2021 11:35) (94% - 97%)    LABS:                        8.0    24.59 )-----------( 481      ( 06 Jun 2021 11:27 )             27.1     06-06    136  |  98  |  11  ----------------------------<  88  3.9   |  29  |  0.7    Ca    8.6      06 Jun 2021 05:24  Mg     1.6     06-06    TPro  5.3<L>  /  Alb  2.6<L>  /  TBili  <0.2  /  DBili  x   /  AST  18  /  ALT  28  /  AlkPhos  198<H>  06-06        Troponin T, Serum: <0.01 ng/mL (06-05-21 @ 16:30)  Lactate, Blood: 1.5 mmol/L (06-05-21 @ 16:30)      CARDIAC MARKERS ( 05 Jun 2021 16:30 )  x     / <0.01 ng/mL / x     / x     / x          RADIOLOGY:    < from: CT Chest w/ IV Cont (06.05.21 @ 20:08) >  IMPRESSION:    Bilateral pneumonia, complicated on the left by pleural effusion with mild loculation and pleural thickening suspicious for empyema.    < end of copied text >      < from: Xray Chest 2 Views PA/Lat (06.05.21 @ 16:07) >  Impression:    Patchy bilateral opacities and left pleural effusion.    < end of copied text >      PHYSICAL EXAM:    GENERAL: NAD, well-developed, AAOx3  HEENT:  Atraumatic, Normocephalic. EOMI, conjunctiva and sclera clear, No JVD  PULMONARY: scattered BL coarse expiratory rhonchi; No wheeze  CARDIOVASCULAR: Regular rate and rhythm; No murmurs, rubs, or gallops  GASTROINTESTINAL: Soft, Nontender, Nondistended; Bowel sounds present  MUSCULOSKELETAL:  2+ Peripheral Pulses, No clubbing, cyanosis, or edema  NEUROLOGY: non-focal  SKIN: No rashes or lesions      ASSESSMENT & PLAN    40 M PMH opoid use, peptic ulcer disease presenting for fever, chill, malaise. Outpatient dx with multifocal pneumonia. Used opiates 1 week ago, associated with coffee ground emesis episode at the time and a melena episode last night. In ED, found to be septic on admission (tachycardia 117, WBC 18k, source is pna), although hemodynamically stable and saturating well.       #Multifocal Pneumonia with Sepsis Present on Admission  -possible was related to aspiration event with opiate use as sx began shortly afterwards  -ID eval appreciated: stopped levaquin, will c/w Unasyn 3mg Q6   -MRSA pending; if positive, start vanc 1.75 mg x 1, followed by 1g q 8 hours  -HIV neg (was recently incarcerated)  -low suspicion for TB as per ID  -blood cultures x2 pending  -Legionella Ag and Strep Urine Ag pending  -pulm eval pending for possible empyema    #Coffee-Ground Emesis- 1 week ago   #Melena- occurred last night  #Peptic Ulcer Disease  #Acute on Chronic Microcytic Anemia- 9.4-7.5. ADAM negative   -Hb 8.0 s/p i unit PRBC  -NPO until GI eval  -on IV BID Protonix 40  -CBC Q12  -two large bore IV  -Active T+S  -gentle hydration 75 cc/hr   -GI eval  -LDH mildly elevated 291, haptoglobin pending (r/o hemolytic causes anemia especially in light of infection and high WBC)   -CT angio abdomen and STAT GI eval if worsening bleeding/HD instability      GI ppx- protonix IV BID  DVT ppx: SCD  DIET-NPO, gentle hydration 75cc/hr   Code Status: Full Code  dispo-acute. pending ID and GI eval    PAVAN SOUZA 40y Male  MRN#: [id number]   CODE STATUS: FULL      SUBJECTIVE  Patient is a 40y old Male who presents with a chief complaint of Pneumonia (06 Jun 2021 06:16)    Currently admitted to medicine with the primary diagnosis of Pneumonia    Today is hospital day 1d, and this morning he is laying in bed comfortably and reports no overnight events. Pt reports feeling generally well. Denies any further coffee ground emesis or melena/hematochezia. Requesting methadone to control opoid withdrawal, last used 2 days ago. Breathing comfortably, no CP or palpitations.     OBJECTIVE  PAST MEDICAL & SURGICAL HISTORY  Peptic ulcer disease    No significant past surgical history      ALLERGIES:  No Known Allergies    MEDICATIONS:  STANDING MEDICATIONS  ampicillin/sulbactam  IVPB      ampicillin/sulbactam  IVPB 3 Gram(s) IV Intermittent every 6 hours  magnesium sulfate  IVPB 2 Gram(s) IV Intermittent once  pantoprazole  Injectable 40 milliGRAM(s) IV Push two times a day  sucralfate 1 Gram(s) Oral every 6 hours    PRN MEDICATIONS      VITAL SIGNS: Last 24 Hours  T(C): 36.8 (06 Jun 2021 11:35), Max: 37.8 (05 Jun 2021 19:52)  T(F): 98.3 (06 Jun 2021 11:35), Max: 100 (05 Jun 2021 19:52)  HR: 106 (06 Jun 2021 11:35) (99 - 117)  BP: 142/77 (06 Jun 2021 11:35) (119/72 - 142/77)  BP(mean): --  RR: 16 (06 Jun 2021 11:35) (16 - 18)  SpO2: 96% (06 Jun 2021 11:35) (94% - 97%)    LABS:                        8.0    24.59 )-----------( 481      ( 06 Jun 2021 11:27 )             27.1     06-06    136  |  98  |  11  ----------------------------<  88  3.9   |  29  |  0.7    Ca    8.6      06 Jun 2021 05:24  Mg     1.6     06-06    TPro  5.3<L>  /  Alb  2.6<L>  /  TBili  <0.2  /  DBili  x   /  AST  18  /  ALT  28  /  AlkPhos  198<H>  06-06        Troponin T, Serum: <0.01 ng/mL (06-05-21 @ 16:30)  Lactate, Blood: 1.5 mmol/L (06-05-21 @ 16:30)      CARDIAC MARKERS ( 05 Jun 2021 16:30 )  x     / <0.01 ng/mL / x     / x     / x          RADIOLOGY:    < from: CT Chest w/ IV Cont (06.05.21 @ 20:08) >  IMPRESSION:    Bilateral pneumonia, complicated on the left by pleural effusion with mild loculation and pleural thickening suspicious for empyema.    < end of copied text >      < from: Xray Chest 2 Views PA/Lat (06.05.21 @ 16:07) >  Impression:    Patchy bilateral opacities and left pleural effusion.    < end of copied text >      PHYSICAL EXAM:    GENERAL: NAD, well-developed, AAOx3  HEENT:  Atraumatic, Normocephalic. EOMI, conjunctiva and sclera clear, No JVD  PULMONARY: scattered BL coarse expiratory rhonchi; No wheeze  CARDIOVASCULAR: Regular rate and rhythm; No murmurs, rubs, or gallops  GASTROINTESTINAL: Soft, Nontender, Nondistended; Bowel sounds present  MUSCULOSKELETAL:  2+ Peripheral Pulses, No clubbing, cyanosis, or edema  NEUROLOGY: non-focal  SKIN: No rashes or lesions      ASSESSMENT & PLAN    40 M PMH opoid use, peptic ulcer disease presenting for fever, chill, malaise. Outpatient dx with multifocal pneumonia. Used opiates 1 week ago, associated with coffee ground emesis episode at the time and a melena episode last night. In ED, found to be septic on admission (tachycardia 117, WBC 18k, source is pna), although hemodynamically stable and saturating well.       #Multifocal Pneumonia with Sepsis Present on Admission  -possible was related to aspiration event with opiate use as sx began shortly afterwards  -ID eval appreciated: stopped levaquin, will c/w Unasyn 3mg Q6   -MRSA pending; if positive, start vanc 1.75 mg x 1, followed by 1g q 8 hours  -HIV neg (was recently incarcerated)  -low suspicion for TB as per ID  -blood cultures x2 pending  -Legionella Ag and Strep Urine Ag pending  -pulm eval pending for possible empyema    #Coffee-Ground Emesis- 1 week ago   #Melena- occurred last night  #Peptic Ulcer Disease  #Acute on Chronic Microcytic Anemia- 9.4-7.5. ADAM negative   -Hb 8.0 s/p i unit PRBC  -NPO until GI eval  -on IV BID Protonix 40  -CBC Q12  -two large bore IV  -Active T+S  -gentle hydration 75 cc/hr   -GI eval  -LDH mildly elevated 291, haptoglobin pending (r/o hemolytic causes anemia especially in light of infection and high WBC)   -CT angio abdomen and STAT GI eval if worsening bleeding/HD instability    # Hx opoid abuse  - last used 2 days ago  - received 10mg methadone today  - f/u [facility] team eval      GI ppx- protonix IV BID  DVT ppx: SCD  DIET-NPO, gentle hydration 75cc/hr   Code Status: Full Code  dispo-acute. pending ID and GI eval

## 2021-06-06 NOTE — CONSULT NOTE ADULT - ASSESSMENT
40 M PMH opoid use, peptic ulcer disease, esophageal ulcer and esophagitis presents with cough, dyspnea, and chest pain. currenlty admitted for pneumonia with suspicion of empyema currently on IV antibiotics  GI are consulted as patient reported coffee ground vomiting 3 days ago and 1 episode of melena. patient with recent admission and EGD showing GE junction ulcer, esophagitis, Hiatal hernia. antral biopsy positive for H pylori, no Rx provided. patient has an appointment with dr Santa in July. denies NSAIDs, but recently received doxycycline    *Coffee ground vomiting  -2/2 esophagitis + esophageal ulcer, worsened by pill esophagitis (doxycyline)  -recent EGD in may  -HD stable, drop in Hb s/p blood transfusion  -H pylori positive    Rec:  -PPI BID IV  -2 large bore IV  -Trend CBC  -keep active type and screen  -can have clear liquid diet  -might benefit from EGD after pulmonary optimization    -for questions call [phone] during weekdays till 5pm and call GI service after 5pm and on weekends [phone]   40 M PMH opoid use, peptic ulcer disease, esophageal ulcer and esophagitis presents with cough, dyspnea, and chest pain. currenlty admitted for pneumonia with suspicion of empyema currently on IV antibiotics  GI are consulted as patient reported coffee ground vomiting 3 days ago and 1 episode of melena. patient with recent admission and EGD showing GE junction ulcer, esophagitis, Hiatal hernia. antral biopsy positive for H pylori, no Rx provided. patient has an appointment with dr Santa in July. denies NSAIDs, but recently received doxycycline    *Coffee ground vomiting  -2/2 esophagitis + esophageal ulcer, worsened by pill esophagitis (doxycyline)  -recent EGD in may  -HD stable, drop in Hb s/p blood transfusion  -H pylori positive    Rec:  -PPI BID IV  -2 large bore IV  -Trend CBC  -keep active type and screen  -can have clear liquid diet  -might benefit from EGD after pulmonary optimization  -needs OP Rx of H pylori and eradication    -for questions call [phone] during weekdays till 5pm and call GI service after 5pm and on weekends [phone]

## 2021-06-07 LAB
AMPHET UR-MCNC: NEGATIVE — SIGNIFICANT CHANGE UP
ANION GAP SERPL CALC-SCNC: 10 MMOL/L — SIGNIFICANT CHANGE UP (ref 7–14)
ANION GAP SERPL CALC-SCNC: 9 MMOL/L — SIGNIFICANT CHANGE UP (ref 7–14)
APTT BLD: 30.1 SEC — SIGNIFICANT CHANGE UP (ref 27–39.2)
B PERT IGG+IGM PNL SER: CLEAR — SIGNIFICANT CHANGE UP
BARBITURATES UR SCN-MCNC: NEGATIVE — SIGNIFICANT CHANGE UP
BASOPHILS # BLD AUTO: 0.06 K/UL — SIGNIFICANT CHANGE UP (ref 0–0.2)
BASOPHILS # BLD AUTO: 0.09 K/UL — SIGNIFICANT CHANGE UP (ref 0–0.2)
BASOPHILS NFR BLD AUTO: 0.3 % — SIGNIFICANT CHANGE UP (ref 0–1)
BASOPHILS NFR BLD AUTO: 0.4 % — SIGNIFICANT CHANGE UP (ref 0–1)
BENZODIAZ UR-MCNC: NEGATIVE — SIGNIFICANT CHANGE UP
BUN SERPL-MCNC: 8 MG/DL — LOW (ref 10–20)
BUN SERPL-MCNC: 9 MG/DL — LOW (ref 10–20)
CALCIUM SERPL-MCNC: 7.9 MG/DL — LOW (ref 8.5–10.1)
CALCIUM SERPL-MCNC: 8.1 MG/DL — LOW (ref 8.5–10.1)
CHLORIDE SERPL-SCNC: 98 MMOL/L — SIGNIFICANT CHANGE UP (ref 98–110)
CHLORIDE SERPL-SCNC: 98 MMOL/L — SIGNIFICANT CHANGE UP (ref 98–110)
CO2 SERPL-SCNC: 26 MMOL/L — SIGNIFICANT CHANGE UP (ref 17–32)
CO2 SERPL-SCNC: 27 MMOL/L — SIGNIFICANT CHANGE UP (ref 17–32)
COCAINE METAB.OTHER UR-MCNC: NEGATIVE — SIGNIFICANT CHANGE UP
COLOR FLD: SIGNIFICANT CHANGE UP
COVID-19 SPIKE DOMAIN AB INTERP: POSITIVE
COVID-19 SPIKE DOMAIN ANTIBODY RESULT: >250 U/ML — HIGH
CREAT SERPL-MCNC: 0.7 MG/DL — SIGNIFICANT CHANGE UP (ref 0.7–1.5)
CREAT SERPL-MCNC: 0.8 MG/DL — SIGNIFICANT CHANGE UP (ref 0.7–1.5)
EOSINOPHIL # BLD AUTO: 0.24 K/UL — SIGNIFICANT CHANGE UP (ref 0–0.7)
EOSINOPHIL # BLD AUTO: 0.28 K/UL — SIGNIFICANT CHANGE UP (ref 0–0.7)
EOSINOPHIL NFR BLD AUTO: 1.2 % — SIGNIFICANT CHANGE UP (ref 0–8)
EOSINOPHIL NFR BLD AUTO: 1.4 % — SIGNIFICANT CHANGE UP (ref 0–8)
FLUID INTAKE SUBSTANCE CLASS: SIGNIFICANT CHANGE UP
FLUID SEGMENTED GRANULOCYTES: 83 % — SIGNIFICANT CHANGE UP
GLUCOSE SERPL-MCNC: 101 MG/DL — HIGH (ref 70–99)
GLUCOSE SERPL-MCNC: 85 MG/DL — SIGNIFICANT CHANGE UP (ref 70–99)
GRAM STN FLD: SIGNIFICANT CHANGE UP
HAPTOGLOB SERPL-MCNC: 398 MG/DL — HIGH (ref 34–200)
HCT VFR BLD CALC: 24.5 % — LOW (ref 42–52)
HCT VFR BLD CALC: 24.6 % — LOW (ref 42–52)
HCT VFR BLD CALC: 25.3 % — LOW (ref 42–52)
HGB BLD-MCNC: 7.4 G/DL — LOW (ref 14–18)
HGB BLD-MCNC: 7.4 G/DL — LOW (ref 14–18)
HGB BLD-MCNC: 7.5 G/DL — LOW (ref 14–18)
IMM GRANULOCYTES NFR BLD AUTO: 13 % — HIGH (ref 0.1–0.3)
IMM GRANULOCYTES NFR BLD AUTO: 13.4 % — HIGH (ref 0.1–0.3)
INR BLD: 1.35 RATIO — HIGH (ref 0.65–1.3)
LDH SERPL L TO P-CCNC: 340 U/L — HIGH (ref 50–242)
LEGIONELLA AG UR QL: NEGATIVE — SIGNIFICANT CHANGE UP
LYMPHOCYTES # BLD AUTO: 10 % — LOW (ref 20.5–51.1)
LYMPHOCYTES # BLD AUTO: 13.9 % — LOW (ref 20.5–51.1)
LYMPHOCYTES # BLD AUTO: 2 K/UL — SIGNIFICANT CHANGE UP (ref 1.2–3.4)
LYMPHOCYTES # BLD AUTO: 2.89 K/UL — SIGNIFICANT CHANGE UP (ref 1.2–3.4)
LYMPHOCYTES # FLD: 3 — SIGNIFICANT CHANGE UP
MAGNESIUM SERPL-MCNC: 1.9 MG/DL — SIGNIFICANT CHANGE UP (ref 1.8–2.4)
MAGNESIUM SERPL-MCNC: 2 MG/DL — SIGNIFICANT CHANGE UP (ref 1.8–2.4)
MCHC RBC-ENTMCNC: 19.8 PG — LOW (ref 27–31)
MCHC RBC-ENTMCNC: 19.9 PG — LOW (ref 27–31)
MCHC RBC-ENTMCNC: 19.9 PG — LOW (ref 27–31)
MCHC RBC-ENTMCNC: 29.6 G/DL — LOW (ref 32–37)
MCHC RBC-ENTMCNC: 30.1 G/DL — LOW (ref 32–37)
MCHC RBC-ENTMCNC: 30.2 G/DL — LOW (ref 32–37)
MCV RBC AUTO: 66 FL — LOW (ref 80–94)
MCV RBC AUTO: 66.3 FL — LOW (ref 80–94)
MCV RBC AUTO: 66.8 FL — LOW (ref 80–94)
METHADONE UR-MCNC: POSITIVE
MONOCYTES # BLD AUTO: 1.05 K/UL — HIGH (ref 0.1–0.6)
MONOCYTES # BLD AUTO: 1.26 K/UL — HIGH (ref 0.1–0.6)
MONOCYTES NFR BLD AUTO: 5.2 % — SIGNIFICANT CHANGE UP (ref 1.7–9.3)
MONOCYTES NFR BLD AUTO: 6.1 % — SIGNIFICANT CHANGE UP (ref 1.7–9.3)
MONOS+MACROS # FLD: 14 % — SIGNIFICANT CHANGE UP
MRSA PCR RESULT.: NEGATIVE — SIGNIFICANT CHANGE UP
NEUTROPHILS # BLD AUTO: 13.51 K/UL — HIGH (ref 1.4–6.5)
NEUTROPHILS # BLD AUTO: 14.07 K/UL — HIGH (ref 1.4–6.5)
NEUTROPHILS NFR BLD AUTO: 65 % — SIGNIFICANT CHANGE UP (ref 42.2–75.2)
NEUTROPHILS NFR BLD AUTO: 70.1 % — SIGNIFICANT CHANGE UP (ref 42.2–75.2)
NRBC # BLD: 0 /100 WBCS — SIGNIFICANT CHANGE UP (ref 0–0)
OPIATES UR-MCNC: POSITIVE
PCP SPEC-MCNC: SIGNIFICANT CHANGE UP
PHOSPHATE SERPL-MCNC: 2.7 MG/DL — SIGNIFICANT CHANGE UP (ref 2.1–4.9)
PHOSPHATE SERPL-MCNC: 2.9 MG/DL — SIGNIFICANT CHANGE UP (ref 2.1–4.9)
PLATELET # BLD AUTO: 471 K/UL — HIGH (ref 130–400)
PLATELET # BLD AUTO: 475 K/UL — HIGH (ref 130–400)
PLATELET # BLD AUTO: 543 K/UL — HIGH (ref 130–400)
POTASSIUM SERPL-MCNC: 3.8 MMOL/L — SIGNIFICANT CHANGE UP (ref 3.5–5)
POTASSIUM SERPL-MCNC: 4 MMOL/L — SIGNIFICANT CHANGE UP (ref 3.5–5)
POTASSIUM SERPL-SCNC: 3.8 MMOL/L — SIGNIFICANT CHANGE UP (ref 3.5–5)
POTASSIUM SERPL-SCNC: 4 MMOL/L — SIGNIFICANT CHANGE UP (ref 3.5–5)
PROCALCITONIN SERPL-MCNC: 0.63 NG/ML — HIGH (ref 0.02–0.1)
PROPOXYPHENE QUALITATIVE URINE RESULT: NEGATIVE — SIGNIFICANT CHANGE UP
PROTHROM AB SERPL-ACNC: 15.5 SEC — HIGH (ref 9.95–12.87)
RBC # BLD: 3.71 M/UL — LOW (ref 4.7–6.1)
RBC # BLD: 3.71 M/UL — LOW (ref 4.7–6.1)
RBC # BLD: 3.79 M/UL — LOW (ref 4.7–6.1)
RBC # FLD: 26.4 % — HIGH (ref 11.5–14.5)
RBC # FLD: 26.7 % — HIGH (ref 11.5–14.5)
RBC # FLD: 27.2 % — HIGH (ref 11.5–14.5)
RCV VOL RI: 0 /UL — SIGNIFICANT CHANGE UP (ref 0–0)
SARS-COV-2 IGG+IGM SERPL QL IA: >250 U/ML — HIGH
SARS-COV-2 IGG+IGM SERPL QL IA: POSITIVE
SODIUM SERPL-SCNC: 133 MMOL/L — LOW (ref 135–146)
SODIUM SERPL-SCNC: 135 MMOL/L — SIGNIFICANT CHANGE UP (ref 135–146)
SPECIMEN SOURCE: SIGNIFICANT CHANGE UP
TOTAL NUCLEATED CELL COUNT, BODY FLUID: 417 /UL — SIGNIFICANT CHANGE UP
TUBE TYPE: SIGNIFICANT CHANGE UP
WBC # BLD: 20.07 K/UL — HIGH (ref 4.8–10.8)
WBC # BLD: 20.77 K/UL — HIGH (ref 4.8–10.8)
WBC # BLD: 24.39 K/UL — HIGH (ref 4.8–10.8)
WBC # FLD AUTO: 20.07 K/UL — HIGH (ref 4.8–10.8)
WBC # FLD AUTO: 20.77 K/UL — HIGH (ref 4.8–10.8)
WBC # FLD AUTO: 24.39 K/UL — HIGH (ref 4.8–10.8)

## 2021-06-07 PROCEDURE — 71045 X-RAY EXAM CHEST 1 VIEW: CPT | Mod: 26

## 2021-06-07 PROCEDURE — 99223 1ST HOSP IP/OBS HIGH 75: CPT | Mod: GC

## 2021-06-07 PROCEDURE — 99222 1ST HOSP IP/OBS MODERATE 55: CPT

## 2021-06-07 PROCEDURE — 99221 1ST HOSP IP/OBS SF/LOW 40: CPT | Mod: 57

## 2021-06-07 PROCEDURE — 71250 CT THORAX DX C-: CPT | Mod: 26

## 2021-06-07 RX ORDER — SODIUM CHLORIDE 9 MG/ML
1000 INJECTION INTRAMUSCULAR; INTRAVENOUS; SUBCUTANEOUS
Refills: 0 | Status: DISCONTINUED | OUTPATIENT
Start: 2021-06-07 | End: 2021-06-09

## 2021-06-07 RX ORDER — ENOXAPARIN SODIUM 100 MG/ML
40 INJECTION SUBCUTANEOUS DAILY
Refills: 0 | Status: DISCONTINUED | OUTPATIENT
Start: 2021-06-07 | End: 2021-06-09

## 2021-06-07 RX ORDER — ACETAMINOPHEN 500 MG
650 TABLET ORAL EVERY 6 HOURS
Refills: 0 | Status: DISCONTINUED | OUTPATIENT
Start: 2021-06-07 | End: 2021-06-09

## 2021-06-07 RX ADMIN — AMPICILLIN SODIUM AND SULBACTAM SODIUM 200 GRAM(S): 250; 125 INJECTION, POWDER, FOR SUSPENSION INTRAMUSCULAR; INTRAVENOUS at 00:19

## 2021-06-07 RX ADMIN — AMPICILLIN SODIUM AND SULBACTAM SODIUM 200 GRAM(S): 250; 125 INJECTION, POWDER, FOR SUSPENSION INTRAMUSCULAR; INTRAVENOUS at 05:42

## 2021-06-07 RX ADMIN — ENOXAPARIN SODIUM 40 MILLIGRAM(S): 100 INJECTION SUBCUTANEOUS at 17:19

## 2021-06-07 RX ADMIN — Medication 1 GRAM(S): at 11:18

## 2021-06-07 RX ADMIN — Medication 1 GRAM(S): at 17:18

## 2021-06-07 RX ADMIN — Medication 650 MILLIGRAM(S): at 11:17

## 2021-06-07 RX ADMIN — PANTOPRAZOLE SODIUM 40 MILLIGRAM(S): 20 TABLET, DELAYED RELEASE ORAL at 17:18

## 2021-06-07 RX ADMIN — Medication 650 MILLIGRAM(S): at 12:00

## 2021-06-07 RX ADMIN — Medication 1 GRAM(S): at 23:04

## 2021-06-07 RX ADMIN — Medication 1 GRAM(S): at 00:19

## 2021-06-07 RX ADMIN — PANTOPRAZOLE SODIUM 40 MILLIGRAM(S): 20 TABLET, DELAYED RELEASE ORAL at 05:42

## 2021-06-07 RX ADMIN — SODIUM CHLORIDE 100 MILLILITER(S): 9 INJECTION INTRAMUSCULAR; INTRAVENOUS; SUBCUTANEOUS at 15:26

## 2021-06-07 RX ADMIN — METHADONE HYDROCHLORIDE 30 MILLIGRAM(S): 40 TABLET ORAL at 11:19

## 2021-06-07 RX ADMIN — AMPICILLIN SODIUM AND SULBACTAM SODIUM 200 GRAM(S): 250; 125 INJECTION, POWDER, FOR SUSPENSION INTRAMUSCULAR; INTRAVENOUS at 23:04

## 2021-06-07 RX ADMIN — AMPICILLIN SODIUM AND SULBACTAM SODIUM 200 GRAM(S): 250; 125 INJECTION, POWDER, FOR SUSPENSION INTRAMUSCULAR; INTRAVENOUS at 11:18

## 2021-06-07 RX ADMIN — Medication 1 GRAM(S): at 05:42

## 2021-06-07 RX ADMIN — AMPICILLIN SODIUM AND SULBACTAM SODIUM 200 GRAM(S): 250; 125 INJECTION, POWDER, FOR SUSPENSION INTRAMUSCULAR; INTRAVENOUS at 17:18

## 2021-06-07 NOTE — PROGRESS NOTE ADULT - ASSESSMENT
40 M PMH opoid use, peptic ulcer disease presenting for fever, chill, malaise. Outpatient dx with multifocal pneumonia. Used opiates 1 week ago, associated with coffee ground emesis episode at the time and a melena episode last night. In ED, found to be septic on admission (tachycardia 117, WBC 18k, source is pna), although hemodynamically stable and saturating well.       # Multifocal Pneumonia with Sepsis Present on Admission  - possible was related to aspiration event with opiate use as sx began shortly afterwards  - ID eval appreciated:l c/w Unasyn 3mg Q6   - MRSA pending; if positive, start vanc 1.75 mg x 1, followed by 1g q 8 hours  - HIV neg (was recently incarcerated)  - low suspicion for TB as per ID - can d/c airborne precaution  - blood cultures x2 pending  - Legionella Ag and Strep Urine Ag pending  - pulm eval pending for possible empyema    # Coffee-Ground Emesis- 1 week ago   # Melena  # H/o Peptic Ulcer Disease  # Acute on Chronic Microcytic Anemia- 9.4-7.5. - - ADAM negative   - Hb 8.0 s/p i unit PRBC  - NPO until GI eval  - on IV BID Protonix 40  - CBC Q12  - two large bore IV  - Active T+S  - gentle hydration 75 cc/hr   - GI eval - continue with PPI BID, Clear liq diet, EGD after pulmonary optimization, OP Tx of H. pylori  - LDH mildly elevated 291, haptoglobin pending (r/o hemolytic causes anemia especially in light of infection and high WBC)   -CT angio abdomen and STAT GI eval if worsening bleeding/HD instability    # Hx opoid abuse  - last used 2 days ago  - received 10mg methadone today  - Addiction medicine eval done   - continue with methadone 30mg daily  - outpatient methadone clinic Follow up at [facility]      GI ppx- protonix IV BID  DVT ppx: SCD  DIET-NPO, gentle hydration 75cc/hr   Code Status: Full Code  dispo: Acute 40 M PMH opoid use, peptic ulcer disease presenting for fever, chill, malaise. Outpatient dx with multifocal pneumonia. Used opiates 1 week ago, associated with coffee ground emesis episode at the time and a melena episode last night. In ED, found to be septic on admission (tachycardia 117, WBC 18k, source is pna), although hemodynamically stable and saturating well.     # Left sided loculated pleural effusion d/t Pneumonia with Sepsis Present on Admission  - possibly was related to aspiration event with opiate use as sx began shortly afterwards  - ID eval appreciated:l c/w Unasyn 3mg Q6   - MRSA- negative  - HIV neg (was recently incarcerated)  - low suspicion for TB as per ID - can d/c airborne precaution  - blood cultures x2(06/05) - NGTD  - Legionella Ag and Strep Urine Ag pending  - pulm eval - Thoracentesis done: pH 7.18;  - CXR (06/07) - worsening pleural effusion on the left side.   - CT surgery eval  - add on for VATS with possible decortication/open pleurodesis tomorrow  - Follow up preop labs (ordered by CT surgery); repeat TTE  - repeat CT chest    # Coffee-Ground Emesis- 1 week ago   # Melena  # H/o Peptic Ulcer Disease  # Acute on Chronic Microcytic Anemia  - No vomiting since admission  - Hb on presentation 6.7  - Hb 8.0 s/p i unit PRBC  - on IV BID Protonix 40  - CBC Q12  - two large bore IV  - tolerating soft diet  - Active T+S  - gentle hydration 75 cc/hr   - GI eval done- continue with PPI BID, EGD after pulmonary optimization, OP Tx of H. pylori  - LDH mildly elevated 291, haptoglobin - 398; hemolysis unlikely  - CT angio abdomen and STAT GI eval if worsening bleeding/HD instability    # Hx opoid abuse  - last used 2 days ago  - Addiction medicine eval done   - continue with methadone 30mg daily  - outpatient methadone clinic Follow up at [facility]      GI ppx- protonix IV BID  DVT ppx: SCD  DIET- Soft diet, gentle hydration 75cc/hr   Code Status: Full Code  dispo: Acute

## 2021-06-07 NOTE — CONSULT NOTE ADULT - ASSESSMENT
Impression  Possible Aspiration  Pre-op eval for EGD  Left pleural effusion, likely parapneumonic  Community acquired multifocal pneumonia  HO illicit drug abuse    Recommendations  Diagnostic and therapeutic thoracentesis  Continue with unasyn  Procal  Follow up cultures  Urine strep/ legionella  Replete Mg  HOB at 45  Aspiration precautions  DVT ppx Impression    l side pneumonia/ complicated parapenumonic effusion/ empyema/ worsening CXR  HO illicit drug abuse    Recommendations    Repeat chest ct  CT sx eval ( spoke with ct surgeon)  Procal  Cefepime/ flagyL  swab miguelina for MRSA  Follow up cultures  Urine strep/ legionella  Replete Mg  HOB at 45  Aspiration precautions  DVT ppx

## 2021-06-07 NOTE — CONSULT NOTE ADULT - SUBJECTIVE AND OBJECTIVE BOX
41 yo male with history of opioid use disorder, admitted due to multifocal pneumonia from aspiration while under the influence of Roxicodone. Addiction medicine consult placed for opioid use disorder. Pt has been receiving Methadone 30mg qd Per resident, no detox recommendations necessary at this time, patient's symptoms are currently well-controlled, though patient does require referral for services.  [facility] team social workers aware, will follow patient.

## 2021-06-07 NOTE — PROGRESS NOTE ADULT - SUBJECTIVE AND OBJECTIVE BOX
Patient was seen and examined; ambulating in room. A bit anxious but no dyspnea or other complaints  Spoke with RN.   Chart reviewed.  No events overnight- no fever or obvious bleeding  Vital Signs Last 24 Hrs  T(F): 99.1 (07 Jun 2021 05:39), Max: 99.9 (06 Jun 2021 22:42)  HR: 94 (07 Jun 2021 05:39) (94 - 118)  BP: 116/64 (07 Jun 2021 05:39) (116/64 - 142/77)  SpO2: 96% (06 Jun 2021 22:37) (94% - 96%)  MEDICATIONS  (STANDING):  ampicillin/sulbactam  IVPB      ampicillin/sulbactam  IVPB 3 Gram(s) IV Intermittent every 6 hours  methadone    Tablet 30 milliGRAM(s) Oral daily  pantoprazole  Injectable 40 milliGRAM(s) IV Push two times a day  sucralfate 1 Gram(s) Oral every 6 hours    MEDICATIONS  (PRN):    Labs:                        7.4    24.39 )-----------( 475      ( 07 Jun 2021 00:47 )             24.6                         8.0    24.59 )-----------( 481      ( 06 Jun 2021 11:27 )             27.1     06 Jun 2021 05:24    136    |  98     |  11     ----------------------------<  88     3.9     |  29     |  0.7    05 Jun 2021 16:30    132    |  97     |  15     ----------------------------<  128    3.6     |  22     |  1.0      Ca    8.6        06 Jun 2021 05:24  Ca    8.5        05 Jun 2021 16:30  Mg     1.6       06 Jun 2021 05:24    TPro  5.3    /  Alb  2.6    /  TBili  <0.2   /  DBili  x      /  AST  18     /  ALT  28     /  AlkPhos  198    06 Jun 2021 05:24  TPro  5.6    /  Alb  3.1    /  TBili  0.2    /  DBili  x      /  AST  25     /  ALT  37     /  AlkPhos  212    05 Jun 2021 16:30          Culture - Blood (collected 05 Jun 2021 16:30)  Source: .Blood Blood  Preliminary Report (07 Jun 2021 01:01):    No growth to date.    Culture - Blood (collected 05 Jun 2021 16:30)  Source: .Blood Blood  Preliminary Report (07 Jun 2021 01:01):    No growth to date.      General: anxious  Neurology: A&Ox3, nonfocal        A/P:  39 yo unfortunate man with active opioid use, peptic ulcer disease with recent admission for GIB;  now admitted with sepsis due to multifocal pneumonia. likely related to aspiration after drug use    Sepsis/PNA:    IV abx     IV fluids    ID f/u     pulmonary eval today    monitor cultures and WBC trend (HIV negative and low suspicion for Tb)         GI blood loss anemia:    IV PPI    keep active type and screen    GI f/u this week for possible EGD        Opioid addiction:    Addiction medicine f/u    started on  methadone 30mg daily    will need arrangements with outpatient methadone clinic prior to DC            close monitoring    ICU eval if any change in status    DVT prophylaxis  Decubitus prevention- all measures as per RN protocol  Please call or text me with any questions or updates

## 2021-06-07 NOTE — CONSULT NOTE ADULT - SUBJECTIVE AND OBJECTIVE BOX
GENERAL SURGERY CONSULT NOTE    Patient: PAVAN SOUZA , 40y ([birth year])Male   MRN: [id number]  Location: [facility] T4-3B 026 B  Visit: 06-05-21 Inpatient  Date: 06-07-21 @ 14:13    HPI:  40 M PMH opoid use, peptic ulcer disease (last 15 years, found to have bleeding ulcer with Dr. Shah [facility] on endoscopy 2 month ago, recent admission for anemia with endoscopy showing hill grade 4 ) presents with cough, dyspnea,and chest pain.Symptoms began Jun 2. Patient went to Urgent Care June 3rd,found to have multi-focal pneumonia. Was started on cefuroxime and doxycycline  at time. Patient accompanied by mother who is a nurse; she provided supplemental history. Upon interviewing patient in private, patient admitted to using oxycodone (oral/nasal ingestion) about 1 week ago. He threw up around the same time, and had "black vomitus". He also endorses having a melena episode last night.     Patient denies abdominal pain, diarrhea, constipation, hematochezia, hematuria, weight loss or abdominal pain,  In ED, VSS notable for tachycardia to 117. X-ray shows bilateral pna. ADAM negative.    (05 Jun 2021 20:19)        PAST MEDICAL & SURGICAL HISTORY:  Peptic ulcer disease    VITALS:  T(F): 100.5 (06-07-21 @ 10:25), Max: 100.5 (06-07-21 @ 10:25)  HR: 104 (06-07-21 @ 10:25) (94 - 118)  BP: 133/84 (06-07-21 @ 10:25) (116/64 - 140/86)  RR: 20 (06-07-21 @ 10:25) (17 - 20)  SpO2: 100% (06-07-21 @ 10:25) (91% - 100%)    PHYSICAL EXAM:  General: NAD, AAOx3, calm and cooperative  HEENT: NCAT, ALIS, EOMI, Trachea ML, Neck supple  Cardiac: RRR S1, S2, no Murmurs, rubs or gallops  Respiratory: CTAB, normal respiratory effort, breath sounds equal BL, no wheeze, rhonchi or crackles  Abdomen: Soft, non-distended, non-tender, no rebound, no guarding. +BS.  Rectal: Good tone, +stool, no blood, no marialuisa-anal masses/lesions, no fistulas, fissures, hemorrhoids  Musculoskeletal: Strength 5/5 BL UE/LE, ROM intact, compartments soft  Neuro: Sensation grossly intact and equal throughout, no focal deficits  Vascular: Pulses 2+ throughout, extremities well perfused  Skin: Warm/dry, normal color, no jaundice  Incision/wound: healing well, dressings in place, clean, dry and intact    MEDICATIONS  (STANDING):  ampicillin/sulbactam  IVPB 3 Gram(s) IV Intermittent every 6 hours  ampicillin/sulbactam  IVPB      enoxaparin Injectable 40 milliGRAM(s) SubCutaneous daily  methadone    Tablet 30 milliGRAM(s) Oral daily  pantoprazole  Injectable 40 milliGRAM(s) IV Push two times a day  sucralfate 1 Gram(s) Oral every 6 hours    MEDICATIONS  (PRN):  acetaminophen   Tablet .. 650 milliGRAM(s) Oral every 6 hours PRN Temp greater or equal to 38C (100.4F), Mild Pain (1 - 3), Moderate Pain (4 - 6)    LAB/STUDIES:                     7.4    20.07 )-----------( 471      ( 07 Jun 2021 07:04 )             24.5   06-07    133<L>  |  98  |  8<L>  ----------------------------<  101<H>  3.8   |  26  |  0.8    Ca    8.1<L>      07 Jun 2021 07:04  Phos  2.9     06-07  Mg     2.0     06-07    TPro  5.3<L>  /  Alb  2.6<L>  /  TBili  <0.2  /  DBili  x   /  AST  18  /  ALT  28  /  AlkPhos  198<H>  06-06    LIVER FUNCTIONS - ( 06 Jun 2021 05:24 )  Alb: 2.6 g/dL / Pro: 5.3 g/dL / ALK PHOS: 198 U/L / ALT: 28 U/L / AST: 18 U/L / GGT: x           CARDIAC MARKERS ( 05 Jun 2021 16:30 )  x     / <0.01 ng/mL / x     / x     / x        Culture - Blood (collected 05 Jun 2021 16:30)  Source: .Blood Blood  Preliminary Report (07 Jun 2021 01:01):    No growth to date.    Culture - Blood (collected 05 Jun 2021 16:30)  Source: .Blood Blood  Preliminary Report (07 Jun 2021 01:01):    No growth to date.      IMAGING:    < from: CT Chest w/ IV Cont (06.05.21 @ 20:08) >    FINDINGS:    LUNGS, PLEURA, AIRWAYS: Small to moderate left pleural effusion with mild loculated appearance and pleural thickening. Left upper and lower lobe consolidation.    Scattered centrilobular and tree-in-bud nodularity throughout both lungs.    No pneumothorax.    THORACIC NODES: Prominent mediastinal lymph nodes, the largest a subcarinal1.7 x 1.9 cm node. AP window 1.6 x 2.3 cm node. Prominent left hilar nodes.    MEDIASTINUM/GREAT VESSELS: No pericardial effusion. Heart size is within normal limits. Small hiatal hernia is noted. The aorta and main pulmonary artery are of normal caliber.    VISUALIZED UPPER ABDOMEN: Hepatomegaly partially visualized.    IMPRESSION:  Bilateral pneumonia, complicated on the left by pleural effusion with mild loculation and pleural thickening suspicious for empyema.  < end of copied text >    ACCESS DEVICES:  [ ] Peripheral IV  [ ] Central Venous Line	[ ] R	[ ] L	[ ] IJ	[ ] Fem	[ ] SC	Placed:   [ ] Arterial Line		[ ] R	[ ] L	[ ] Fem	[ ] Rad	[ ] Ax	Placed:   [ ] PICC:					[ ] Mediport  [ ] Urinary Catheter, Date Placed:  GENERAL SURGERY CONSULT NOTE    Patient: PAVAN SOUZA , 40y ([birth year])Male   MRN: [id number]  Location: [facility] T4-3B 026 B  Visit: 06-05-21 Inpatient  Date: 06-07-21 @ 14:13    HPI:  40 M PMH opoid use, peptic ulcer disease (last 15 years, found to have bleeding ulcer with Dr. Shah [facility] on endoscopy 2 month ago, recent admission for anemia with endoscopy showing hill grade 4 ) presents with cough, dyspnea,and chest pain.Symptoms began Jun 2. Patient went to Urgent Care June 3rd,found to have multi-focal pneumonia. Was started on cefuroxime and doxycycline  at time. Patient accompanied by mother who is a nurse; she provided supplemental history. Upon interviewing patient in private, patient admitted to using oxycodone (oral/nasal ingestion) about 1 week ago. He threw up around the same time, and had "black vomitus". He also endorses having a melena episode last night.     Patient denies abdominal pain, diarrhea, constipation, hematochezia, hematuria, weight loss or abdominal pain,  In ED, VSS notable for tachycardia to 117. X-ray shows bilateral pna. ADAM negative.    (05 Jun 2021 20:19)    Thoracic surgery consulted for possible VATS with decortication.    PAST MEDICAL & SURGICAL HISTORY:  Peptic ulcer disease    VITALS:  T(F): 100.5 (06-07-21 @ 10:25), Max: 100.5 (06-07-21 @ 10:25)  HR: 104 (06-07-21 @ 10:25) (94 - 118)  BP: 133/84 (06-07-21 @ 10:25) (116/64 - 140/86)  RR: 20 (06-07-21 @ 10:25) (17 - 20)  SpO2: 100% (06-07-21 @ 10:25) (91% - 100%)    PHYSICAL EXAM:  General: NAD, AAOx3, calm and cooperative  HEENT: NCAT, ALIS, EOMI, Trachea ML, Neck supple  Cardiac: RRR S1, S2, no Murmurs, rubs or gallops  Respiratory: CTAB, normal respiratory effort, breath sounds equal BL, no wheeze, rhonchi or crackles  Abdomen: Soft, non-distended, non-tender, no rebound, no guarding. +BS.  Rectal: Good tone, +stool, no blood, no marialuisa-anal masses/lesions, no fistulas, fissures, hemorrhoids  Musculoskeletal: Strength 5/5 BL UE/LE, ROM intact, compartments soft  Neuro: Sensation grossly intact and equal throughout, no focal deficits  Vascular: Pulses 2+ throughout, extremities well perfused  Skin: Warm/dry, normal color, no jaundice  Incision/wound: healing well, dressings in place, clean, dry and intact    MEDICATIONS  (STANDING):  ampicillin/sulbactam  IVPB 3 Gram(s) IV Intermittent every 6 hours  ampicillin/sulbactam  IVPB      enoxaparin Injectable 40 milliGRAM(s) SubCutaneous daily  methadone    Tablet 30 milliGRAM(s) Oral daily  pantoprazole  Injectable 40 milliGRAM(s) IV Push two times a day  sucralfate 1 Gram(s) Oral every 6 hours    MEDICATIONS  (PRN):  acetaminophen   Tablet .. 650 milliGRAM(s) Oral every 6 hours PRN Temp greater or equal to 38C (100.4F), Mild Pain (1 - 3), Moderate Pain (4 - 6)    LAB/STUDIES:                     7.4    20.07 )-----------( 471      ( 07 Jun 2021 07:04 )             24.5   06-07    133<L>  |  98  |  8<L>  ----------------------------<  101<H>  3.8   |  26  |  0.8    Ca    8.1<L>      07 Jun 2021 07:04  Phos  2.9     06-07  Mg     2.0     06-07    TPro  5.3<L>  /  Alb  2.6<L>  /  TBili  <0.2  /  DBili  x   /  AST  18  /  ALT  28  /  AlkPhos  198<H>  06-06    LIVER FUNCTIONS - ( 06 Jun 2021 05:24 )  Alb: 2.6 g/dL / Pro: 5.3 g/dL / ALK PHOS: 198 U/L / ALT: 28 U/L / AST: 18 U/L / GGT: x           CARDIAC MARKERS ( 05 Jun 2021 16:30 )  x     / <0.01 ng/mL / x     / x     / x        Culture - Blood (collected 05 Jun 2021 16:30)  Source: .Blood Blood  Preliminary Report (07 Jun 2021 01:01):    No growth to date.    Culture - Blood (collected 05 Jun 2021 16:30)  Source: .Blood Blood  Preliminary Report (07 Jun 2021 01:01):    No growth to date.      IMAGING:    < from: CT Chest w/ IV Cont (06.05.21 @ 20:08) >    FINDINGS:    LUNGS, PLEURA, AIRWAYS: Small to moderate left pleural effusion with mild loculated appearance and pleural thickening. Left upper and lower lobe consolidation.    Scattered centrilobular and tree-in-bud nodularity throughout both lungs.    No pneumothorax.    THORACIC NODES: Prominent mediastinal lymph nodes, the largest a subcarinal1.7 x 1.9 cm node. AP window 1.6 x 2.3 cm node. Prominent left hilar nodes.    MEDIASTINUM/GREAT VESSELS: No pericardial effusion. Heart size is within normal limits. Small hiatal hernia is noted. The aorta and main pulmonary artery are of normal caliber.    VISUALIZED UPPER ABDOMEN: Hepatomegaly partially visualized.    IMPRESSION:  Bilateral pneumonia, complicated on the left by pleural effusion with mild loculation and pleural thickening suspicious for empyema.  < end of copied text >    ACCESS DEVICES:  [ ] Peripheral IV  [ ] Central Venous Line	[ ] R	[ ] L	[ ] IJ	[ ] Fem	[ ] SC	Placed:   [ ] Arterial Line		[ ] R	[ ] L	[ ] Fem	[ ] Rad	[ ] Ax	Placed:   [ ] PICC:					[ ] Mediport  [ ] Urinary Catheter, Date Placed:

## 2021-06-07 NOTE — PROGRESS NOTE ADULT - SUBJECTIVE AND OBJECTIVE BOX
Interval HPI: 41 yo male with history of opioid use disorder, admitted due to multifocal pneumonia from aspiration while under the influence of Roxicodone. Staff report the pt's opioid withdrawal symptoms are well managed on methadone 30mg qd. Pt interviewed in his room. Pt reports that he feels "better" described as improving in terms of his pneumonia and no longer having opioid withdrawal symptoms. He reports Methadone 30mg is an effective dose to manage his symptoms, and would like to continue his maintenance therapy outpatient. Pt denies any current opioid withdrawal symptoms. Pt currently has a COWS score of 0. Pt denies any suicidal ideation, homicidal ideation, auditory hallucinations, or visual hallucinations.                           7.4    20.07 )-----------( 471      ( 07 Jun 2021 07:04 )             24.5     06-07    133<L>  |  98  |  8<L>  ----------------------------<  101<H>  3.8   |  26  |  0.8    Ca    8.1<L>      07 Jun 2021 07:04  Phos  2.9     06-07  Mg     2.0     06-07    TPro  5.3<L>  /  Alb  2.6<L>  /  TBili  <0.2  /  DBili  x   /  AST  18  /  ALT  28  /  AlkPhos  198<H>  06-06    < from: 12 Lead ECG (06.05.21 @ 15:34) >    Ventricular Rate 108 BPM    Atrial Rate 108 BPM    P-R Interval 120 ms    QRS Duration 94 ms    Q-T Interval 494 ms    QTC Calculation(Bazett) 661 ms    P Axis 28 degrees    R Axis 53 degrees    T Axis 41 degrees    Diagnosis Line Sinus tachycardia  ST & T wave abnormality, consider inferolateral ischemia  Prolonged QT  Abnormal ECG      < end of copied text >    MSE:  White male casually dressed, fair grooming and hygiene, calm and cooperative, euthymic; no diaphoresis or tremor; fair eye contact; speech- fluent, spontaneous, regular volume; mood 'okay", affect congruent; thought process linear, thought content - no delusions elicited, denies SI/HI, no hallucinations; insight/judgment fair, fair impulse control. AAOx3.       Differential: Opioid dependence, opioid withdrawal

## 2021-06-07 NOTE — PROGRESS NOTE ADULT - ASSESSMENT
39 yo male with history of opioid use disorder, admitted due to multifocal pneumonia from aspiration while under the influence of Roxicodone, with opioid use disorder, requesting to start methadone for maintenance treatment. Pt given 30mg of methadone daily thus far, by primary team. Pt reports resolution of withdrawal symptoms at this dose.     Recommendations    --continue methadone 30mg daily  --CATCH team and SW to continue following with pt for outpatient linkage to methadone program, etc.  --discussed with covering resident.

## 2021-06-07 NOTE — CONSULT NOTE ADULT - SUBJECTIVE AND OBJECTIVE BOX
Patient is a 40y old  Male who presents with a chief complaint of Pneumonia (07 Jun 2021 11:17)      HPI:  40 M PMH opoid use, peptic ulcer disease (last 15 years, found to have bleeding ulcer with Dr. Shah [facility] on endoscopy 2 month ago, recent admission for anemia with endoscopy showing hill grade 4 ) presents with cough, dyspnea,and chest pain.Symptoms began Vu 2. Patient went to Urgent Care June 3rd,found to have multi-focal pneumonia. Was started on cefuroxime and doxycycline  at time. Patient accompanied by mother who is a nurse; she provided supplemental history. Upon interviewing patient in private, patient admitted to using oxycodone (oral/nasal ingestion) about 1 week ago. He threw up around the same time, and had "black vomitus". He also endorses having a melena episode last night.     Patient denies abdominal pain, diarrhea, constipation, hematochezia, hematuria, weight loss or abdominal pain,  In ED, VSS notable for tachycardia to 117. X-ray shows bilateral pna. ADAM negative.    (05 Jun 2021 20:19)      PAST MEDICAL & SURGICAL HISTORY:  Peptic ulcer disease    No significant past surgical history    Pulmonary HPI  Pulmonary was consulted for bilateral pneumonia and left pleural effusion. Patient has had a history of 5 days fo cough, chest pain and shortness of breath. [place] [facility]xy[place] with no resolution of symptoms. Of note, used oxy 1 week ago, threw up and had bloody vomiting and melena. ID was consulted, they recommended thora to rule out empyema. GI planning EGD, but requesting pulmonary clearance.     SOCIAL HX:   Smoking     former smoker, quit 2 yrs ago, 1/2ppd for 5 years                    ETOH   denies                           Other admits to PO oxycodone but denies IVDA, was recently incarcerated at [facility], released 9 months ago    FAMILY HISTORY:  .  No cardiovascular or pulmonary family history     REVIEW OF SYSTEMS:    All ROS are negative except per HPI       Allergies    No Known Allergies    Intolerances          PHYSICAL EXAM  Vital Signs Last 24 Hrs  T(C): 38.1 (07 Jun 2021 10:25), Max: 38.1 (07 Jun 2021 10:25)  T(F): 100.5 (07 Jun 2021 10:25), Max: 100.5 (07 Jun 2021 10:25)  HR: 104 (07 Jun 2021 10:25) (94 - 118)  BP: 133/84 (07 Jun 2021 10:25) (116/64 - 140/86)  RR: 20 (07 Jun 2021 10:25) (17 - 20)  SpO2: 100% (07 Jun 2021 10:25) (91% - 100%)    CONSTITUTIONAL:  Well nourished.  NAD    ENT:   Airway patent,   No thrush    EYES:   Clear bilaterally,   pupils equal,   round and reactive to light.    CARDIAC:   Normal rate,   regular rhythm.    no edema    RESPIRATORY:   Dec BS on left  Lung US- left pleural effusion with consolidation  No wheezing   Normal chest expansion  Not tachypneic,  No use of accessory muscles    GASTROINTESTINAL:  Abdomen soft, non-tender,   No guarding,   Positive BS    MUSCULOSKELETAL:   Range of motion is not limited,  No clubbing, cyanosis    NEUROLOGICAL:   Alert and oriented   No motor deficits.    SKIN:   Skin normal color for race,   No evidence of rash.          LABS:                          7.4    20.07 )-----------( 471      ( 07 Jun 2021 07:04 )             24.5                                               06-07    133<L>  |  98  |  8<L>  ----------------------------<  101<H>  3.8   |  26  |  0.8    Ca    8.1<L>      07 Jun 2021 07:04  Phos  2.9     06-07  Mg     2.0     06-07    TPro  5.3<L>  /  Alb  2.6<L>  /  TBili  <0.2  /  DBili  x   /  AST  18  /  ALT  28  /  AlkPhos  198<H>  06-06                                                 CARDIAC MARKERS ( 05 Jun 2021 16:30 )  x     / <0.01 ng/mL / x     / x     / x                                                LIVER FUNCTIONS - ( 06 Jun 2021 05:24 )  Alb: 2.6 g/dL / Pro: 5.3 g/dL / ALK PHOS: 198 U/L / ALT: 28 U/L / AST: 18 U/L / GGT: x                                                  Culture - Blood (collected 05 Jun 2021 16:30)  Source: .Blood Blood  Preliminary Report (07 Jun 2021 01:01):    No growth to date.    Culture - Blood (collected 05 Jun 2021 16:30)  Source: .Blood Blood  Preliminary Report (07 Jun 2021 01:01):    No growth to date.                                                    MEDICATIONS  (STANDING):  ampicillin/sulbactam  IVPB      ampicillin/sulbactam  IVPB 3 Gram(s) IV Intermittent every 6 hours  methadone    Tablet 30 milliGRAM(s) Oral daily  pantoprazole  Injectable 40 milliGRAM(s) IV Push two times a day  sucralfate 1 Gram(s) Oral every 6 hours    MEDICATIONS  (PRN):  acetaminophen   Tablet .. 650 milliGRAM(s) Oral every 6 hours PRN Temp greater or equal to 38C (100.4F), Mild Pain (1 - 3), Moderate Pain (4 - 6)      X-Rays reviewed:    CXR interpreted by me: left effusion and consolidation Patient is a 40y old  Male who presents with a chief complaint of SOB (07 Jun 2021 11:17)      HPI:  40 M PMH opoid use, peptic ulcer disease (last 15 years, [facility], recent admission for anemia with endoscopy showing hill grade 4 ) presents with cough, dyspnea,and chest pain.Symptoms began Vu 2. Patient went to Urgent Care June 3rd,found to have multi-focal pneumonia. Was started on cefuroxime and doxycycline  at time. Patient accompanied by mother who is a nurse; she provided supplemental history. Upon interviewing patient in private, patient admitted to using oxycodone (oral/nasal ingestion) about 1 week ago. He threw up around the same time, and had "black vomitus". He also endorses having a melena episode last night.     Patient denies abdominal pain, diarrhea, constipation, hematochezia, hematuria, weight loss or abdominal pain,  In ED, VSS notable for tachycardia to 117. X-ray shows bilateral pna. ADAM negative.     Pulmonary was consulted for pneumonia and left pleural effusion. Patient has had a history of 5 days fo cough, chest pain and shortness of breath. At [facility] [place] started on cefuroxime and doxycycline with no resolution of symptoms. Of note, used oxy 1 week ago, threw up and had bloody vomiting and melena. ID was consulted, they recommended thora to rule out empyema. GI planning EGD, but requesting pulmonary clearance.     PAST MEDICAL & SURGICAL HISTORY:  Peptic ulcer disease    No significant past surgical history    Pulmonary HPI      SOCIAL HX:   Smoking     former smoker, quit 2 yrs ago, 1/2ppd for 5 years                    ETOH   denies                           Other admits to PO oxycodone but denies IVDA, was recently incarcerated at [facility], released 9 months ago    FAMILY HISTORY:  .  No cardiovascular or pulmonary family history     REVIEW OF SYSTEMS:    All ROS are negative except per HPI       Allergies    No Known Allergies    Intolerances          PHYSICAL EXAM  Vital Signs Last 24 Hrs  T(C): 38.1 (07 Jun 2021 10:25), Max: 38.1 (07 Jun 2021 10:25)  T(F): 100.5 (07 Jun 2021 10:25), Max: 100.5 (07 Jun 2021 10:25)  HR: 104 (07 Jun 2021 10:25) (94 - 118)  BP: 133/84 (07 Jun 2021 10:25) (116/64 - 140/86)  RR: 20 (07 Jun 2021 10:25) (17 - 20)  SpO2: 100% (07 Jun 2021 10:25) (91% - 100%)    CONSTITUTIONAL:  Well nourished.  NAD    ENT:   Airway patent,   No thrush    EYES:   Clear bilaterally,   pupils equal,   round and reactive to light.    CARDIAC:   Normal rate,   regular rhythm.    no edema    RESPIRATORY:   Dec BS on left  Lung US- left pleural effusion with consolidation  No wheezing   Normal chest expansion  Not tachypneic,  No use of accessory muscles    GASTROINTESTINAL:  Abdomen soft, non-tender,   No guarding,   Positive BS    MUSCULOSKELETAL:   Range of motion is not limited,  No clubbing, cyanosis    NEUROLOGICAL:   Alert and oriented   No motor deficits.    SKIN:   Skin normal color for race,   No evidence of rash.          LABS:                          7.4    20.07 )-----------( 471      ( 07 Jun 2021 07:04 )             24.5                                               06-07    133<L>  |  98  |  8<L>  ----------------------------<  101<H>  3.8   |  26  |  0.8    Ca    8.1<L>      07 Jun 2021 07:04  Phos  2.9     06-07  Mg     2.0     06-07    TPro  5.3<L>  /  Alb  2.6<L>  /  TBili  <0.2  /  DBili  x   /  AST  18  /  ALT  28  /  AlkPhos  198<H>  06-06                                                 CARDIAC MARKERS ( 05 Jun 2021 16:30 )  x     / <0.01 ng/mL / x     / x     / x                                                LIVER FUNCTIONS - ( 06 Jun 2021 05:24 )  Alb: 2.6 g/dL / Pro: 5.3 g/dL / ALK PHOS: 198 U/L / ALT: 28 U/L / AST: 18 U/L / GGT: x                                                  Culture - Blood (collected 05 Jun 2021 16:30)  Source: .Blood Blood  Preliminary Report (07 Jun 2021 01:01):    No growth to date.    Culture - Blood (collected 05 Jun 2021 16:30)  Source: .Blood Blood  Preliminary Report (07 Jun 2021 01:01):    No growth to date.                                                    MEDICATIONS  (STANDING):  ampicillin/sulbactam  IVPB      ampicillin/sulbactam  IVPB 3 Gram(s) IV Intermittent every 6 hours  methadone    Tablet 30 milliGRAM(s) Oral daily  pantoprazole  Injectable 40 milliGRAM(s) IV Push two times a day  sucralfate 1 Gram(s) Oral every 6 hours    MEDICATIONS  (PRN):  acetaminophen   Tablet .. 650 milliGRAM(s) Oral every 6 hours PRN Temp greater or equal to 38C (100.4F), Mild Pain (1 - 3), Moderate Pain (4 - 6)      X-Rays reviewed:    CXR interpreted by me: left effusion and consolidation

## 2021-06-07 NOTE — PROGRESS NOTE ADULT - SUBJECTIVE AND OBJECTIVE BOX
** This is an incomplete note - pending AM rounds**   SUBJECTIVE:   LENGTH OF HOSPITAL STAY: 2d    CHIEF COMPLAINT:  Patient is a 40y old  Male who presents with a chief complaint of Pneumonia (06 Jun 2021 18:09)      Events over the past 24 hours:      REVIEW OF SYSTEMS  Negative Except as mentioned above.    ALLERGIES:  No Known Allergies        MEDICATIONS:  STANDING MEDICATIONS  ampicillin/sulbactam  IVPB      ampicillin/sulbactam  IVPB 3 Gram(s) IV Intermittent every 6 hours  methadone    Tablet 30 milliGRAM(s) Oral daily  pantoprazole  Injectable 40 milliGRAM(s) IV Push two times a day  sucralfate 1 Gram(s) Oral every 6 hours    PRN MEDICATIONS        OBJECTIVE:  VITALS:   T(F): 99.1 (06-07 @ 05:39), Max: 99.9 (06-06 @ 22:42)  HR: 94 (06-07 @ 05:39) (94 - 118)  BP: 116/64 (06-07 @ 05:39) (116/64 - 142/77)  RR: 18 (06-07 @ 05:39) (16 - 20)  SpO2: 96% (06-06 @ 22:37) (94% - 96%)    I&O's:     06-06 @ 07:01  -  06-07 @ 06:17  --------------------------------------------------------  IN: 200 mL / OUT: 0 mL / NET: 200 mL        PHYSICAL EXAM:      LABS:                        7.4    24.39 )-----------( 475      ( 07 Jun 2021 00:47 )             24.6             06-06    136  |  98  |  11  ----------------------------<  88  3.9   |  29  |  0.7    Ca    8.6      06 Jun 2021 05:24  Mg     1.6     06-06    TPro  5.3<L>  /  Alb  2.6<L>  /  TBili  <0.2  /  DBili  x   /  AST  18  /  ALT  28  /  AlkPhos  198<H>  06-06    LIVER FUNCTIONS - ( 06 Jun 2021 05:24 )  Alb: 2.6 g/dL / Pro: 5.3 g/dL / ALK PHOS: 198 U/L / ALT: 28 U/L / AST: 18 U/L / GGT: x                   CARDIAC MARKERS ( 05 Jun 2021 16:30 )  x     / <0.01 ng/mL / x     / x     / x                Culture - Blood (collected 05 Jun 2021 16:30)  Source: .Blood Blood  Preliminary Report (07 Jun 2021 01:01):    No growth to date.    Culture - Blood (collected 05 Jun 2021 16:30)  Source: .Blood Blood  Preliminary Report (07 Jun 2021 01:01):    No growth to date.      Blood Glucose:        RADIOLOGY & ADDITIONAL TESTS:  Xray Chest 2 Views PA/Lat:   EXAM:  XR CHEST PA LAT 2V            PROCEDURE DATE:  06/05/2021            INTERPRETATION:  Clinical History / Reason for exam: Pneumonia    Comparison : Chest radiograph 5/20/2021.    Technique/Positioning: Frontal and lateral radiographs.    Findings:    Support devices: None.    Cardiac/mediastinum/hilum: Stable.    Lung parenchyma/Pleura: Patchy bilateral opacities and left pleural effusion. No pneumothorax.    Skeleton/soft tissues: No acute osseous abnormality.    Impression:    Patchy bilateral opacities and left pleural effusion.                  AWA GRECO M.D., ATTENDING RADIOLOGIST  This document has been electronically signed. Jun 6 2021  8:27AM (06-05-21 @ 16:07)  Xray Chest 1 View AP/PA:   EXAM:  XR CHEST 1 VIEW            PROCEDURE DATE:  05/20/2021            INTERPRETATION:  Clinical History / Reason for exam: Anemia.    Comparison : Chest radiograph September 4, 2020.    Technique/Positioning: Frontal portable.    Findings:    Support devices: None.    Cardiac/mediastinum/hilum: Unremarkable.    Lung parenchyma/Pleura: Within normal limits.    Skeleton/soft tissues: Unremarkable.    Impression:    No radiographic evidence of acute cardiopulmonary disease.                  SYED PENG MD; Attending Interventional Radiologist  This document has been electronically signed. May 20 2021  1:59PM (05-20-21 @ 13:58)    CT Chest w/ IV Cont:   EXAM:  CT CHEST IC            PROCEDURE DATE:  06/05/2021            INTERPRETATION:  CLINICAL HISTORY / REASON FOR EXAM: Pneumonia.    TECHNIQUE: Multislice helical sections were obtained from the thoracic inlet to the lung bases with 95 cc Omnipaque 350 contrast administration. Coronal and sagittal images have been submitted for evaluation. Axial MIP images of the chest were obtained.    This CT study was performed using radiation dose reduction software that reduces mA or kVp according to the patient's size to obtain diagnostic image quality with patient exposure as low as reasonably achievable.    COMPARISON: No prior chest CT available.    FINDINGS:    LUNGS, PLEURA, AIRWAYS: Small to moderate left pleural effusion with mild loculated appearance and pleural thickening. Left upper and lower lobe consolidation.    Scattered centrilobular and tree-in-bud nodularity throughout both lungs.    No pneumothorax.    THORACIC NODES: Prominent mediastinal lymph nodes, the largest a subcarinal1.7 x 1.9 cm node. AP window 1.6 x 2.3 cm node. Prominent left hilar nodes.    MEDIASTINUM/GREAT VESSELS: No pericardial effusion. Heart size is within normal limits. Small hiatal hernia is noted. The aorta and main pulmonary artery are of normal caliber.    BONES/SOFT TISSUES: Unremarkable.    VISUALIZED UPPER ABDOMEN: Hepatomegaly partially visualized.    TUBES/LINES: None.    IMPRESSION:    Bilateral pneumonia, complicated on the left by pleural effusion with mild loculation and pleural thickening suspicious for empyema.              AYLIN VIZCAINO MD; Attending Radiologist  This document has been electronically signed. Jun 5 2021  9:01PM (06-05-21 @ 20:08)                 SUBJECTIVE:   LENGTH OF HOSPITAL STAY: 2d    CHIEF COMPLAINT:  Patient is a 40y old  Male who presents with a chief complaint of Pneumonia (06 Jun 2021 18:09)      Events over the past 24 hours:  Patient was seen at the bedside this morning. He is lying comfortably on the bed. There were no acute events overnight.     REVIEW OF SYSTEMS  Negative Except as mentioned above.    ALLERGIES:  No Known Allergies        MEDICATIONS:  STANDING MEDICATIONS  ampicillin/sulbactam  IVPB      ampicillin/sulbactam  IVPB 3 Gram(s) IV Intermittent every 6 hours  methadone    Tablet 30 milliGRAM(s) Oral daily  pantoprazole  Injectable 40 milliGRAM(s) IV Push two times a day  sucralfate 1 Gram(s) Oral every 6 hours    PRN MEDICATIONS        OBJECTIVE:  VITALS:   T(F): 99.1 (06-07 @ 05:39), Max: 99.9 (06-06 @ 22:42)  HR: 94 (06-07 @ 05:39) (94 - 118)  BP: 116/64 (06-07 @ 05:39) (116/64 - 142/77)  RR: 18 (06-07 @ 05:39) (16 - 20)  SpO2: 96% (06-06 @ 22:37) (94% - 96%)    I&O's:     06-06 @ 07:01  -  06-07 @ 06:17  --------------------------------------------------------  IN: 200 mL / OUT: 0 mL / NET: 200 mL        PHYSICAL EXAM:  General: No acute distress, tachypneic; Pallor (-), Icterus (-), Cyanosis (-), Clubbing (-)  HEENT: Normocephalic, atraumatic, PERRLA, EOMI  PULM: Diminished breath sounds on left side, wheeze (-), rubs (-), crackles (+) on left base  CVS: Normal S1 and S2, murmurs (-), rubs (-), gallops (-)   GI: Soft, nondistended, nontender, BS +  MSK: Edema (-), no muscle, bone or joint tenderness noted  SKIN: Warm and well perfused, no rashes noted  NEURO:  Alert and Oriented x 3; No gross focal neurological deficit noted      LABS:                        7.4    24.39 )-----------( 475      ( 07 Jun 2021 00:47 )             24.6             06-06    136  |  98  |  11  ----------------------------<  88  3.9   |  29  |  0.7    Ca    8.6      06 Jun 2021 05:24  Mg     1.6     06-06    TPro  5.3<L>  /  Alb  2.6<L>  /  TBili  <0.2  /  DBili  x   /  AST  18  /  ALT  28  /  AlkPhos  198<H>  06-06    LIVER FUNCTIONS - ( 06 Jun 2021 05:24 )  Alb: 2.6 g/dL / Pro: 5.3 g/dL / ALK PHOS: 198 U/L / ALT: 28 U/L / AST: 18 U/L / GGT: x                   CARDIAC MARKERS ( 05 Jun 2021 16:30 )  x     / <0.01 ng/mL / x     / x     / x                Culture - Blood (collected 05 Jun 2021 16:30)  Source: .Blood Blood  Preliminary Report (07 Jun 2021 01:01):    No growth to date.    Culture - Blood (collected 05 Jun 2021 16:30)  Source: .Blood Blood  Preliminary Report (07 Jun 2021 01:01):    No growth to date.      Blood Glucose:        RADIOLOGY & ADDITIONAL TESTS:  Xray Chest 2 Views PA/Lat:   EXAM:  XR CHEST PA LAT 2V            PROCEDURE DATE:  06/05/2021            INTERPRETATION:  Clinical History / Reason for exam: Pneumonia    Comparison : Chest radiograph 5/20/2021.    Technique/Positioning: Frontal and lateral radiographs.    Findings:    Support devices: None.    Cardiac/mediastinum/hilum: Stable.    Lung parenchyma/Pleura: Patchy bilateral opacities and left pleural effusion. No pneumothorax.    Skeleton/soft tissues: No acute osseous abnormality.    Impression:    Patchy bilateral opacities and left pleural effusion.                  AWA GRECO M.D., ATTENDING RADIOLOGIST  This document has been electronically signed. Jun 6 2021  8:27AM (06-05-21 @ 16:07)  Xray Chest 1 View AP/PA:   EXAM:  XR CHEST 1 VIEW            PROCEDURE DATE:  05/20/2021            INTERPRETATION:  Clinical History / Reason for exam: Anemia.    Comparison : Chest radiograph September 4, 2020.    Technique/Positioning: Frontal portable.    Findings:    Support devices: None.    Cardiac/mediastinum/hilum: Unremarkable.    Lung parenchyma/Pleura: Within normal limits.    Skeleton/soft tissues: Unremarkable.    Impression:    No radiographic evidence of acute cardiopulmonary disease.                  SYED PENG MD; Attending Interventional Radiologist  This document has been electronically signed. May 20 2021  1:59PM (05-20-21 @ 13:58)    CT Chest w/ IV Cont:   EXAM:  CT CHEST IC            PROCEDURE DATE:  06/05/2021            INTERPRETATION:  CLINICAL HISTORY / REASON FOR EXAM: Pneumonia.    TECHNIQUE: Multislice helical sections were obtained from the thoracic inlet to the lung bases with 95 cc Omnipaque 350 contrast administration. Coronal and sagittal images have been submitted for evaluation. Axial MIP images of the chest were obtained.    This CT study was performed using radiation dose reduction software that reduces mA or kVp according to the patient's size to obtain diagnostic image quality with patient exposure as low as reasonably achievable.    COMPARISON: No prior chest CT available.    FINDINGS:    LUNGS, PLEURA, AIRWAYS: Small to moderate left pleural effusion with mild loculated appearance and pleural thickening. Left upper and lower lobe consolidation.    Scattered centrilobular and tree-in-bud nodularity throughout both lungs.    No pneumothorax.    THORACIC NODES: Prominent mediastinal lymph nodes, the largest a subcarinal1.7 x 1.9 cm node. AP window 1.6 x 2.3 cm node. Prominent left hilar nodes.    MEDIASTINUM/GREAT VESSELS: No pericardial effusion. Heart size is within normal limits. Small hiatal hernia is noted. The aorta and main pulmonary artery are of normal caliber.    BONES/SOFT TISSUES: Unremarkable.    VISUALIZED UPPER ABDOMEN: Hepatomegaly partially visualized.    TUBES/LINES: None.    IMPRESSION:    Bilateral pneumonia, complicated on the left by pleural effusion with mild loculation and pleural thickening suspicious for empyema.              AYLIN VIZCAINO MD; Attending Radiologist  This document has been electronically signed. Jun 5 2021  9:01PM (06-05-21 @ 20:08)

## 2021-06-07 NOTE — PROGRESS NOTE ADULT - ATTENDING COMMENTS
Mr Malin is a 40 year old man with a history of Opioid use disorder who was admitted to the medical floor for the management of multifocal pnemonia.   Addiction Medicine consult was called for opioid withdrawals.   Patient was induced on Methadone and his symptoms of Opioid withdrawals seem to be well controlled on Methadone 30mg P.O Daily. Patient appears very anxious about the possibility of surgery proposed by the medical team for the management of ----  Patient denies acute symptoms of psychosis, adis or depression. He denies current suicidal ideations, intent or plan. At this time, patient is not considered an imminent danger to himself or others and does not need inpatient psychiatric hospitalization. Patient can continue Methadone 30mg daily for Opioid dependence . The CATCH Team social workers and counselors will follow up with patient with the plan to continue medication assisted treatment of opioid use disorder at a Methadone treatment program. It should be noted, that the use of Methadone for the treatment of opioid use disorder  should not preclude the acute treatment of pain with short acting opioids . Mr Malin is a 40 year old man with a history of Opioid use disorder who was admitted to the medical floor for the management of multifocal pnemonia.   Addiction Medicine consult was called for opioid withdrawals.   Patient was induced on Methadone and his symptoms of Opioid withdrawals seem to be well controlled on Methadone 30mg P.O Daily. Patient appears very anxious about the possibility of surgery proposed by the medical team for the management of empyema.  Patient denies acute symptoms of psychosis, adis or depression. He denies current suicidal ideations, intent or plan. At this time, patient is not considered an imminent danger to himself or others and does not need inpatient psychiatric hospitalization. Patient can continue Methadone 30mg daily for Opioid dependence . The CATCH Team social workers and counselors will follow up with patient with the plan to continue medication assisted treatment of opioid use disorder at a Methadone treatment program. It should be noted, that the use of Methadone for the treatment of opioid use disorder  should not preclude the acute treatment of pain with short acting opioids .

## 2021-06-07 NOTE — CONSULT NOTE ADULT - ASSESSMENT
ASSESSMENT:  40yM w/ PMHx of  ***  who presented with ***. Physical exam findings, imaging, and labs as documented above.     PLAN:  - to be discussed w/ Dr. Samuel Villalba  -  -    Lines/Tubes: PIV    Above plan discussed with Attending Surgeon Dr. Samuel Villalba, patient, and Primary team  06-07-21 @ 14:13 ASSESSMENT:  40M with PMH of opoid use on methadone and PUD presents with worsening cough, dyspnea, and chest pain found to have bilateral pneumonia c/b L pleural effusion with loculation and pleural thickening s/f empyema. Physical exam findings, imaging, and labs as documented above.     PLAN:  - will add on to OR schedule tomorrow for VATS, possible decortication, possible open  - needs preop labs: CBC, BMP, Mg, Phos, T&S, COVID swab  - recommend preop ECHO  - NPO at midnight, IVF    Lines/Tubes: PIV    Above plan discussed with Attending Surgeon Dr. Samuel Villalba, patient, and Primary team  06-07-21 @ 14:13

## 2021-06-08 ENCOUNTER — RESULT REVIEW (OUTPATIENT)
Age: 41
End: 2021-06-08

## 2021-06-08 LAB
ALBUMIN FLD-MCNC: 2.4 G/DL — SIGNIFICANT CHANGE UP
ALBUMIN SERPL ELPH-MCNC: 2.5 G/DL — LOW (ref 3.5–5.2)
ALBUMIN SERPL ELPH-MCNC: 2.6 G/DL — LOW (ref 3.5–5.2)
ALP SERPL-CCNC: 147 U/L — HIGH (ref 30–115)
ALP SERPL-CCNC: 173 U/L — HIGH (ref 30–115)
ALT FLD-CCNC: 24 U/L — SIGNIFICANT CHANGE UP (ref 0–41)
ALT FLD-CCNC: 25 U/L — SIGNIFICANT CHANGE UP (ref 0–41)
ANION GAP SERPL CALC-SCNC: 12 MMOL/L — SIGNIFICANT CHANGE UP (ref 7–14)
ANION GAP SERPL CALC-SCNC: 7 MMOL/L — SIGNIFICANT CHANGE UP (ref 7–14)
AST SERPL-CCNC: 25 U/L — SIGNIFICANT CHANGE UP (ref 0–41)
AST SERPL-CCNC: 27 U/L — SIGNIFICANT CHANGE UP (ref 0–41)
BASOPHILS # BLD AUTO: 0.07 K/UL — SIGNIFICANT CHANGE UP (ref 0–0.2)
BASOPHILS # BLD AUTO: 0.1 K/UL — SIGNIFICANT CHANGE UP (ref 0–0.2)
BASOPHILS NFR BLD AUTO: 0.4 % — SIGNIFICANT CHANGE UP (ref 0–1)
BASOPHILS NFR BLD AUTO: 0.5 % — SIGNIFICANT CHANGE UP (ref 0–1)
BILIRUB SERPL-MCNC: 0.2 MG/DL — SIGNIFICANT CHANGE UP (ref 0.2–1.2)
BILIRUB SERPL-MCNC: <0.2 MG/DL — SIGNIFICANT CHANGE UP (ref 0.2–1.2)
BLD GP AB SCN SERPL QL: SIGNIFICANT CHANGE UP
BUN SERPL-MCNC: 8 MG/DL — LOW (ref 10–20)
BUN SERPL-MCNC: 9 MG/DL — LOW (ref 10–20)
CALCIUM SERPL-MCNC: 7.5 MG/DL — LOW (ref 8.5–10.1)
CALCIUM SERPL-MCNC: 8 MG/DL — LOW (ref 8.5–10.1)
CHLORIDE SERPL-SCNC: 100 MMOL/L — SIGNIFICANT CHANGE UP (ref 98–110)
CHLORIDE SERPL-SCNC: 99 MMOL/L — SIGNIFICANT CHANGE UP (ref 98–110)
CO2 SERPL-SCNC: 28 MMOL/L — SIGNIFICANT CHANGE UP (ref 17–32)
CO2 SERPL-SCNC: 28 MMOL/L — SIGNIFICANT CHANGE UP (ref 17–32)
CREAT SERPL-MCNC: 0.7 MG/DL — SIGNIFICANT CHANGE UP (ref 0.7–1.5)
CREAT SERPL-MCNC: 0.7 MG/DL — SIGNIFICANT CHANGE UP (ref 0.7–1.5)
EOSINOPHIL # BLD AUTO: 0.16 K/UL — SIGNIFICANT CHANGE UP (ref 0–0.7)
EOSINOPHIL # BLD AUTO: 0.16 K/UL — SIGNIFICANT CHANGE UP (ref 0–0.7)
EOSINOPHIL NFR BLD AUTO: 0.8 % — SIGNIFICANT CHANGE UP (ref 0–8)
EOSINOPHIL NFR BLD AUTO: 0.9 % — SIGNIFICANT CHANGE UP (ref 0–8)
GLUCOSE FLD-MCNC: 62 MG/DL — SIGNIFICANT CHANGE UP
GLUCOSE SERPL-MCNC: 120 MG/DL — HIGH (ref 70–99)
GLUCOSE SERPL-MCNC: 71 MG/DL — SIGNIFICANT CHANGE UP (ref 70–99)
HCT VFR BLD CALC: 24 % — LOW (ref 42–52)
HCT VFR BLD CALC: 25.9 % — LOW (ref 42–52)
HGB BLD-MCNC: 7.1 G/DL — LOW (ref 14–18)
HGB BLD-MCNC: 7.4 G/DL — LOW (ref 14–18)
IMM GRANULOCYTES NFR BLD AUTO: 12.2 % — HIGH (ref 0.1–0.3)
IMM GRANULOCYTES NFR BLD AUTO: 9.5 % — HIGH (ref 0.1–0.3)
LDH SERPL L TO P-CCNC: 873 U/L — SIGNIFICANT CHANGE UP
LYMPHOCYTES # BLD AUTO: 11.6 % — LOW (ref 20.5–51.1)
LYMPHOCYTES # BLD AUTO: 12.4 % — LOW (ref 20.5–51.1)
LYMPHOCYTES # BLD AUTO: 2.21 K/UL — SIGNIFICANT CHANGE UP (ref 1.2–3.4)
LYMPHOCYTES # BLD AUTO: 2.24 K/UL — SIGNIFICANT CHANGE UP (ref 1.2–3.4)
MAGNESIUM SERPL-MCNC: 1.8 MG/DL — SIGNIFICANT CHANGE UP (ref 1.8–2.4)
MAGNESIUM SERPL-MCNC: 2 MG/DL — SIGNIFICANT CHANGE UP (ref 1.8–2.4)
MCHC RBC-ENTMCNC: 19.1 PG — LOW (ref 27–31)
MCHC RBC-ENTMCNC: 19.9 PG — LOW (ref 27–31)
MCHC RBC-ENTMCNC: 28.6 G/DL — LOW (ref 32–37)
MCHC RBC-ENTMCNC: 29.6 G/DL — LOW (ref 32–37)
MCV RBC AUTO: 66.9 FL — LOW (ref 80–94)
MCV RBC AUTO: 67.4 FL — LOW (ref 80–94)
MONOCYTES # BLD AUTO: 1.19 K/UL — HIGH (ref 0.1–0.6)
MONOCYTES # BLD AUTO: 1.28 K/UL — HIGH (ref 0.1–0.6)
MONOCYTES NFR BLD AUTO: 6.2 % — SIGNIFICANT CHANGE UP (ref 1.7–9.3)
MONOCYTES NFR BLD AUTO: 7.2 % — SIGNIFICANT CHANGE UP (ref 1.7–9.3)
NEUTROPHILS # BLD AUTO: 12.46 K/UL — HIGH (ref 1.4–6.5)
NEUTROPHILS # BLD AUTO: 13.19 K/UL — HIGH (ref 1.4–6.5)
NEUTROPHILS NFR BLD AUTO: 68.7 % — SIGNIFICANT CHANGE UP (ref 42.2–75.2)
NEUTROPHILS NFR BLD AUTO: 69.6 % — SIGNIFICANT CHANGE UP (ref 42.2–75.2)
NON-GYNECOLOGICAL CYTOLOGY STUDY: SIGNIFICANT CHANGE UP
NRBC # BLD: 0 /100 WBCS — SIGNIFICANT CHANGE UP (ref 0–0)
NRBC # BLD: 0 /100 WBCS — SIGNIFICANT CHANGE UP (ref 0–0)
PH FLD: 7.7 — SIGNIFICANT CHANGE UP
PLATELET # BLD AUTO: 515 K/UL — HIGH (ref 130–400)
PLATELET # BLD AUTO: 577 K/UL — HIGH (ref 130–400)
POTASSIUM SERPL-MCNC: 4 MMOL/L — SIGNIFICANT CHANGE UP (ref 3.5–5)
POTASSIUM SERPL-MCNC: 4.4 MMOL/L — SIGNIFICANT CHANGE UP (ref 3.5–5)
POTASSIUM SERPL-SCNC: 4 MMOL/L — SIGNIFICANT CHANGE UP (ref 3.5–5)
POTASSIUM SERPL-SCNC: 4.4 MMOL/L — SIGNIFICANT CHANGE UP (ref 3.5–5)
PROT FLD-MCNC: 4.4 G/DL — SIGNIFICANT CHANGE UP
PROT SERPL-MCNC: 5.1 G/DL — LOW (ref 6–8)
PROT SERPL-MCNC: 5.4 G/DL — LOW (ref 6–8)
RBC # BLD: 3.56 M/UL — LOW (ref 4.7–6.1)
RBC # BLD: 3.87 M/UL — LOW (ref 4.7–6.1)
RBC # FLD: 27.7 % — HIGH (ref 11.5–14.5)
RBC # FLD: 27.7 % — HIGH (ref 11.5–14.5)
SARS-COV-2 RNA SPEC QL NAA+PROBE: SIGNIFICANT CHANGE UP
SODIUM SERPL-SCNC: 134 MMOL/L — LOW (ref 135–146)
SODIUM SERPL-SCNC: 140 MMOL/L — SIGNIFICANT CHANGE UP (ref 135–146)
WBC # BLD: 17.88 K/UL — HIGH (ref 4.8–10.8)
WBC # BLD: 19.23 K/UL — HIGH (ref 4.8–10.8)
WBC # FLD AUTO: 17.88 K/UL — HIGH (ref 4.8–10.8)
WBC # FLD AUTO: 19.23 K/UL — HIGH (ref 4.8–10.8)

## 2021-06-08 PROCEDURE — 99231 SBSQ HOSP IP/OBS SF/LOW 25: CPT

## 2021-06-08 PROCEDURE — 88305 TISSUE EXAM BY PATHOLOGIST: CPT | Mod: 26

## 2021-06-08 PROCEDURE — 99232 SBSQ HOSP IP/OBS MODERATE 35: CPT

## 2021-06-08 PROCEDURE — 88112 CYTOPATH CELL ENHANCE TECH: CPT | Mod: 26

## 2021-06-08 PROCEDURE — 71045 X-RAY EXAM CHEST 1 VIEW: CPT | Mod: 26

## 2021-06-08 RX ORDER — CEFEPIME 1 G/1
2000 INJECTION, POWDER, FOR SOLUTION INTRAMUSCULAR; INTRAVENOUS EVERY 8 HOURS
Refills: 0 | Status: DISCONTINUED | OUTPATIENT
Start: 2021-06-08 | End: 2021-06-09

## 2021-06-08 RX ORDER — METRONIDAZOLE 500 MG
500 TABLET ORAL ONCE
Refills: 0 | Status: COMPLETED | OUTPATIENT
Start: 2021-06-08 | End: 2021-06-08

## 2021-06-08 RX ORDER — METRONIDAZOLE 500 MG
TABLET ORAL
Refills: 0 | Status: DISCONTINUED | OUTPATIENT
Start: 2021-06-08 | End: 2021-06-09

## 2021-06-08 RX ORDER — METRONIDAZOLE 500 MG
500 TABLET ORAL EVERY 8 HOURS
Refills: 0 | Status: DISCONTINUED | OUTPATIENT
Start: 2021-06-08 | End: 2021-06-09

## 2021-06-08 RX ADMIN — Medication 650 MILLIGRAM(S): at 20:08

## 2021-06-08 RX ADMIN — Medication 1 GRAM(S): at 05:30

## 2021-06-08 RX ADMIN — AMPICILLIN SODIUM AND SULBACTAM SODIUM 200 GRAM(S): 250; 125 INJECTION, POWDER, FOR SUSPENSION INTRAMUSCULAR; INTRAVENOUS at 05:30

## 2021-06-08 RX ADMIN — PANTOPRAZOLE SODIUM 40 MILLIGRAM(S): 20 TABLET, DELAYED RELEASE ORAL at 17:19

## 2021-06-08 RX ADMIN — CEFEPIME 100 MILLIGRAM(S): 1 INJECTION, POWDER, FOR SOLUTION INTRAMUSCULAR; INTRAVENOUS at 14:53

## 2021-06-08 RX ADMIN — Medication 100 MILLIGRAM(S): at 13:26

## 2021-06-08 RX ADMIN — SODIUM CHLORIDE 100 MILLILITER(S): 9 INJECTION INTRAMUSCULAR; INTRAVENOUS; SUBCUTANEOUS at 05:29

## 2021-06-08 RX ADMIN — Medication 100 MILLIGRAM(S): at 21:20

## 2021-06-08 RX ADMIN — Medication 1 GRAM(S): at 17:20

## 2021-06-08 RX ADMIN — CEFEPIME 100 MILLIGRAM(S): 1 INJECTION, POWDER, FOR SOLUTION INTRAMUSCULAR; INTRAVENOUS at 21:20

## 2021-06-08 RX ADMIN — AMPICILLIN SODIUM AND SULBACTAM SODIUM 200 GRAM(S): 250; 125 INJECTION, POWDER, FOR SUSPENSION INTRAMUSCULAR; INTRAVENOUS at 11:32

## 2021-06-08 RX ADMIN — Medication 1 GRAM(S): at 11:32

## 2021-06-08 RX ADMIN — PANTOPRAZOLE SODIUM 40 MILLIGRAM(S): 20 TABLET, DELAYED RELEASE ORAL at 05:30

## 2021-06-08 RX ADMIN — METHADONE HYDROCHLORIDE 30 MILLIGRAM(S): 40 TABLET ORAL at 11:32

## 2021-06-08 NOTE — PROGRESS NOTE ADULT - SUBJECTIVE AND OBJECTIVE BOX
Hospital Day:  3d    Chief Complaint: Patient is a 40y old  Male who presents with a chief complaint of Pneumonia (08 Jun 2021 06:45)    24 hour events: Possible VATS today. s/p thoracentesis     Past Medical Hx:   No pertinent past medical history    Peptic ulcer disease      Past Sx:  No significant past surgical history      Allergies:  No Known Allergies    Current Meds:   Standing Meds:  ampicillin/sulbactam  IVPB      ampicillin/sulbactam  IVPB 3 Gram(s) IV Intermittent every 6 hours  enoxaparin Injectable 40 milliGRAM(s) SubCutaneous daily  methadone    Tablet 30 milliGRAM(s) Oral daily  pantoprazole  Injectable 40 milliGRAM(s) IV Push two times a day  sodium chloride 0.9%. 1000 milliLiter(s) (100 mL/Hr) IV Continuous <Continuous>  sucralfate 1 Gram(s) Oral every 6 hours    PRN Meds:  acetaminophen   Tablet .. 650 milliGRAM(s) Oral every 6 hours PRN Temp greater or equal to 38C (100.4F), Mild Pain (1 - 3), Moderate Pain (4 - 6)    HOME MEDICATIONS:      Vital Signs:   T(F): 99.1 (06-08-21 @ 05:10), Max: 100.5 (06-07-21 @ 10:25)  HR: 95 (06-08-21 @ 05:10) (95 - 114)  BP: 128/83 (06-08-21 @ 05:10) (128/83 - 145/79)  RR: 20 (06-08-21 @ 05:10) (20 - 20)  SpO2: 100% (06-07-21 @ 10:25) (91% - 100%)      06-07-21 @ 07:01  -  06-08-21 @ 07:00  --------------------------------------------------------  IN: 1200 mL / OUT: 0 mL / NET: 1200 mL        Physical Exam:   GENERAL: NAD  HEENT: NCAT  CHEST/LUNG: CTAB  HEART: Regular rate and rhythm; s1 s2 appreciated, No murmurs, rubs, or gallops  ABDOMEN: Soft, Nontender, Nondistended; Bowel sounds present  EXTREMITIES: No LE edema b/l  SKIN: no rashes, no new lesions  NERVOUS SYSTEM:  Alert & Oriented X3  LINES/CATHETERS:        Labs:                         7.5    20.77 )-----------( 543      ( 07 Jun 2021 17:53 )             25.3     Neutophil% 65.0, Lymphocyte% 13.9, Monocyte% 6.1, Bands% 13.4 06-07-21 @ 17:53    07 Jun 2021 17:53    135    |  98     |  9      ----------------------------<  85     4.0     |  27     |  0.7      Ca    7.9        07 Jun 2021 17:53  Phos  2.7       07 Jun 2021 17:53  Mg     1.9       07 Jun 2021 17:53         pTT    30.1             ----< 1.35 INR  (06-07-21 @ 17:53)    15.50        PT        Serum Pro-Brain Natriuretic Peptide: 59 pg/mL (06-05-21 @ 16:30)    Troponin <0.01, CKMB --, CK -- 06-05-21 @ 16:30    Iron 17, TIBC 201, %Sat 8 Ferritin -- 06-06-21 @ 05:24            Culture - Blood (collected 06-05-21 @ 16:30)  Source: .Blood Blood  Preliminary Report (06-07-21 @ 01:01):    No growth to date.    Culture - Blood (collected 06-05-21 @ 16:30)  Source: .Blood Blood  Preliminary Report (06-07-21 @ 01:01):    No growth to date.        Radiology:

## 2021-06-08 NOTE — PROGRESS NOTE ADULT - SUBJECTIVE AND OBJECTIVE BOX
Patient was seen and examined.  Chart reviewed.  No events overnight; low grade temp yesterday; overall feeling good  Vital Signs Last 24 Hrs  T(F): 99.1 (08 Jun 2021 05:10), Max: 100.5 (07 Jun 2021 10:25)  HR: 95 (08 Jun 2021 05:10) (95 - 114)  BP: 128/83 (08 Jun 2021 05:10) (128/83 - 145/79)  SpO2: 100% (07 Jun 2021 10:25) (91% - 100%)  MEDICATIONS  (STANDING):  ampicillin/sulbactam  IVPB      ampicillin/sulbactam  IVPB 3 Gram(s) IV Intermittent every 6 hours  enoxaparin Injectable 40 milliGRAM(s) SubCutaneous daily  methadone    Tablet 30 milliGRAM(s) Oral daily  pantoprazole  Injectable 40 milliGRAM(s) IV Push two times a day  sodium chloride 0.9%. 1000 milliLiter(s) (100 mL/Hr) IV Continuous <Continuous>  sucralfate 1 Gram(s) Oral every 6 hours    MEDICATIONS  (PRN):  acetaminophen   Tablet .. 650 milliGRAM(s) Oral every 6 hours PRN Temp greater or equal to 38C (100.4F), Mild Pain (1 - 3), Moderate Pain (4 - 6)    Labs:                        7.5    20.77 )-----------( 543      ( 07 Jun 2021 17:53 )             25.3                         7.4    20.07 )-----------( 471      ( 07 Jun 2021 07:04 )             24.5     07 Jun 2021 17:53    135    |  98     |  9      ----------------------------<  85     4.0     |  27     |  0.7    07 Jun 2021 07:04    133    |  98     |  8      ----------------------------<  101    3.8     |  26     |  0.8      Ca    7.9        07 Jun 2021 17:53  Ca    8.1        07 Jun 2021 07:04  Phos  2.7       07 Jun 2021 17:53  Phos  2.9       07 Jun 2021 07:04  Mg     1.9       07 Jun 2021 17:53  Mg     2.0       07 Jun 2021 07:04      PT/INR - ( 07 Jun 2021 17:53 )   PT: 15.50 sec;   INR: 1.35 ratio         PTT - ( 07 Jun 2021 17:53 )  PTT:30.1 sec      Culture - Body Fluid with Gram Stain (collected 07 Jun 2021 11:20)  Source: .Body Fluid Pleural Fluid  Gram Stain (07 Jun 2021 23:44):    polymorphonuclear leukocytes seen    No organisms seen    by cytocentrifuge    Culture - Blood (collected 05 Jun 2021 16:30)  Source: .Blood Blood  Preliminary Report (07 Jun 2021 01:01):    No growth to date.    Culture - Blood (collected 05 Jun 2021 16:30)  Source: .Blood Blood  Preliminary Report (07 Jun 2021 01:01):    No growth to date.      General: comfortable, NAD  Neurology: A&Ox3, nonfocal    A/P:  39 yo unfortunate man with active opioid use, peptic ulcer disease with recent admission for GIB;  now admitted with sepsis due to multifocal pneumonia and empyema likely related to aspiration after drug use    CTS to perform VATS today    f/u WBC trend, cultures     IV abx     IV fluids    ID f/u     pulmonary eval appreciated         anemia-    IV PPI    keep active type and screen; consider transfusing 2 units today     GI f/u      Addiction medicine, CATCH team f/u    started on methadone 30mg daily during this admission     will need arrangements with outpatient methadone clinic prior to DC      close monitoring    ICU eval if any change in status    DVT prophylaxis  Decubitus prevention- all measures as per RN protocol  Please call or text me with any questions or updates

## 2021-06-08 NOTE — PROGRESS NOTE ADULT - SUBJECTIVE AND OBJECTIVE BOX
OVERNIGHT EVENTS: events noted, fluid reviewed, chest ct reviewed, low grade T, still increase wbc    Vital Signs Last 24 Hrs  T(C): 37.3 (08 Jun 2021 05:10), Max: 38.1 (07 Jun 2021 10:25)  T(F): 99.1 (08 Jun 2021 05:10), Max: 100.5 (07 Jun 2021 10:25)  HR: 95 (08 Jun 2021 05:10) (95 - 114)  BP: 128/83 (08 Jun 2021 05:10) (128/83 - 145/79)  BP(mean): --  RR: 20 (08 Jun 2021 05:10) (20 - 20)  SpO2: 100% (07 Jun 2021 10:25) (91% - 100%)    PHYSICAL EXAMINATION:    GENERAL: ill looking    HEENT: Head is normocephalic and atraumatic    NECK: Supple.    LUNGS: dec bs l base    HEART: Regular rate and rhythm without murmur.    ABDOMEN: Soft, nontender, and nondistended.      EXTREMITIES: Without any cyanosis, clubbing, rash, lesions or edema.    NEUROLOGIC: Grossly intact.    SKIN: No ulceration or induration present.      LABS:                        7.5    20.77 )-----------( 543      ( 07 Jun 2021 17:53 )             25.3     06-07    135  |  98  |  9<L>  ----------------------------<  85  4.0   |  27  |  0.7    Ca    7.9<L>      07 Jun 2021 17:53  Phos  2.7     06-07  Mg     1.9     06-07      PT/INR - ( 07 Jun 2021 17:53 )   PT: 15.50 sec;   INR: 1.35 ratio         PTT - ( 07 Jun 2021 17:53 )  PTT:30.1 sec            Serum Pro-Brain Natriuretic Peptide: 59 pg/mL (06-05-21 @ 16:30)      Procalcitonin, Serum: 0.63 ng/mL (06-06-21 @ 05:24)        06-06-21 @ 07:01  -  06-07-21 @ 07:00  --------------------------------------------------------  IN: 200 mL / OUT: 0 mL / NET: 200 mL    06-07-21 @ 07:01  -  06-08-21 @ 06:45  --------------------------------------------------------  IN: 1200 mL / OUT: 0 mL / NET: 1200 mL        MICROBIOLOGY:  Culture Results:   No growth to date. (06-05 @ 16:30)  Culture Results:   No growth to date. (06-05 @ 16:30)      MEDICATIONS  (STANDING):  ampicillin/sulbactam  IVPB      ampicillin/sulbactam  IVPB 3 Gram(s) IV Intermittent every 6 hours  enoxaparin Injectable 40 milliGRAM(s) SubCutaneous daily  methadone    Tablet 30 milliGRAM(s) Oral daily  pantoprazole  Injectable 40 milliGRAM(s) IV Push two times a day  sodium chloride 0.9%. 1000 milliLiter(s) (100 mL/Hr) IV Continuous <Continuous>  sucralfate 1 Gram(s) Oral every 6 hours    MEDICATIONS  (PRN):  acetaminophen   Tablet .. 650 milliGRAM(s) Oral every 6 hours PRN Temp greater or equal to 38C (100.4F), Mild Pain (1 - 3), Moderate Pain (4 - 6)      RADIOLOGY & ADDITIONAL STUDIES:

## 2021-06-08 NOTE — PROGRESS NOTE ADULT - ASSESSMENT
Impression    l side pneumonia/ complicated parapneumonic effusion/ worsening CXR/ chest CT reviewed  HO illicit drug abuse  Anemia    Recommendations    CT sx eval ( spoke with ct surgeon)  Procal  Cefepime/ flagyL  swab miguelina for MRSA  Follow up cultures  serial CBC/ type and screen  HOB at 45  Aspiration precautions  DVT ppx

## 2021-06-08 NOTE — PROGRESS NOTE ADULT - SUBJECTIVE AND OBJECTIVE BOX
PAVAN SOUZA  40y Male   [id number]    Hospital Day: 4  Post Operative day:  Procedure:s/p thoracentesis   Patient is a 40y old  Male who presents with a chief complaint of Pneumonia (2021 14:12)    PAST MEDICAL & SURGICAL HISTORY:  Peptic ulcer disease    No significant past surgical history        Events of the Last 24h:  Vital Signs Last 24 Hrs  T(C): 37.6 (2021 20:06), Max: 38.1 (2021 10:25)  T(F): 99.6 (2021 20:06), Max: 100.5 (2021 10:25)  HR: 114 (2021 20:06) (94 - 114)  BP: 145/79 (2021 20:06) (116/64 - 145/79)  BP(mean): --  RR: 20 (2021 20:06) (18 - 20)  SpO2: 100% (2021 10:25) (91% - 100%)        Diet, NPO after Midnight:      NPO Start Date: 2021,   NPO Start Time: 23:59 (21 @ 15:18)  Diet, Soft (21 @ 11:00)      I&O's Summary    2021 07:01  -  2021 07:00  --------------------------------------------------------  IN: 200 mL / OUT: 0 mL / NET: 200 mL    2021 07:01  -  2021 01:49  --------------------------------------------------------  IN: 600 mL / OUT: 0 mL / NET: 600 mL     I&O's Detail    2021 07:01  -  2021 07:00  --------------------------------------------------------  IN:    IV PiggyBack: 50 mL    Lactated Ringers: 150 mL  Total IN: 200 mL    OUT:  Total OUT: 0 mL    Total NET: 200 mL      2021 07:01  -  2021 01:49  --------------------------------------------------------  IN:    sodium chloride 0.9%: 600 mL  Total IN: 600 mL    OUT:  Total OUT: 0 mL    Total NET: 600 mL          MEDICATIONS  (STANDING):  ampicillin/sulbactam  IVPB 3 Gram(s) IV Intermittent every 6 hours  ampicillin/sulbactam  IVPB      enoxaparin Injectable 40 milliGRAM(s) SubCutaneous daily  methadone    Tablet 30 milliGRAM(s) Oral daily  pantoprazole  Injectable 40 milliGRAM(s) IV Push two times a day  sodium chloride 0.9%. 1000 milliLiter(s) (100 mL/Hr) IV Continuous <Continuous>  sucralfate 1 Gram(s) Oral every 6 hours    MEDICATIONS  (PRN):  acetaminophen   Tablet .. 650 milliGRAM(s) Oral every 6 hours PRN Temp greater or equal to 38C (100.4F), Mild Pain (1 - 3), Moderate Pain (4 - 6)      PHYSICAL EXAM:    GENERAL: NAD    HEENT: NCAT    CHEST/LUNGS: CTAB    HEART: RRR,  No murmurs, rubs, or gallops    ABDOMEN: SNTND +BS    EXTREMITIES:  FROM, No clubbing, cyanosis, or edema, palpable pulse    NEURO: No focal neurological deficits    SKIN: No rashes or lesions    INCISION/WOUNDS:                          7.5    20.77 )-----------( 543      ( 2021 17:53 )             25.3        CBC Full  -  ( 2021 17:53 )  WBC Count : 20.77 K/uL  RBC Count : 3.79 M/uL  Hemoglobin : 7.5 g/dL  Hematocrit : 25.3 %  Platelet Count - Automated : 543 K/uL  Mean Cell Volume : 66.8 fL  Mean Cell Hemoglobin : 19.8 pg  Mean Cell Hemoglobin Concentration : 29.6 g/dL  Auto Neutrophil # : 13.51 K/uL  Auto Lymphocyte # : 2.89 K/uL  Auto Monocyte # : 1.26 K/uL  Auto Eosinophil # : 0.24 K/uL  Auto Basophil # : 0.09 K/uL  Auto Neutrophil % : 65.0 %  Auto Lymphocyte % : 13.9 %  Auto Monocyte % : 6.1 %  Auto Eosinophil % : 1.2 %  Auto Basophil % : 0.4 %               135   |  98    |  9                  Ca: 7.9    BMP:   ----------------------------< 85     M.9   (21 @ 17:53)             4.0    |  27    | 0.7                Ph: 2.7      LFT:     TPro: 5.3 / Alb: 2.6 / TBili: <0.2 / DBili: x / AST: 18 / ALT: 28 / AlkPhos: 198   (21 @ 05:24)    LIVER FUNCTIONS - ( 2021 05:24 )  Alb: 2.6 g/dL / Pro: 5.3 g/dL / ALK PHOS: 198 U/L / ALT: 28 U/L / AST: 18 U/L / GGT: x           PT/INR - ( 2021 17:53 )   PT: 15.50 sec;   INR: 1.35 ratio         PTT - ( 2021 17:53 )  PTT:30.1 sec          Culture - Body Fluid with Gram Stain (collected 2021 11:20)  Source: .Body Fluid Pleural Fluid  Gram Stain (2021 23:44):    polymorphonuclear leukocytes seen    No organisms seen    by cytocentrifuge    Culture - Blood (collected 2021 16:30)  Source: .Blood Blood  Preliminary Report (2021 01:01):    No growth to date.    Culture - Blood (collected 2021 16:30)  Source: .Blood Blood  Preliminary Report (2021 01:01):    No growth to date.    < from: CT Chest w/ IV Cont (21 @ 20:08) >  IMPRESSION:    Bilateral pneumonia, complicated on the left by pleural effusion with mild loculation and pleural thickening suspicious for empyema.        < end of copied text >

## 2021-06-08 NOTE — PROGRESS NOTE ADULT - ASSESSMENT
40 M PMH opoid use, peptic ulcer disease presenting for fever, chill, malaise. Outpatient dx with multifocal pneumonia. Used opiates 1 week ago, associated with coffee ground emesis episode at the time and a melena episode last night. In ED, found to be septic on admission (tachycardia 117, WBC 18k, source is pna), although hemodynamically stable and saturating well.     # Left sided loculated pleural effusion d/t Pneumonia with Sepsis Present on Admission  - possibly was related to aspiration event with opiate use as sx began shortly afterwards  - ID eval appreciated:l c/w Unasyn 3mg Q6   - MRSA- negative  - HIV neg (was recently incarcerated)  - low suspicion for TB as per ID - can d/c airborne precaution  - blood cultures x2(06/05) - NGTD  - Legionella Ag negative and Strep Urine Ag pending  - pulm eval - Thoracentesis done: pH 7.18;  - CXR (06/07) - worsening pleural effusion on the left side.   - CT surgery eval  - add on for VATS with possible decortication/open pleurodesis today  - repeat preop TTE  - Follow up repeat CT chest read     # Coffee-Ground Emesis- 1 week ago   # Melena  # H/o Peptic Ulcer Disease  # Acute on Chronic Microcytic Anemia  - No vomiting since admission  - Hb on presentation 6.7  - Hb 8.0 s/p i unit PRBC  - on IV BID Protonix 40  - CBC Q12  - two large bore IV  - tolerating soft diet  - Active T+S  - gentle hydration 75 cc/hr   - GI eval done- continue with PPI BID, EGD after pulmonary optimization, OP Tx of H. pylori  - LDH mildly elevated 291, haptoglobin - 398; hemolysis unlikely  - CT angio abdomen and STAT GI eval if worsening bleeding/HD instability    # Hx opoid abuse  - last used 2 days ago  - Addiction medicine eval done   - continue with methadone 30mg daily  - outpatient methadone clinic Follow up at discr      GI ppx- protonix IV BID  DVT ppx: SCD  DIET- NPO for CTS   Code Status: Full Code  dispo: Acute 40 M PMH opoid use, peptic ulcer disease presenting for fever, chill, malaise. Outpatient dx with multifocal pneumonia. Used opiates 1 week ago, associated with coffee ground emesis episode at the time and a melena episode last night. In ED, found to be septic on admission (tachycardia 117, WBC 18k, source is pna), although hemodynamically stable and saturating well.     # Left sided loculated pleural effusion d/t Pneumonia with Sepsis Present on Admission  - possibly was related to aspiration event with opiate use as sx began shortly afterwards  - ID eval appreciated:l c/w Unasyn 3mg Q6   - MRSA- negative  - HIV neg (was recently incarcerated)  - low suspicion for TB as per ID - can d/c airborne precaution  - blood cultures x2(06/05) - NGTD  - Legionella Ag negative and Strep Urine Ag pending  - pulm eval - Thoracentesis done: pH 7.18;  - CXR (06/07) - worsening pleural effusion on the left side.   - CT surgery eval  - add on for VATS with possible decortication/open pleurodesis today  - repeat preop TTE  - Follow up repeat CT chest read     # Coffee-Ground Emesis- 1 week ago   # Melena  # H/o Peptic Ulcer Disease  # Acute on Chronic Microcytic Anemia  - No vomiting since admission  - Hb on presentation 6.7  - Hb 8.0 s/p 1 unit PRBC  - on IV BID Protonix 40  - CBC Q12  - two large bore IV  - tolerating soft diet  - Active T+S  - gentle hydration 75 cc/hr   - GI eval done- continue with PPI BID, EGD after pulmonary optimization, OP Tx of H. pylori  - LDH mildly elevated 291, haptoglobin - 398; hemolysis unlikely  - CT angio abdomen and STAT GI eval if worsening bleeding/HD instability    # Hx opoid abuse  - last used 2 days ago  - Addiction medicine eval done   - continue with methadone 30mg daily  - outpatient methadone clinic Follow up at discr      GI ppx- protonix IV BID  DVT ppx: SCD  DIET- NPO for CTS   Code Status: Full Code  dispo: Acute

## 2021-06-08 NOTE — PRE-ANESTHESIA EVALUATION ADULT - NSANTHOSAYNRD_GEN_A_CORE
screening not done/No. DARWIN screening performed.  STOP BANG Legend: 0-2 = LOW Risk; 3-4 = INTERMEDIATE Risk; 5-8 = HIGH Risk

## 2021-06-09 ENCOUNTER — RESULT REVIEW (OUTPATIENT)
Age: 41
End: 2021-06-09

## 2021-06-09 LAB
6-ACETYLMORPHINE, UR RESULT: NEGATIVE NG/ML — SIGNIFICANT CHANGE UP
6MAM UR CFM-MCNC: NEGATIVE NG/ML — SIGNIFICANT CHANGE UP
ALBUMIN SERPL ELPH-MCNC: 2.6 G/DL — LOW (ref 3.5–5.2)
ALP SERPL-CCNC: 123 U/L — HIGH (ref 30–115)
ALT FLD-CCNC: 20 U/L — SIGNIFICANT CHANGE UP (ref 0–41)
ANION GAP SERPL CALC-SCNC: 8 MMOL/L — SIGNIFICANT CHANGE UP (ref 7–14)
AST SERPL-CCNC: 20 U/L — SIGNIFICANT CHANGE UP (ref 0–41)
BILIRUB SERPL-MCNC: <0.2 MG/DL — SIGNIFICANT CHANGE UP (ref 0.2–1.2)
BUN SERPL-MCNC: 8 MG/DL — LOW (ref 10–20)
CALCIUM SERPL-MCNC: 7.3 MG/DL — LOW (ref 8.5–10.1)
CHLORIDE SERPL-SCNC: 103 MMOL/L — SIGNIFICANT CHANGE UP (ref 98–110)
CO2 SERPL-SCNC: 26 MMOL/L — SIGNIFICANT CHANGE UP (ref 17–32)
CODEINE UR CFM-MCNC: NEGATIVE NG/ML — SIGNIFICANT CHANGE UP
CODEINE, UR RESULT: NEGATIVE NG/ML — SIGNIFICANT CHANGE UP
CREAT SERPL-MCNC: 0.6 MG/DL — LOW (ref 0.7–1.5)
EDDP UR QL CFM: 7489 NG/ML — SIGNIFICANT CHANGE UP
EDDP, UR RESULT: 7489 NG/ML — SIGNIFICANT CHANGE UP
GLUCOSE SERPL-MCNC: 97 MG/DL — SIGNIFICANT CHANGE UP (ref 70–99)
GRAM STN FLD: SIGNIFICANT CHANGE UP
HCT VFR BLD CALC: 27.4 % — LOW (ref 42–52)
HGB BLD-MCNC: 8.3 G/DL — LOW (ref 14–18)
HYDROCODONE UR QL CFM: NEGATIVE NG/ML — SIGNIFICANT CHANGE UP
HYDROCODONE, UR RESULT: NEGATIVE NG/ML — SIGNIFICANT CHANGE UP
HYDROMORPHONE UR QL CFM: NEGATIVE NG/ML — SIGNIFICANT CHANGE UP
HYDROMORPHONE, UR RESULT: NEGATIVE NG/ML — SIGNIFICANT CHANGE UP
MCHC RBC-ENTMCNC: 21.7 PG — LOW (ref 27–31)
MCHC RBC-ENTMCNC: 30.3 G/DL — LOW (ref 32–37)
MCV RBC AUTO: 71.5 FL — LOW (ref 80–94)
METHADONE IN-HOUSE INTERPRETATION: POSITIVE
METHADONE UR CFM-MCNC: POSITIVE
MORPHINE UR QL CFM: >5000 NG/ML — SIGNIFICANT CHANGE UP
MORPHINE, UR RESULT: >5000 NG/ML — SIGNIFICANT CHANGE UP
NOROXYCODONE (OPIATES), UR RESULT: NEGATIVE NG/ML — SIGNIFICANT CHANGE UP
NOROXYCODONE UR CFM-MCNC: NEGATIVE NG/ML — SIGNIFICANT CHANGE UP
NRBC # BLD: 0 /100 WBCS — SIGNIFICANT CHANGE UP (ref 0–0)
OPIATES IN-HOUSE INTERPRETATION: POSITIVE
OPIATES UR QL CFM: POSITIVE
OXYCODONE (OPIATES), UR RESULT: NEGATIVE NG/ML — SIGNIFICANT CHANGE UP
OXYCODONE UR-MCNC: NEGATIVE NG/ML — SIGNIFICANT CHANGE UP
OXYMORPHONE (OPIATES), UR RESULT: NEGATIVE NG/ML — SIGNIFICANT CHANGE UP
OXYMORPHONE UR CFM-MCNC: NEGATIVE NG/ML — SIGNIFICANT CHANGE UP
PLATELET # BLD AUTO: 616 K/UL — HIGH (ref 130–400)
POTASSIUM SERPL-MCNC: 4.6 MMOL/L — SIGNIFICANT CHANGE UP (ref 3.5–5)
POTASSIUM SERPL-SCNC: 4.6 MMOL/L — SIGNIFICANT CHANGE UP (ref 3.5–5)
PROT SERPL-MCNC: 5 G/DL — LOW (ref 6–8)
RBC # BLD: 3.83 M/UL — LOW (ref 4.7–6.1)
RBC # FLD: 27.9 % — HIGH (ref 11.5–14.5)
SODIUM SERPL-SCNC: 137 MMOL/L — SIGNIFICANT CHANGE UP (ref 135–146)
SPECIMEN SOURCE: SIGNIFICANT CHANGE UP
WBC # BLD: 29.36 K/UL — HIGH (ref 4.8–10.8)
WBC # FLD AUTO: 29.36 K/UL — HIGH (ref 4.8–10.8)

## 2021-06-09 PROCEDURE — XXXXX: CPT

## 2021-06-09 PROCEDURE — 93306 TTE W/DOPPLER COMPLETE: CPT | Mod: 26

## 2021-06-09 PROCEDURE — 32652 THORACOSCOPY REM TOTL CORTEX: CPT

## 2021-06-09 PROCEDURE — 71045 X-RAY EXAM CHEST 1 VIEW: CPT | Mod: 26

## 2021-06-09 RX ORDER — ACETAMINOPHEN 500 MG
650 TABLET ORAL EVERY 6 HOURS
Refills: 0 | Status: DISCONTINUED | OUTPATIENT
Start: 2021-06-09 | End: 2021-06-11

## 2021-06-09 RX ORDER — SUCRALFATE 1 G
1 TABLET ORAL EVERY 6 HOURS
Refills: 0 | Status: DISCONTINUED | OUTPATIENT
Start: 2021-06-09 | End: 2021-06-14

## 2021-06-09 RX ORDER — PANTOPRAZOLE SODIUM 20 MG/1
40 TABLET, DELAYED RELEASE ORAL
Refills: 0 | Status: DISCONTINUED | OUTPATIENT
Start: 2021-06-09 | End: 2021-06-14

## 2021-06-09 RX ORDER — HYDROMORPHONE HYDROCHLORIDE 2 MG/ML
0.5 INJECTION INTRAMUSCULAR; INTRAVENOUS; SUBCUTANEOUS
Refills: 0 | Status: DISCONTINUED | OUTPATIENT
Start: 2021-06-09 | End: 2021-06-09

## 2021-06-09 RX ORDER — CEFEPIME 1 G/1
2000 INJECTION, POWDER, FOR SOLUTION INTRAMUSCULAR; INTRAVENOUS EVERY 8 HOURS
Refills: 0 | Status: DISCONTINUED | OUTPATIENT
Start: 2021-06-09 | End: 2021-06-14

## 2021-06-09 RX ORDER — HYDROMORPHONE HYDROCHLORIDE 2 MG/ML
30 INJECTION INTRAMUSCULAR; INTRAVENOUS; SUBCUTANEOUS
Refills: 0 | Status: DISCONTINUED | OUTPATIENT
Start: 2021-06-09 | End: 2021-06-13

## 2021-06-09 RX ORDER — METRONIDAZOLE 500 MG
500 TABLET ORAL EVERY 8 HOURS
Refills: 0 | Status: DISCONTINUED | OUTPATIENT
Start: 2021-06-09 | End: 2021-06-14

## 2021-06-09 RX ORDER — METHADONE HYDROCHLORIDE 40 MG/1
30 TABLET ORAL DAILY
Refills: 0 | Status: DISCONTINUED | OUTPATIENT
Start: 2021-06-09 | End: 2021-06-09

## 2021-06-09 RX ORDER — POLYETHYLENE GLYCOL 3350 17 G/17G
17 POWDER, FOR SOLUTION ORAL DAILY
Refills: 0 | Status: DISCONTINUED | OUTPATIENT
Start: 2021-06-09 | End: 2021-06-14

## 2021-06-09 RX ORDER — ENOXAPARIN SODIUM 100 MG/ML
40 INJECTION SUBCUTANEOUS DAILY
Refills: 0 | Status: DISCONTINUED | OUTPATIENT
Start: 2021-06-09 | End: 2021-06-14

## 2021-06-09 RX ORDER — SODIUM CHLORIDE 9 MG/ML
1000 INJECTION, SOLUTION INTRAVENOUS
Refills: 0 | Status: DISCONTINUED | OUTPATIENT
Start: 2021-06-09 | End: 2021-06-09

## 2021-06-09 RX ORDER — ONDANSETRON 8 MG/1
4 TABLET, FILM COATED ORAL EVERY 6 HOURS
Refills: 0 | Status: DISCONTINUED | OUTPATIENT
Start: 2021-06-09 | End: 2021-06-14

## 2021-06-09 RX ORDER — HYDROMORPHONE HYDROCHLORIDE 2 MG/ML
1 INJECTION INTRAMUSCULAR; INTRAVENOUS; SUBCUTANEOUS
Refills: 0 | Status: DISCONTINUED | OUTPATIENT
Start: 2021-06-09 | End: 2021-06-09

## 2021-06-09 RX ORDER — SENNA PLUS 8.6 MG/1
2 TABLET ORAL AT BEDTIME
Refills: 0 | Status: DISCONTINUED | OUTPATIENT
Start: 2021-06-09 | End: 2021-06-14

## 2021-06-09 RX ORDER — SODIUM CHLORIDE 9 MG/ML
500 INJECTION INTRAMUSCULAR; INTRAVENOUS; SUBCUTANEOUS ONCE
Refills: 0 | Status: COMPLETED | OUTPATIENT
Start: 2021-06-09 | End: 2021-06-09

## 2021-06-09 RX ORDER — NALOXONE HYDROCHLORIDE 4 MG/.1ML
0.1 SPRAY NASAL
Refills: 0 | Status: DISCONTINUED | OUTPATIENT
Start: 2021-06-09 | End: 2021-06-14

## 2021-06-09 RX ADMIN — CEFEPIME 100 MILLIGRAM(S): 1 INJECTION, POWDER, FOR SOLUTION INTRAMUSCULAR; INTRAVENOUS at 05:08

## 2021-06-09 RX ADMIN — Medication 100 MILLIGRAM(S): at 14:32

## 2021-06-09 RX ADMIN — SODIUM CHLORIDE 1000 MILLILITER(S): 9 INJECTION INTRAMUSCULAR; INTRAVENOUS; SUBCUTANEOUS at 13:30

## 2021-06-09 RX ADMIN — CEFEPIME 100 MILLIGRAM(S): 1 INJECTION, POWDER, FOR SOLUTION INTRAMUSCULAR; INTRAVENOUS at 21:07

## 2021-06-09 RX ADMIN — Medication 1 GRAM(S): at 21:07

## 2021-06-09 RX ADMIN — HYDROMORPHONE HYDROCHLORIDE 1 MILLIGRAM(S): 2 INJECTION INTRAMUSCULAR; INTRAVENOUS; SUBCUTANEOUS at 12:18

## 2021-06-09 RX ADMIN — Medication 100 MILLIGRAM(S): at 22:56

## 2021-06-09 RX ADMIN — Medication 1 GRAM(S): at 00:53

## 2021-06-09 RX ADMIN — HYDROMORPHONE HYDROCHLORIDE 30 MILLILITER(S): 2 INJECTION INTRAMUSCULAR; INTRAVENOUS; SUBCUTANEOUS at 13:30

## 2021-06-09 RX ADMIN — SENNA PLUS 2 TABLET(S): 8.6 TABLET ORAL at 21:07

## 2021-06-09 RX ADMIN — PANTOPRAZOLE SODIUM 40 MILLIGRAM(S): 20 TABLET, DELAYED RELEASE ORAL at 21:07

## 2021-06-09 RX ADMIN — SODIUM CHLORIDE 100 MILLILITER(S): 9 INJECTION, SOLUTION INTRAVENOUS at 11:35

## 2021-06-09 RX ADMIN — PANTOPRAZOLE SODIUM 40 MILLIGRAM(S): 20 TABLET, DELAYED RELEASE ORAL at 05:08

## 2021-06-09 RX ADMIN — HYDROMORPHONE HYDROCHLORIDE 1 MILLIGRAM(S): 2 INJECTION INTRAMUSCULAR; INTRAVENOUS; SUBCUTANEOUS at 12:03

## 2021-06-09 RX ADMIN — Medication 1 GRAM(S): at 05:08

## 2021-06-09 RX ADMIN — Medication 100 MILLIGRAM(S): at 05:08

## 2021-06-09 RX ADMIN — CEFEPIME 100 MILLIGRAM(S): 1 INJECTION, POWDER, FOR SOLUTION INTRAMUSCULAR; INTRAVENOUS at 14:08

## 2021-06-09 RX ADMIN — ENOXAPARIN SODIUM 40 MILLIGRAM(S): 100 INJECTION SUBCUTANEOUS at 13:51

## 2021-06-09 RX ADMIN — SODIUM CHLORIDE 100 MILLILITER(S): 9 INJECTION INTRAMUSCULAR; INTRAVENOUS; SUBCUTANEOUS at 00:14

## 2021-06-09 NOTE — DIETITIAN INITIAL EVALUATION ADULT. - OTHER INFO
Pt p/w worsening cough, dyspnea & chest pain x3d PTA. Hospital course complicated by L sided  loculated pleural effusion d/t PNA w. Sepsis POA--possibly r/t aspiration w. opiate use. Undergoing VATS with possible decortication/open pleurodesis today. Acute on chronic microcytic anemia w. coffee ground emesis & melena PTA w. h/o PUD s/p 1u PRBC since admit. Per GI: EGD after pulm optimization. h/o opoid abuse--addiction medicine c/s complete.

## 2021-06-09 NOTE — PROGRESS NOTE ADULT - SUBJECTIVE AND OBJECTIVE BOX
PAVAN SOUZA  40y Male   [id number]    Procedure/dx:  left lung empyema    Patient is a 40y old  Male who presents with a chief complaint of Pneumonia (09 Jun 2021 07:19)    PAST MEDICAL & SURGICAL HISTORY:  Peptic ulcer disease    No significant past surgical history        Vital Signs Last 24 Hrs  T(C): 37.3 (09 Jun 2021 05:46), Max: 38.3 (08 Jun 2021 19:59)  T(F): 99.1 (09 Jun 2021 05:46), Max: 100.9 (08 Jun 2021 19:59)  HR: 99 (09 Jun 2021 05:46) (65 - 106)  BP: 124/77 (09 Jun 2021 05:46) (124/77 - 136/83)  BP(mean): 94 (09 Jun 2021 05:46) (94 - 94)  RR: 20 (09 Jun 2021 05:46) (18 - 20)  SpO2: 95% (09 Jun 2021 05:46) (94% - 95%)        I&O's Detail      MEDICATIONS  (STANDING):    MEDICATIONS  (PRN):    PHYSICAL EXAM:  GENERAL: NAD,   CHEST/LUNG: decreased breath sounds on the left  HEART: S1 and S2 noted  ABDOMEN: Soft, Nontender, Nondistended;  PERIPHERAL VASCULAR: Extremities are normal in color, size and temperature.     LABS:                         7.1    17.88 )-----------( 577      ( 08 Jun 2021 21:54 )             24.0        06-08    134<L>  |  99  |  8<L>  ----------------------------<  120<H>  4.4   |  28  |  0.7    Ca    8.0<L>      08 Jun 2021 21:54  Phos  2.7     06-07  Mg     1.8     06-08    TPro  5.1<L>  /  Alb  2.5<L>  /  TBili  <0.2  /  DBili  x   /  AST  27  /  ALT  25  /  AlkPhos  147<H>  06-08    LIVER FUNCTIONS - ( 08 Jun 2021 21:54 )  Alb: 2.5 g/dL / Pro: 5.1 g/dL / ALK PHOS: 147 U/L / ALT: 25 U/L / AST: 27 U/L / GGT: x           PT/INR - ( 07 Jun 2021 17:53 )   PT: 15.50 sec;   INR: 1.35 ratio         PTT - ( 07 Jun 2021 17:53 )  PTT:30.1 sec      Culture - Fungal, Body Fluid (collected 07 Jun 2021 11:20)  Source: .Body Fluid Pleural Fluid  Preliminary Report (08 Jun 2021 07:46):    Testing in progress    Culture - Body Fluid with Gram Stain (collected 07 Jun 2021 11:20)  Source: .Body Fluid Pleural Fluid  Gram Stain (07 Jun 2021 23:44):    polymorphonuclear leukocytes seen    No organisms seen    by cytocentrifuge  Preliminary Report (08 Jun 2021 16:59):    No growth    IMAGING:  < from: Xray Chest 1 View- PORTABLE-Routine (Xray Chest 1 View- PORTABLE-Routine in AM.) (06.08.21 @ 06:34) >  Impression:    Stable large left pleural effusion.    < end of copied text >

## 2021-06-09 NOTE — DIETITIAN INITIAL EVALUATION ADULT. - CONTINUE CURRENT NUTRITION CARE PLAN
1. As medically able, provide DASH/TLC, low fiber diet for optimal tolerance. further interventions pending diet adv.

## 2021-06-09 NOTE — PROGRESS NOTE ADULT - SUBJECTIVE AND OBJECTIVE BOX
Patient's procedure postponed until this morning.   Chart reviewed.  Low grade temps overnight.  Vital Signs Last 24 Hrs  T(F): 99.1 (09 Jun 2021 05:46), Max: 100.9 (08 Jun 2021 19:59)  HR: 99 (09 Jun 2021 05:46) (65 - 106)  BP: 124/77 (09 Jun 2021 05:46) (124/77 - 136/83)  SpO2: 95% (09 Jun 2021 05:46) (94% - 95%)  MEDICATIONS  (STANDING):  cefepime   IVPB 2000 milliGRAM(s) IV Intermittent every 8 hours  enoxaparin Injectable 40 milliGRAM(s) SubCutaneous daily  methadone    Tablet 30 milliGRAM(s) Oral daily  metroNIDAZOLE  IVPB      metroNIDAZOLE  IVPB 500 milliGRAM(s) IV Intermittent every 8 hours  pantoprazole  Injectable 40 milliGRAM(s) IV Push two times a day  sodium chloride 0.9%. 1000 milliLiter(s) (100 mL/Hr) IV Continuous <Continuous>  sucralfate 1 Gram(s) Oral every 6 hours    MEDICATIONS  (PRN):  acetaminophen   Tablet .. 650 milliGRAM(s) Oral every 6 hours PRN Temp greater or equal to 38C (100.4F), Mild Pain (1 - 3), Moderate Pain (4 - 6)    Labs:                        7.1    17.88 )-----------( 577      ( 08 Jun 2021 21:54 )             24.0                         7.4    19.23 )-----------( 515      ( 08 Jun 2021 07:33 )             25.9     08 Jun 2021 21:54    134    |  99     |  8      ----------------------------<  120    4.4     |  28     |  0.7    08 Jun 2021 07:33    140    |  100    |  9      ----------------------------<  71     4.0     |  28     |  0.7      Ca    8.0        08 Jun 2021 21:54  Ca    7.5        08 Jun 2021 07:33  Phos  2.7       07 Jun 2021 17:53  Mg     1.8       08 Jun 2021 21:54  Mg     2.0       08 Jun 2021 07:33    TPro  5.1    /  Alb  2.5    /  TBili  <0.2   /  DBili  x      /  AST  27     /  ALT  25     /  AlkPhos  147    08 Jun 2021 21:54  TPro  5.4    /  Alb  2.6    /  TBili  0.2    /  DBili  x      /  AST  25     /  ALT  24     /  AlkPhos  173    08 Jun 2021 07:33    PT/INR - ( 07 Jun 2021 17:53 )   PT: 15.50 sec;   INR: 1.35 ratio         PTT - ( 07 Jun 2021 17:53 )  PTT:30.1 sec      Culture - Fungal, Body Fluid (collected 07 Jun 2021 11:20)  Source: .Body Fluid Pleural Fluid  Preliminary Report (08 Jun 2021 07:46):    Testing in progress    Culture - Body Fluid with Gram Stain (collected 07 Jun 2021 11:20)  Source: .Body Fluid Pleural Fluid  Gram Stain (07 Jun 2021 23:44):    polymorphonuclear leukocytes seen    No organisms seen    by cytocentrifuge  Preliminary Report (08 Jun 2021 16:59):    No growth      A/P:  41 yo unfortunate man with active opioid use, peptic ulcer disease with recent admission for GIB;  now admitted with sepsis due to multifocal pneumonia and empyema likely related to aspiration after drug use    CTS to perform VATS today; possible transfer to CTU after procedure     f/u WBC trend, cultures     IV abx as per ID    IV fluids    ID f/u     pulmonary f/u        anemia-    IV PPI    keep active type and screen; consider transfusing 2 units today     GI f/u      Addiction medicine, CATCH team f/u    started on methadone 30mg daily during this admission     will need arrangements with outpatient methadone clinic prior to DC      close monitoring    ICU eval if any change in status    DVT prophylaxis  Decubitus prevention- all measures as per RN protocol  Please call or text me with any questions or updates

## 2021-06-09 NOTE — DIETITIAN INITIAL EVALUATION ADULT. - OTHER CALCULATIONS
est kcal needs = 1471-3448 kcal/day (MSJ x1.2-1.3); est protein needs = 79-95g/day (1.0-1.1 g/kg CBW); wst fluid needs = per LIP

## 2021-06-09 NOTE — PROGRESS NOTE ADULT - ASSESSMENT
39 y/o male with left lung empyema    Plan:   OR today for VATS  pain control  DVT/GI prophylaxis  SCDs, IS

## 2021-06-10 LAB
ALBUMIN SERPL ELPH-MCNC: 2.5 G/DL — LOW (ref 3.5–5.2)
ALP SERPL-CCNC: 131 U/L — HIGH (ref 30–115)
ALT FLD-CCNC: 19 U/L — SIGNIFICANT CHANGE UP (ref 0–41)
ANION GAP SERPL CALC-SCNC: 11 MMOL/L — SIGNIFICANT CHANGE UP (ref 7–14)
AST SERPL-CCNC: 19 U/L — SIGNIFICANT CHANGE UP (ref 0–41)
BILIRUB SERPL-MCNC: 0.2 MG/DL — SIGNIFICANT CHANGE UP (ref 0.2–1.2)
BUN SERPL-MCNC: 10 MG/DL — SIGNIFICANT CHANGE UP (ref 10–20)
CALCIUM SERPL-MCNC: 7.9 MG/DL — LOW (ref 8.5–10.1)
CHLORIDE SERPL-SCNC: 101 MMOL/L — SIGNIFICANT CHANGE UP (ref 98–110)
CO2 SERPL-SCNC: 26 MMOL/L — SIGNIFICANT CHANGE UP (ref 17–32)
CREAT SERPL-MCNC: 0.7 MG/DL — SIGNIFICANT CHANGE UP (ref 0.7–1.5)
GLUCOSE SERPL-MCNC: 108 MG/DL — HIGH (ref 70–99)
HCT VFR BLD CALC: 27.7 % — LOW (ref 42–52)
HGB BLD-MCNC: 8.4 G/DL — LOW (ref 14–18)
MAGNESIUM SERPL-MCNC: 1.8 MG/DL — SIGNIFICANT CHANGE UP (ref 1.8–2.4)
MCHC RBC-ENTMCNC: 21.5 PG — LOW (ref 27–31)
MCHC RBC-ENTMCNC: 30.3 G/DL — LOW (ref 32–37)
MCV RBC AUTO: 70.8 FL — LOW (ref 80–94)
NRBC # BLD: 0 /100 WBCS — SIGNIFICANT CHANGE UP (ref 0–0)
PLATELET # BLD AUTO: 691 K/UL — HIGH (ref 130–400)
POTASSIUM SERPL-MCNC: 4.6 MMOL/L — SIGNIFICANT CHANGE UP (ref 3.5–5)
POTASSIUM SERPL-SCNC: 4.6 MMOL/L — SIGNIFICANT CHANGE UP (ref 3.5–5)
PROT SERPL-MCNC: 5.4 G/DL — LOW (ref 6–8)
RBC # BLD: 3.91 M/UL — LOW (ref 4.7–6.1)
RBC # FLD: 28 % — HIGH (ref 11.5–14.5)
SODIUM SERPL-SCNC: 138 MMOL/L — SIGNIFICANT CHANGE UP (ref 135–146)
WBC # BLD: 27.5 K/UL — HIGH (ref 4.8–10.8)
WBC # FLD AUTO: 27.5 K/UL — HIGH (ref 4.8–10.8)

## 2021-06-10 PROCEDURE — 93010 ELECTROCARDIOGRAM REPORT: CPT

## 2021-06-10 PROCEDURE — 99233 SBSQ HOSP IP/OBS HIGH 50: CPT

## 2021-06-10 PROCEDURE — 99222 1ST HOSP IP/OBS MODERATE 55: CPT

## 2021-06-10 PROCEDURE — 71045 X-RAY EXAM CHEST 1 VIEW: CPT | Mod: 26

## 2021-06-10 RX ORDER — MAGNESIUM SULFATE 500 MG/ML
2 VIAL (ML) INJECTION ONCE
Refills: 0 | Status: COMPLETED | OUTPATIENT
Start: 2021-06-10 | End: 2021-06-10

## 2021-06-10 RX ORDER — SODIUM CHLORIDE 9 MG/ML
1000 INJECTION INTRAMUSCULAR; INTRAVENOUS; SUBCUTANEOUS
Refills: 0 | Status: DISCONTINUED | OUTPATIENT
Start: 2021-06-10 | End: 2021-06-13

## 2021-06-10 RX ADMIN — Medication 1 GRAM(S): at 17:14

## 2021-06-10 RX ADMIN — POLYETHYLENE GLYCOL 3350 17 GRAM(S): 17 POWDER, FOR SOLUTION ORAL at 11:51

## 2021-06-10 RX ADMIN — Medication 100 MILLIGRAM(S): at 21:12

## 2021-06-10 RX ADMIN — CEFEPIME 100 MILLIGRAM(S): 1 INJECTION, POWDER, FOR SOLUTION INTRAMUSCULAR; INTRAVENOUS at 17:13

## 2021-06-10 RX ADMIN — SODIUM CHLORIDE 50 MILLILITER(S): 9 INJECTION INTRAMUSCULAR; INTRAVENOUS; SUBCUTANEOUS at 08:08

## 2021-06-10 RX ADMIN — ENOXAPARIN SODIUM 40 MILLIGRAM(S): 100 INJECTION SUBCUTANEOUS at 11:51

## 2021-06-10 RX ADMIN — Medication 1 GRAM(S): at 11:51

## 2021-06-10 RX ADMIN — PANTOPRAZOLE SODIUM 40 MILLIGRAM(S): 20 TABLET, DELAYED RELEASE ORAL at 17:14

## 2021-06-10 RX ADMIN — Medication 1 GRAM(S): at 05:19

## 2021-06-10 RX ADMIN — Medication 50 GRAM(S): at 12:15

## 2021-06-10 RX ADMIN — PANTOPRAZOLE SODIUM 40 MILLIGRAM(S): 20 TABLET, DELAYED RELEASE ORAL at 05:19

## 2021-06-10 RX ADMIN — SENNA PLUS 2 TABLET(S): 8.6 TABLET ORAL at 21:12

## 2021-06-10 RX ADMIN — CEFEPIME 100 MILLIGRAM(S): 1 INJECTION, POWDER, FOR SOLUTION INTRAMUSCULAR; INTRAVENOUS at 21:12

## 2021-06-10 RX ADMIN — Medication 1 GRAM(S): at 01:02

## 2021-06-10 RX ADMIN — Medication 100 MILLIGRAM(S): at 14:53

## 2021-06-10 RX ADMIN — CEFEPIME 100 MILLIGRAM(S): 1 INJECTION, POWDER, FOR SOLUTION INTRAMUSCULAR; INTRAVENOUS at 05:20

## 2021-06-10 RX ADMIN — Medication 100 MILLIGRAM(S): at 05:20

## 2021-06-10 NOTE — PROGRESS NOTE ADULT - SUBJECTIVE AND OBJECTIVE BOX
Post Operative Note    PAVAN SOUZA,40yMale  [id number]  06-10-21 @ 00:35  Procedure: Status post left vats with decortication   Post Operative day #1    Patient resting comfortably no complaints        T(C): 36.8 (06-09-21 @ 20:00), Max: 37.9 (06-09-21 @ 05:15)  HR: 95 (06-09-21 @ 23:00) (62 - 105)  BP: 121/82 (06-09-21 @ 23:00) (84/57 - 136/83)  RR: 30 (06-09-21 @ 23:00) (10 - 38)  SpO2: 94% (06-09-21 @ 23:00) (93% - 99%)    06-09-21 @ 07:01  -  06-10-21 @ 00:35  --------------------------------------------------------  IN: 1390 mL / OUT: 2115 mL / NET: -725 mL        General:Alert and oriented times 3, not in acute distress   Heart: Regular rate and rhythm, no rubs , murmurs or gallops  Lungs: Clear to auscultation bilaterally, no wheezes, rales, rhonci appreciated, left 2 chest tubes to suction serosanguinous outpt  Abdomen: Soft , positive bowel sounds, no tenderness, no distention, no peritoneal signs                8.3    29.36 )-----------( 616      ( 06-09 @ 14:35 )             27.4                7.1    17.88 )-----------( 577      ( 06-08 @ 21:54 )             24.0                7.4    19.23 )-----------( 515      ( 06-08 @ 07:33 )             25.9                    137   |  103   |  8                  Ca: 7.3    BMP:   ----------------------------< 97     Mg: x     (06-09-21 @ 14:35)             4.6    |  26    | 0.6                Ph: x        LFT:     TPro: 5.0 / Alb: 2.6 / TBili: <0.2 / DBili: x / AST: 20 / ALT: 20 / AlkPhos: 123   (06-09-21 @ 14:35)                              Culture - Tissue with Gram Stain (collected 09 Jun 2021 08:58)  Source: .Tissue None  Gram Stain (09 Jun 2021 23:59):    No polymorphonuclear cells seen per low power field    No organisms seen per oil power field    Culture - Body Fluid with Gram Stain (collected 09 Jun 2021 08:58)  Source: .Body Fluid None  Gram Stain (09 Jun 2021 23:25):    polymorphonuclear leukocytes seen    No organisms seen    by cytocentrifuge    Culture - Bronchial (collected 09 Jun 2021 08:58)  Source: .Bronchial None  Gram Stain (09 Jun 2021 23:48):    Moderate polymorphonuclear leukocytes per low power field    No squamous epithelial cells per low power field    No organisms seen per oil power field    Culture - Fungal, Body Fluid (collected 07 Jun 2021 11:20)  Source: .Body Fluid Pleural Fluid  Preliminary Report (08 Jun 2021 07:46):    Testing in progress    Culture - Body Fluid with Gram Stain (collected 07 Jun 2021 11:20)  Source: .Body Fluid Pleural Fluid  Gram Stain (07 Jun 2021 23:44):    polymorphonuclear leukocytes seen    No organisms seen    by cytocentrifuge  Preliminary Report (08 Jun 2021 16:59):    No growth

## 2021-06-10 NOTE — PROGRESS NOTE ADULT - ASSESSMENT
ASSESSMENT  40 M PMH opoid use, peptic ulcer disease (last 15 years, found to have bleeding ulcer with Dr. Shah [facility] on endoscopy 2 month ago, recent admission for anemia with endoscopy showing hill grade 4 ) presents with cough, dyspnea,and chest pain    IMPRESSION  #Sepsis on admission (Pulse>90, WBC>12) secondary aspiration pneumonia, rule out empyema   - CT Chest w/ IV Cont (06.05.21 @ 20:08): Bilateral pneumonia, complicated on the left by pleural effusion with mild loculation and pleural thickening suspicious for empyema.  - WBC Count: 18.93 K/uL (06.05.21 @ 16:30)  - s/p VATS 6/9 -- Left Empyema with extensive adhesions/bands  - Legionella Antigen, Urine: Negative . (06.06.21 @ 16:50)  - HIV negative    #PUD  #Possible GI BLeed  #Opioid use    ABX allergies: NKDA    Creatinine, Serum: 0.7 mg/dL (06.06.21 @ 05:24)  Weight (kg): 79.4 (05 Jun 2021 15:30)  CrCl 154    RECOMMENDATIONS  - continue cefepime - increase to 2g q 8 hours  - continue flagyl 500 mg TID  - follow-up urine Strep Ag  - follow-up OR cultures 6/9  - trend WBC     Please call or message on [employer] Teams if with any questions.  Spectra [phone]

## 2021-06-10 NOTE — CONSULT NOTE ADULT - ATTENDING COMMENTS
Patient seen and examined with the GI fellow, assessment and plan above
Events noted, l side pneumonia with highly complicated parapneumonia/ empyema/ CXR worsening, repeat Chest CT, CTsx eval, thora, IV abx ( cefepime/ flagyl till cx), incentive spirometry
40 y.o male patient with PMH of opioid use, PUD admitted for pneumonia complicated by parapneumonic effusion / empyema      # Prolonged QTc / EKG changes  - echo noted  - Now <500ms  - Keep Mg>2 and K>4; monitor electrolytes and correct as needed  - Patient to be on methadone, minimized dose; monitor daily EKGs and adjust dose accordingly; avoid other QT prolonging agents

## 2021-06-10 NOTE — PROGRESS NOTE ADULT - SUBJECTIVE AND OBJECTIVE BOX
PAVAN SOUZA  40y, Male  Allergy: No Known Allergies      LOS  5d    CHIEF COMPLAINT: Pneumonia (10 Vu 2021 15:15)      INTERVAL EVENTS/HPI  - No acute events overnight  - T(F): , Max: 99.1 (06-10-21 @ 17:11)  - Denies any worsening symptoms  - Tolerating medication  - WBC Count: 27.50 (06-10-21 @ 01:30)  WBC Count: 29.36 (06-09-21 @ 14:35)     - Creatinine, Serum: 0.7 (06-10-21 @ 01:30)  Creatinine, Serum: 0.6 (06-09-21 @ 14:35)       ROS  General: Denies rigors, nightsweats  HEENT: Denies headache, rhinorrhea, sore throat, eye pain  CV: Denies CP, palpitations  PULM: Denies wheezing, hemoptysis  GI: Denies hematemesis, hematochezia, melena  : Denies discharge, hematuria  MSK: Denies arthralgias, myalgias  SKIN: Denies rash, lesions  NEURO: Denies paresthesias, weakness  PSYCH: Denies depression, anxiety    VITALS:  T(F): 99.1, Max: 99.1 (06-10-21 @ 17:11)  HR: 101  BP: 133/82  RR: 18Vital Signs Last 24 Hrs  T(C): 37.3 (10 Vu 2021 17:11), Max: 37.3 (10 Vu 2021 17:11)  T(F): 99.1 (10 Vu 2021 17:11), Max: 99.1 (10 Vu 2021 17:11)  HR: 101 (10 Vu 2021 17:11) (78 - 117)  BP: 133/82 (10 Vu 2021 17:11) (103/82 - 138/88)  BP(mean): 99 (10 Vu 2021 12:00) (86 - 102)  RR: 18 (10 Vu 2021 17:11) (12 - 38)  SpO2: 93% (10 Vu 2021 12:00) (92% - 96%)    PHYSICAL EXAM:  Gen: NAD, resting in bed  HEENT: Normocephalic, atraumatic  Neck: supple, no lymphadenopathy  CV: Regular rate & regular rhythm  Lungs: decreased BS at bases, no fremitus  Abdomen: Soft, BS present  Ext: Warm, well perfused  Neuro: non focal, awake  Skin: no rash, no erythema  Lines: no phlebitis    FH: Non-contributory  Social Hx: Non-contributory    TESTS & MEASUREMENTS:                        8.4    27.50 )-----------( 691      ( 10 Vu 2021 01:30 )             27.7     06-10    138  |  101  |  10  ----------------------------<  108<H>  4.6   |  26  |  0.7    Ca    7.9<L>      10 Vu 2021 01:30  Mg     1.8     06-10    TPro  5.4<L>  /  Alb  2.5<L>  /  TBili  0.2  /  DBili  x   /  AST  19  /  ALT  19  /  AlkPhos  131<H>  06-10    eGFR if Non African American: 116 mL/min/1.73M2 (06-10-21 @ 01:30)  eGFR if African American: 135 mL/min/1.73M2 (06-10-21 @ 01:30)    LIVER FUNCTIONS - ( 10 Vu 2021 01:30 )  Alb: 2.5 g/dL / Pro: 5.4 g/dL / ALK PHOS: 131 U/L / ALT: 19 U/L / AST: 19 U/L / GGT: x               Culture - Tissue with Gram Stain (collected 06-09-21 @ 08:58)  Source: .Tissue None  Gram Stain (06-09-21 @ 23:59):    No polymorphonuclear cells seen per low power field    No organisms seen per oil power field  Preliminary Report (06-10-21 @ 15:53):    No growth to date.    Culture - Body Fluid with Gram Stain (collected 06-09-21 @ 08:58)  Source: .Body Fluid None  Gram Stain (06-09-21 @ 23:25):    polymorphonuclear leukocytes seen    No organisms seen    by cytocentrifuge  Preliminary Report (06-10-21 @ 16:52):    No growth    Culture - Bronchial (collected 06-09-21 @ 08:58)  Source: .Bronchial None  Gram Stain (06-09-21 @ 23:48):    Moderate polymorphonuclear leukocytes per low power field    No squamous epithelial cells per low power field    No organisms seen per oil power field  Preliminary Report (06-10-21 @ 15:54):    No growth    Culture - Fungal, Body Fluid (collected 06-07-21 @ 11:20)  Source: .Body Fluid Pleural Fluid  Preliminary Report (06-08-21 @ 07:46):    Testing in progress    Culture - Body Fluid with Gram Stain (collected 06-07-21 @ 11:20)  Source: .Body Fluid Pleural Fluid  Gram Stain (06-07-21 @ 23:44):    polymorphonuclear leukocytes seen    No organisms seen    by cytocentrifuge  Preliminary Report (06-08-21 @ 16:59):    No growth    Culture - Blood (collected 06-05-21 @ 16:30)  Source: .Blood Blood  Preliminary Report (06-07-21 @ 01:01):    No growth to date.    Culture - Blood (collected 06-05-21 @ 16:30)  Source: .Blood Blood  Preliminary Report (06-07-21 @ 01:01):    No growth to date.            INFECTIOUS DISEASES TESTING  COVID-19 PCR: NotDetec (06-08-21 @ 18:12)  MRSA PCR Result.: Negative (06-07-21 @ 06:30)  Legionella Antigen, Urine: Negative (06-06-21 @ 16:50)  Procalcitonin, Serum: 0.63 (06-06-21 @ 05:24)  Rapid HIV-1/2 Antibody: Nonreact (06-06-21 @ 05:24)  COVID-19 PCR: NotDetec (06-05-21 @ 18:25)  COVID-19 PCR: NotDetec (05-20-21 @ 13:35)      INFLAMMATORY MARKERS      RADIOLOGY & ADDITIONAL TESTS:  I have personally reviewed the last available Chest xray  CXR  Xray Chest 1 View- PORTABLE-Urgent:   EXAM:  XR CHEST PORTABLE URGENT 1V          *** ADDENDUM 06/09/2021  ***    Dr. Soliz was made aware of the above findings on 6/9/2021 at 1:40 PM with read back    *** END OF ADDENDUM 06/09/2021  ***        PROCEDURE DATE:  06/09/2021            INTERPRETATION:  Clinical History / Reason for exam: Chest tube placement    Comparison : Chest radiograph 6/8/2021.    Technique/Positioning: Frontal, portable.    Findings:    Support devices: In the interval, 2 left chest tubes have been placed. Telemetry leads overlie patient.    Cardiac/mediastinum/hilum: Difficult to evaluate due to parenchymal lung disease    Lung parenchyma/Pleura: Improved aeration to left lung with residual left mid and lower lung field opacity. There are residual bilateral interstitial opacities. There is a small left apical pneumothorax with an air gap of 6 mm.    Skeleton/soft tissues: New left chest wall subcutaneous emphysema. This is likely postsurgical in nature    Impression:    Small left apical pneumothorax with an air gap of 6 mm.    Improved aeration to the left lung with residual left mid and lower lung field opacity    Residual bilateral interstitial opacities      ***Please see the addendum at the top of this report. It may contain additional important information or changes.****        FELIBERTO FIELDS MD; Attending Radiologist  This document has been electronically signed. Jun 9 2021  1:27PM  Addend:FELIBERTO FIELDS MD; Attending Radiologist  This addendum was electronically signed on: Jun 9 20211:40PM. (06-09-21 @ 13:18)      CT      CARDIOLOGY TESTING  12 Lead ECG:   Ventricular Rate 104 BPM    Atrial Rate 104 BPM    P-R Interval 122 ms    QRS Duration 92 ms    Q-T Interval 344 ms    QTC Calculation(Bazett) 452 ms    P Axis 24 degrees    R Axis 29 degrees    T Axis -17 degrees    Diagnosis Line Sinus tachycardia  T wave abnormality, consider inferior ischemia  T wave abnormality, consider anterior ischemia  Abnormal ECG    Confirmed by Jairon More ([id number]) on 6/10/2021 3:34:30 PM (06-10-21 @ 14:14)  12 Lead ECG:   Ventricular Rate 108 BPM    Atrial Rate 108 BPM    P-R Interval 120 ms    QRS Duration 94 ms    Q-T Interval 494 ms    QTC Calculation(Bazett) 661 ms    P Axis 28 degrees    R Axis 53 degrees    T Axis 41 degrees    Diagnosis Line Sinus tachycardia  ST & T wave abnormality, consider inferolateral ischemia  Prolonged QT  Abnormal ECG    Confirmed by AUGUSTINE MOORE MD ([id number]) on 6/6/2021 9:05:37 AM (06-05-21 @ 15:34)      MEDICATIONS  cefepime   IVPB 2000 IV Intermittent every 8 hours  enoxaparin Injectable 40 SubCutaneous daily  HYDROmorphone PCA (1 mG/mL) 30 PCA Continuous PCA Continuous  metroNIDAZOLE  IVPB 500 IV Intermittent every 8 hours  pantoprazole  Injectable 40 IV Push two times a day  polyethylene glycol 3350 17 Oral daily  senna 2 Oral at bedtime  sodium chloride 0.9%. 1000 IV Continuous <Continuous>  sucralfate 1 Oral every 6 hours      WEIGHT  Weight (kg): 79.4 (06-09-21 @ 18:03)  Creatinine, Serum: 0.7 mg/dL (06-10-21 @ 01:30)      ANTIBIOTICS:  cefepime   IVPB 2000 milliGRAM(s) IV Intermittent every 8 hours  metroNIDAZOLE  IVPB 500 milliGRAM(s) IV Intermittent every 8 hours      All available historical records have been reviewed

## 2021-06-10 NOTE — CONSULT NOTE ADULT - ASSESSMENT
40 y.o male patient with PMH of opioid use, PUD admitted for pneumonia complicated by parapneumonic effusion / empyema    # Prolonged QTc / EKG changes  - Now <500ms  - Keep Mg>2 and K>4; monitor electrolytes and correct as needed  - Patient to be on methadone; monitor daily EKGs; avoid other QT prolonging agents  - ST-T changes present on previous EKGs; once stable from pulmonary and GI standpoints, will complete cardiac work-up   40 y.o male patient with PMH of opioid use, PUD admitted for pneumonia complicated by parapneumonic effusion / empyema    # Prolonged QTc / EKG changes  - Now <500ms  - Keep Mg>2 and K>4; monitor electrolytes and correct as needed  - Patient to be on methadone; monitor daily EKGs and adjust dose accordingly; avoid other QT prolonging agents  - ST-T changes present on previous EKGs; once stable from pulmonary and GI standpoints, will complete cardiac work-up

## 2021-06-10 NOTE — PROGRESS NOTE ADULT - ASSESSMENT
Chest tubes to suction  Daily chest xray  Monitor chest tube out put  Monitor for airleaks  Monitor 02 saturation  Incentive spirometry  encourage ambulation  DVT/GI prophylaxis  Pain management

## 2021-06-10 NOTE — CONSULT NOTE ADULT - SUBJECTIVE AND OBJECTIVE BOX
HPI:  40 M PMH opoid use, peptic ulcer disease (last 15 years, found to have bleeding ulcer with Dr. Shah [facility] on endoscopy 2 month ago, recent admission for anemia with endoscopy showing hill grade 4 ) presents with cough, dyspnea,and chest pain.Symptoms began Jun 2. Patient went to Urgent Care June 3rd,found to have multi-focal pneumonia. Was started on cefuroxime and doxycycline  at time. Patient accompanied by mother who is a nurse; she provided supplemental history. Upon interviewing patient in private, patient admitted to using oxycodone (oral/nasal ingestion) about 1 week ago. He threw up around the same time, and had "black vomitus". He also endorses having a melena episode last night.     Patient denies abdominal pain, diarrhea, constipation, hematochezia, hematuria, weight loss or abdominal pain,  In ED, VSS notable for tachycardia to 117. X-ray shows bilateral pna. ADAM negative.    (05 Jun 2021 20:19)      Patient admitted for management of pneumonia complicated by parapneumonic effusion/empyema; underwent left decortication.  Cardiology consulted for prolonged QTc; patient with no known history of cardiac disease; no history of syncope, palpitations, lightheadedness, at rest or on exertion; he denies chest pain. No family history of cardiac disease or sudden cardiac death    PAST MEDICAL & SURGICAL HISTORY  Peptic ulcer disease    No significant past surgical history        FAMILY HISTORY:  FAMILY HISTORY:      SOCIAL HISTORY:  []smoker  []Alcohol  [X]Drug: opioid use    ALLERGIES:  No Known Allergies      MEDICATIONS:  MEDICATIONS  (STANDING):  cefepime   IVPB 2000 milliGRAM(s) IV Intermittent every 8 hours  enoxaparin Injectable 40 milliGRAM(s) SubCutaneous daily  HYDROmorphone PCA (1 mG/mL) 30 milliLiter(s) PCA Continuous PCA Continuous  metroNIDAZOLE  IVPB 500 milliGRAM(s) IV Intermittent every 8 hours  pantoprazole  Injectable 40 milliGRAM(s) IV Push two times a day  polyethylene glycol 3350 17 Gram(s) Oral daily  senna 2 Tablet(s) Oral at bedtime  sodium chloride 0.9%. 1000 milliLiter(s) (50 mL/Hr) IV Continuous <Continuous>  sucralfate 1 Gram(s) Oral every 6 hours    MEDICATIONS  (PRN):  acetaminophen   Tablet .. 650 milliGRAM(s) Oral every 6 hours PRN Temp greater or equal to 38C (100.4F), Mild Pain (1 - 3), Moderate Pain (4 - 6)  naloxone Injectable 0.1 milliGRAM(s) IV Push every 3 minutes PRN For ANY of the following changes in patient status:  A. RR LESS THAN 10 breaths per minute, B. Oxygen saturation LESS THAN 90%, C. Sedation score of 6  ondansetron Injectable 4 milliGRAM(s) IV Push every 6 hours PRN Nausea      HOME MEDICATIONS:  Home Medications:      VITALS:   T(F): 97.8 (06-10 @ 13:50), Max: 100.9 (06-08 @ 19:59)  HR: 117 (06-10 @ 13:50) (62 - 117)  BP: 138/88 (06-10 @ 13:50) (84/57 - 145/79)  BP(mean): 99 (06-10 @ 12:00) (66 - 102)  RR: 18 (06-10 @ 13:50) (10 - 38)  SpO2: 93% (06-10 @ 12:00) (92% - 100%)    I&O's Summary    09 Jun 2021 07:01  -  10 Vu 2021 07:00  --------------------------------------------------------  IN: 1590 mL / OUT: 2920 mL / NET: -1330 mL    10 Vu 2021 07:01  -  10 Vu 2021 15:18  --------------------------------------------------------  IN: 790 mL / OUT: 357 mL / NET: 433 mL        REVIEW OF SYSTEMS:  CONSTITUTIONAL: No weakness, fevers or chills  EYES: No visual changes  ENT: No vertigo or throat pain   NECK: No pain or stiffness  RESPIRATORY: Cough and shortness of breath as described in HPI  CARDIOVASCULAR: No chest pain or palpitations  GASTROINTESTINAL: Melena and hematemesis as described in HPI  GENITOURINARY: No dysuria, frequency or hematuria  NEUROLOGICAL: No numbness or weakness  SKIN: No itching, no rashes  MSK: No pain    PHYSICAL EXAM:  NEURO: patient is awake , alert and oriented  GEN: Not in acute distress  NECK: no thyroid enlargement, no JVD  LUNGS: Clear to auscultation bilaterally   CARDIOVASCULAR: S1/S2 present,tachycardic  ABD: Soft, non-tender, non-distended, +BS  EXT: No SINDY  SKIN: Intact    LABS:                        8.4    27.50 )-----------( 691      ( 10 Vu 2021 01:30 )             27.7     06-10    138  |  101  |  10  ----------------------------<  108<H>  4.6   |  26  |  0.7    Ca    7.9<L>      10 Vu 2021 01:30  Mg     1.8     06-10    TPro  5.4<L>  /  Alb  2.5<L>  /  TBili  0.2  /  DBili  x   /  AST  19  /  ALT  19  /  AlkPhos  131<H>  06-10              Troponin trend:            RADIOLOGY:  -CXR:  -TTE:  < from: TTE Echo Complete w/o Contrast w/ Doppler (06.09.21 @ 12:50) >  Summary:   1. LV Ejection Fraction by Zavala's Method with a biplane EF of 62 %.   2. Mildly enlarged left atrium.   3. Mild mitral valve regurgitation.   4. Mild tricuspid regurgitation.    < end of copied text >    -CCTA:  -STRESS TEST:  -CATHETERIZATION:    ECG: NSR, ST-T wave changes in V1-V6, QTc 452ms   HPI:  40 M PMH opoid use, peptic ulcer disease (last 15 years, found to have bleeding ulcer with Dr. Shah [facility] on endoscopy 2 month ago, recent admission for anemia with endoscopy showing hill grade 4 ) presents with cough, dyspnea,and chest pain.Symptoms began Jun 2. Patient went to Urgent Care June 3rd,found to have multi-focal pneumonia. Was started on cefuroxime and doxycycline  at time. Patient accompanied by mother who is a nurse; she provided supplemental history. Upon interviewing patient in private, patient admitted to using oxycodone (oral/nasal ingestion) about 1 week ago. He threw up around the same time, and had "black vomitus". He also endorses having a melena episode last night.     Patient denies abdominal pain, diarrhea, constipation, hematochezia, hematuria, weight loss or abdominal pain,  In ED, VSS notable for tachycardia to 117. X-ray shows bilateral pna. ADAM negative.    (05 Jun 2021 20:19)        Patient admitted for management of pneumonia complicated by parapneumonic effusion/empyema; underwent left decortication.  Cardiology consulted for prolonged QTc; patient with no known history of cardiac disease; no history of syncope, palpitations, lightheadedness, at rest or on exertion; he denies chest pain. No family history of cardiac disease or sudden cardiac death    PAST MEDICAL & SURGICAL HISTORY  Peptic ulcer disease    No significant past surgical history        FAMILY HISTORY:  FAMILY HISTORY:      SOCIAL HISTORY:  []smoker  []Alcohol  [X]Drug: opioid use    ALLERGIES:  No Known Allergies      MEDICATIONS:  MEDICATIONS  (STANDING):  cefepime   IVPB 2000 milliGRAM(s) IV Intermittent every 8 hours  enoxaparin Injectable 40 milliGRAM(s) SubCutaneous daily  HYDROmorphone PCA (1 mG/mL) 30 milliLiter(s) PCA Continuous PCA Continuous  metroNIDAZOLE  IVPB 500 milliGRAM(s) IV Intermittent every 8 hours  pantoprazole  Injectable 40 milliGRAM(s) IV Push two times a day  polyethylene glycol 3350 17 Gram(s) Oral daily  senna 2 Tablet(s) Oral at bedtime  sodium chloride 0.9%. 1000 milliLiter(s) (50 mL/Hr) IV Continuous <Continuous>  sucralfate 1 Gram(s) Oral every 6 hours    MEDICATIONS  (PRN):  acetaminophen   Tablet .. 650 milliGRAM(s) Oral every 6 hours PRN Temp greater or equal to 38C (100.4F), Mild Pain (1 - 3), Moderate Pain (4 - 6)  naloxone Injectable 0.1 milliGRAM(s) IV Push every 3 minutes PRN For ANY of the following changes in patient status:  A. RR LESS THAN 10 breaths per minute, B. Oxygen saturation LESS THAN 90%, C. Sedation score of 6  ondansetron Injectable 4 milliGRAM(s) IV Push every 6 hours PRN Nausea      HOME MEDICATIONS:  Home Medications:      VITALS:   T(F): 97.8 (06-10 @ 13:50), Max: 100.9 (06-08 @ 19:59)  HR: 117 (06-10 @ 13:50) (62 - 117)  BP: 138/88 (06-10 @ 13:50) (84/57 - 145/79)  BP(mean): 99 (06-10 @ 12:00) (66 - 102)  RR: 18 (06-10 @ 13:50) (10 - 38)  SpO2: 93% (06-10 @ 12:00) (92% - 100%)    I&O's Summary    09 Jun 2021 07:01  -  10 Vu 2021 07:00  --------------------------------------------------------  IN: 1590 mL / OUT: 2920 mL / NET: -1330 mL    10 Vu 2021 07:01  -  10 Vu 2021 15:18  --------------------------------------------------------  IN: 790 mL / OUT: 357 mL / NET: 433 mL        REVIEW OF SYSTEMS:  CONSTITUTIONAL: No weakness, fevers or chills  EYES: No visual changes  ENT: No vertigo or throat pain   NECK: No pain or stiffness  RESPIRATORY: Cough and shortness of breath as described in HPI  CARDIOVASCULAR: No chest pain or palpitations  GASTROINTESTINAL: Melena and hematemesis as described in HPI  GENITOURINARY: No dysuria, frequency or hematuria  NEUROLOGICAL: No numbness or weakness  SKIN: No itching, no rashes  MSK: No pain    PHYSICAL EXAM:  NEURO: patient is awake , alert and oriented  GEN: Not in acute distress  NECK: no thyroid enlargement, no JVD  LUNGS: Clear to auscultation bilaterally   CARDIOVASCULAR: S1/S2 present,tachycardic  ABD: Soft, non-tender, non-distended, +BS  EXT: No SINDY  SKIN: Intact    LABS:                        8.4    27.50 )-----------( 691      ( 10 Vu 2021 01:30 )             27.7     06-10    138  |  101  |  10  ----------------------------<  108<H>  4.6   |  26  |  0.7    Ca    7.9<L>      10 Vu 2021 01:30  Mg     1.8     06-10    TPro  5.4<L>  /  Alb  2.5<L>  /  TBili  0.2  /  DBili  x   /  AST  19  /  ALT  19  /  AlkPhos  131<H>  06-10              Troponin trend:            RADIOLOGY:  -CXR:  -TTE:  < from: TTE Echo Complete w/o Contrast w/ Doppler (06.09.21 @ 12:50) >  Summary:   1. LV Ejection Fraction by Zavala's Method with a biplane EF of 62 %.   2. Mildly enlarged left atrium.   3. Mild mitral valve regurgitation.   4. Mild tricuspid regurgitation.    < end of copied text >    -CCTA:  -STRESS TEST:  -CATHETERIZATION:    ECG: NSR, ST-T wave changes in V1-V6, QTc 452ms

## 2021-06-10 NOTE — PROGRESS NOTE ADULT - ASSESSMENT
PROBLEMS:  I spent 45 minutes of critical care time examining patient, reviewing vitals, labs, medications, imaging and discussing with the team goals of care to prevent life-threatening in this patient who is at high risk for deterioration or death due to:      Empyema - s/p decortication - keep CT, comtinue IV Abx, PCA for pain  pst thoracotomy ain - PCA  hypomagmesenia - supplemant - 2 gm   Anemia - monitor       Case and plan discussed with CT Surgeons and CTU PAs.  Continue CTU supportive care and ongoing plan of care as per continuing CTU rounds.   Continue DVT/GI prophylaxis.  Incentive Spirometry 10 times an hour.  Continue to advance physical activity as tolerated and continue PT/OT as directed.    PLAN  Neuro: move all 4 extremities. no sensory or motor deficits  Pain management.   acetaminophen   Tablet .. 650 milliGRAM(s) Oral every 6 hours PRN  HYDROmorphone PCA (1 mG/mL) 30 milliLiter(s) PCA Continuous PCA Continuous  ondansetron Injectable 4 milliGRAM(s) IV Push every 6 hours PRN    Pulm: Wean off supplemental oxygen as able. Daily CXR. Encourage coughing, deep breathing and use of incentive spirometry.     Cardio: Monitor telemetry/alarms. Continue supportive care     GI: Continue stool softeners.    pantoprazole  Injectable 40 milliGRAM(s) IV Push two times a day  polyethylene glycol 3350 17 Gram(s) Oral daily  senna 2 Tablet(s) Oral at bedtime  sucralfate 1 Gram(s) Oral every 6 hours    Nutrition: Continue present diet  Endocrine and glucose control:     Renal: monitor urine output, supplement electrolytes as needed,     Vasc: Heparin SC and/or SCDs for DVT prophylaxis  enoxaparin Injectable 40 milliGRAM(s) SubCutaneous daily    ID: Stable, no fever , no chills. Off antibiotics.  cefepime   IVPB 2000 milliGRAM(s) IV Intermittent every 8 hours  metroNIDAZOLE  IVPB 500 milliGRAM(s) IV Intermittent every 8 hours    Tubes: Monitor Chest tube output  Therapy: OOB/ambulate  Disposition: start planing discharge home or placement    Pertinent clinical, laboratory, radiographic, hemodynamic, echocardiographic, respiratory data, microbiologic data and chart were reviewed and analyzed frequently throughout the course of the day and night. GI and DVT prophylaxis, glycemic control, head of bed elevation and skin care issues were addressed.  Patient seen, examined and plan discussed with CT Surgery / CTICU team during rounds.    [ ] The patient remains in critical and unstable condition, and requires ICU care and monitoring  [x ] The patient is improving but requires continued monitoring and adjustment of therapy

## 2021-06-10 NOTE — PROGRESS NOTE ADULT - SUBJECTIVE AND OBJECTIVE BOX
CTU Attending Progress Daily Note     10 Vu 2021 10:15  Admited 06-05-21, Hospital Day 5d  POD# - 1    HPI:  40 M PMH opoid use, peptic ulcer disease (last 15 years, found to have bleeding ulcer with Dr. Alyssa Gu[facility] on endoscopy 2 month ago, recent admission for anemia with endoscopy showing hill grade 4 ) presents with cough, dyspnea,and chest pain.Symptoms began Jun 2. Patient went to Urgent Care June 3rd,found to have multi-focal pneumonia. Was started on cefuroxime and doxycycline  at time. Patient accompanied by mother who is a nurse; she provided supplemental history. Upon interviewing patient in private, patient admitted to using oxycodone (oral/nasal ingestion) about 1 week ago. He threw up around the same time, and had "black vomitus". He also endorses having a melena episode last night.     Patient denies abdominal pain, diarrhea, constipation, hematochezia, hematuria, weight loss or abdominal pain,  In ED, VSS notable for tachycardia to 117. X-ray shows bilateral pna. ADAM negative.    (05 Jun 2021 20:19)    Home Medications:    FAMILY HISTORY:    PAST MEDICAL & SURGICAL HISTORY:  Peptic ulcer disease    No significant past surgical history      Interval event for past 24 hr:  PAVAN SOUZA  40y had no event.   Current Complains:  PAVAN SOUZA has no new complains  Allergies    No Known Allergies    Intolerances      OBJECTIVE:  Vitals last 24 hrs  T(C): 36.8 (06-10-21 @ 08:00), Max: 37.1 (06-09-21 @ 13:00)  T(F): 98.2 (06-10-21 @ 08:00), Max: 98.7 (06-09-21 @ 13:00)  HR: 100 (06-10-21 @ 08:00) (62 - 105)  BP: 121/90 (06-10-21 @ 08:00) (84/57 - 124/84)  ABP: 92/87 (06-09-21 @ 16:00) (83/55 - 126/67)  ABP(mean): 90 (06-09-21 @ 16:00) (65 - 90)  RR: 37 (06-10-21 @ 08:00) (10 - 38)  SpO2: 92% (06-10-21 @ 08:00) (92% - 99%)      06-09-21 @ 07:01  -  06-10-21 @ 07:00  --------------------------------------------------------  IN:    IV PiggyBack: 300 mL    Lactated Ringers: 500 mL    Oral Fluid: 290 mL    Sodium Chloride 0.9% Bolus: 500 mL  Total IN: 1590 mL    OUT:    Chest Tube (mL): 300 mL    Chest Tube (mL): 110 mL    Indwelling Catheter - Urethral (mL): 1810 mL    Voided (mL): 700 mL  Total OUT: 2920 mL    Total NET: -1330 mL             CAPILLARY BLOOD GLUCOSE        LABS:                          8.4    27.50 )-----------( 691      ( 10 Vu 2021 01:30 )             27.7     Hemoglobin: 8.4 g/dL (06-10 @ 01:30)  Hemoglobin: 8.3 g/dL (06-09 @ 14:35)  Hemoglobin: 7.1 g/dL (06-08 @ 21:54)  Hemoglobin: 7.4 g/dL (06-08 @ 07:33)  Hemoglobin: 7.5 g/dL (06-07 @ 17:53)    06-10    138  |  101  |  10  ----------------------------<  108<H>  4.6   |  26  |  0.7    Ca    7.9<L>      10 Vu 2021 01:30  Mg     1.8     06-08    TPro  5.4<L>  /  Alb  2.5<L>  /  TBili  0.2  /  DBili  x   /  AST  19  /  ALT  19  /  AlkPhos  131<H>  06-10    Creatinine, Serum: 0.7 mg/dL (06-10 @ 01:30)  Creatinine, Serum: 0.6 mg/dL (06-09 @ 14:35)  Creatinine, Serum: 0.7 mg/dL (06-08 @ 21:54)  Creatinine, Serum: 0.7 mg/dL (06-08 @ 07:33)  Creatinine, Serum: 0.7 mg/dL (06-07 @ 17:53)  Creatinine, Serum: 0.8 mg/dL (06-07 @ 07:04)          Culture - Tissue with Gram Stain (collected 09 Jun 2021 08:58)  Source: .Tissue None  Gram Stain (09 Jun 2021 23:59):    No polymorphonuclear cells seen per low power field    No organisms seen per oil power field    Culture - Body Fluid with Gram Stain (collected 09 Jun 2021 08:58)  Source: .Body Fluid None  Gram Stain (09 Jun 2021 23:25):    polymorphonuclear leukocytes seen    No organisms seen    by cytocentrifuge    Culture - Bronchial (collected 09 Jun 2021 08:58)  Source: .Bronchial None  Gram Stain (09 Jun 2021 23:48):    Moderate polymorphonuclear leukocytes per low power field    No squamous epithelial cells per low power field    No organisms seen per oil power field    Culture - Fungal, Body Fluid (collected 07 Jun 2021 11:20)  Source: .Body Fluid Pleural Fluid  Preliminary Report (08 Jun 2021 07:46):    Testing in progress    Culture - Body Fluid with Gram Stain (collected 07 Jun 2021 11:20)  Source: .Body Fluid Pleural Fluid  Gram Stain (07 Jun 2021 23:44):    polymorphonuclear leukocytes seen    No organisms seen    by cytocentrifuge  Preliminary Report (08 Jun 2021 16:59):    No growth      HOSPITAL MEDICATIONS:  MEDICATIONS  (STANDING):  cefepime   IVPB 2000 milliGRAM(s) IV Intermittent every 8 hours  enoxaparin Injectable 40 milliGRAM(s) SubCutaneous daily  HYDROmorphone PCA (1 mG/mL) 30 milliLiter(s) PCA Continuous PCA Continuous  metroNIDAZOLE  IVPB 500 milliGRAM(s) IV Intermittent every 8 hours  pantoprazole  Injectable 40 milliGRAM(s) IV Push two times a day  polyethylene glycol 3350 17 Gram(s) Oral daily  senna 2 Tablet(s) Oral at bedtime  sodium chloride 0.9%. 1000 milliLiter(s) (50 mL/Hr) IV Continuous <Continuous>  sucralfate 1 Gram(s) Oral every 6 hours    MEDICATIONS  (PRN):  acetaminophen   Tablet .. 650 milliGRAM(s) Oral every 6 hours PRN Temp greater or equal to 38C (100.4F), Mild Pain (1 - 3), Moderate Pain (4 - 6)  naloxone Injectable 0.1 milliGRAM(s) IV Push every 3 minutes PRN For ANY of the following changes in patient status:  A. RR LESS THAN 10 breaths per minute, B. Oxygen saturation LESS THAN 90%, C. Sedation score of 6  ondansetron Injectable 4 milliGRAM(s) IV Push every 6 hours PRN Nausea      REVIEW OF SYSTEMS:  CONSTITUTIONAL: [X] all negative; [ ] weakness, [ ] fevers, [ ] chills  EYES/ENT: [X] all negative; [ ] visual changes, [ ] vertigo, [ ] throat pain, [ ] eye pain  NECK: [X] all negative; [ ] pain, [ ] stiffness  RESPIRATORY: [ ] all negative, [ x] cough, [ ] wheezing, [ ] hemoptysis, [ ] shortness of breath, [x  ] chest pain  CARDIOVASCULAR: [X] all negative; [ ] anginal chest pain, [ ] palpitations, [ ] orthopnea  GASTROINTESTINAL: [X] all negative; [ ]abdominal pain, [ ] nausea, [ ] vomiting, [ ] hematemesis, [ ] diarrhea, [ ] constipation, [ ] melena, [ ] hematochezia.  GENITOURINARY: [X] all negative; [ ] dysuria, [ ] frequency, [ ] hematuria  NEUROLOGICAL: [X] all negative; [ ] numbness, [ ] weakness, [ ] paresthesias  MUSCULOSKELETAL: [X] all negative, [ ] joint pain, [ ] joint swelling, [ ] joint redness, [ ] bone pain  SKIN: [X] all negative; [ ] itching, [ ] burning, [ ] rashes, [ ] lesions   All other review of systems is negative unless indicated above.    [  ] Unable to assess ROS because     PHYSICAL EXAM:          CONSTITUTIONAL: Well-developed; well-nourished; in no acute distress.   	SKIN: warm, dry, no rashes or lesions  	HEENT: Atraumatic. Normocephalic. PERRL. Moist membranes, no conjunctival injection, sclera clear  	NECK: Supple; non tender.  No JVD. No lymphadenopathy.  	CARD: Normal S1, S2. Rate and Rhythm are regular. No murmurs.  	RESP: Good air entry bilaterally, no wheezes, + rales + rhonchi.  	ABD: Soft, not tender, not distended, no CVA tenderness, no rebound no guarding, bowel sounds present  	EXT: Normal ROM.  No clubbing, no cyanosis, no pedal edema, no calf pain b/l, Peripheral pulses intact.  	LYMPH: No acute adenopathy.  	NEURO: Alert, awake, motor 5/5 R, 5/5 L, sensation intact bilat, CN 2-12 intact,          PSYCH: Cooperative, appropriate. Alert & oriented x 3    RADIOLOGY:  X Reviewed and interpreted by me  CxR from 06-10-21 shows mild congestion, no pneumothorax, left effusion, no cardiomegaly,    Chest Tubes in place,     ECG:

## 2021-06-11 LAB
ANION GAP SERPL CALC-SCNC: 7 MMOL/L — SIGNIFICANT CHANGE UP (ref 7–14)
ANION GAP SERPL CALC-SCNC: 8 MMOL/L — SIGNIFICANT CHANGE UP (ref 7–14)
BUN SERPL-MCNC: 11 MG/DL — SIGNIFICANT CHANGE UP (ref 10–20)
BUN SERPL-MCNC: 11 MG/DL — SIGNIFICANT CHANGE UP (ref 10–20)
CALCIUM SERPL-MCNC: 7.8 MG/DL — LOW (ref 8.5–10.1)
CALCIUM SERPL-MCNC: 7.8 MG/DL — LOW (ref 8.5–10.1)
CHLORIDE SERPL-SCNC: 98 MMOL/L — SIGNIFICANT CHANGE UP (ref 98–110)
CHLORIDE SERPL-SCNC: 98 MMOL/L — SIGNIFICANT CHANGE UP (ref 98–110)
CO2 SERPL-SCNC: 29 MMOL/L — SIGNIFICANT CHANGE UP (ref 17–32)
CO2 SERPL-SCNC: 30 MMOL/L — SIGNIFICANT CHANGE UP (ref 17–32)
CREAT SERPL-MCNC: 0.6 MG/DL — LOW (ref 0.7–1.5)
CREAT SERPL-MCNC: 0.7 MG/DL — SIGNIFICANT CHANGE UP (ref 0.7–1.5)
CULTURE RESULTS: NO GROWTH — SIGNIFICANT CHANGE UP
CULTURE RESULTS: SIGNIFICANT CHANGE UP
CULTURE RESULTS: SIGNIFICANT CHANGE UP
GLUCOSE SERPL-MCNC: 84 MG/DL — SIGNIFICANT CHANGE UP (ref 70–99)
GLUCOSE SERPL-MCNC: 85 MG/DL — SIGNIFICANT CHANGE UP (ref 70–99)
HCT VFR BLD CALC: 27.3 % — LOW (ref 42–52)
HCT VFR BLD CALC: 28 % — LOW (ref 42–52)
HGB BLD-MCNC: 7.9 G/DL — LOW (ref 14–18)
HGB BLD-MCNC: 8.2 G/DL — LOW (ref 14–18)
MAGNESIUM SERPL-MCNC: 1.8 MG/DL — SIGNIFICANT CHANGE UP (ref 1.8–2.4)
MAGNESIUM SERPL-MCNC: 1.9 MG/DL — SIGNIFICANT CHANGE UP (ref 1.8–2.4)
MCHC RBC-ENTMCNC: 21.1 PG — LOW (ref 27–31)
MCHC RBC-ENTMCNC: 21.2 PG — LOW (ref 27–31)
MCHC RBC-ENTMCNC: 28.9 G/DL — LOW (ref 32–37)
MCHC RBC-ENTMCNC: 29.3 G/DL — LOW (ref 32–37)
MCV RBC AUTO: 72.4 FL — LOW (ref 80–94)
MCV RBC AUTO: 72.8 FL — LOW (ref 80–94)
NRBC # BLD: 0 /100 WBCS — SIGNIFICANT CHANGE UP (ref 0–0)
NRBC # BLD: 0 /100 WBCS — SIGNIFICANT CHANGE UP (ref 0–0)
PHOSPHATE SERPL-MCNC: 3 MG/DL — SIGNIFICANT CHANGE UP (ref 2.1–4.9)
PLATELET # BLD AUTO: 740 K/UL — HIGH (ref 130–400)
PLATELET # BLD AUTO: 838 K/UL — HIGH (ref 130–400)
POTASSIUM SERPL-MCNC: 4.9 MMOL/L — SIGNIFICANT CHANGE UP (ref 3.5–5)
POTASSIUM SERPL-MCNC: 5.2 MMOL/L — HIGH (ref 3.5–5)
POTASSIUM SERPL-SCNC: 4.9 MMOL/L — SIGNIFICANT CHANGE UP (ref 3.5–5)
POTASSIUM SERPL-SCNC: 5.2 MMOL/L — HIGH (ref 3.5–5)
RBC # BLD: 3.75 M/UL — LOW (ref 4.7–6.1)
RBC # BLD: 3.87 M/UL — LOW (ref 4.7–6.1)
RBC # FLD: 28.3 % — HIGH (ref 11.5–14.5)
RBC # FLD: 28.5 % — HIGH (ref 11.5–14.5)
SODIUM SERPL-SCNC: 134 MMOL/L — LOW (ref 135–146)
SODIUM SERPL-SCNC: 136 MMOL/L — SIGNIFICANT CHANGE UP (ref 135–146)
SPECIMEN SOURCE: SIGNIFICANT CHANGE UP
SURGICAL PATHOLOGY STUDY: SIGNIFICANT CHANGE UP
WBC # BLD: 14.7 K/UL — HIGH (ref 4.8–10.8)
WBC # BLD: 17 K/UL — HIGH (ref 4.8–10.8)
WBC # FLD AUTO: 14.7 K/UL — HIGH (ref 4.8–10.8)
WBC # FLD AUTO: 17 K/UL — HIGH (ref 4.8–10.8)

## 2021-06-11 PROCEDURE — 99232 SBSQ HOSP IP/OBS MODERATE 35: CPT

## 2021-06-11 PROCEDURE — 71045 X-RAY EXAM CHEST 1 VIEW: CPT | Mod: 26

## 2021-06-11 PROCEDURE — 93010 ELECTROCARDIOGRAM REPORT: CPT

## 2021-06-11 RX ORDER — ACETAMINOPHEN 500 MG
650 TABLET ORAL EVERY 6 HOURS
Refills: 0 | Status: DISCONTINUED | OUTPATIENT
Start: 2021-06-11 | End: 2021-06-13

## 2021-06-11 RX ORDER — GABAPENTIN 400 MG/1
300 CAPSULE ORAL THREE TIMES A DAY
Refills: 0 | Status: DISCONTINUED | OUTPATIENT
Start: 2021-06-11 | End: 2021-06-13

## 2021-06-11 RX ORDER — KETOROLAC TROMETHAMINE 30 MG/ML
15 SYRINGE (ML) INJECTION EVERY 6 HOURS
Refills: 0 | Status: DISCONTINUED | OUTPATIENT
Start: 2021-06-11 | End: 2021-06-13

## 2021-06-11 RX ORDER — METHOCARBAMOL 500 MG/1
500 TABLET, FILM COATED ORAL EVERY 8 HOURS
Refills: 0 | Status: DISCONTINUED | OUTPATIENT
Start: 2021-06-11 | End: 2021-06-14

## 2021-06-11 RX ADMIN — GABAPENTIN 300 MILLIGRAM(S): 400 CAPSULE ORAL at 22:27

## 2021-06-11 RX ADMIN — Medication 1 GRAM(S): at 01:46

## 2021-06-11 RX ADMIN — Medication 1 GRAM(S): at 11:19

## 2021-06-11 RX ADMIN — Medication 1 GRAM(S): at 17:08

## 2021-06-11 RX ADMIN — CEFEPIME 100 MILLIGRAM(S): 1 INJECTION, POWDER, FOR SOLUTION INTRAMUSCULAR; INTRAVENOUS at 05:05

## 2021-06-11 RX ADMIN — Medication 100 MILLIGRAM(S): at 22:27

## 2021-06-11 RX ADMIN — Medication 1 GRAM(S): at 05:05

## 2021-06-11 RX ADMIN — PANTOPRAZOLE SODIUM 40 MILLIGRAM(S): 20 TABLET, DELAYED RELEASE ORAL at 17:09

## 2021-06-11 RX ADMIN — CEFEPIME 100 MILLIGRAM(S): 1 INJECTION, POWDER, FOR SOLUTION INTRAMUSCULAR; INTRAVENOUS at 14:45

## 2021-06-11 RX ADMIN — Medication 100 MILLIGRAM(S): at 14:44

## 2021-06-11 RX ADMIN — PANTOPRAZOLE SODIUM 40 MILLIGRAM(S): 20 TABLET, DELAYED RELEASE ORAL at 05:05

## 2021-06-11 RX ADMIN — SENNA PLUS 2 TABLET(S): 8.6 TABLET ORAL at 22:26

## 2021-06-11 RX ADMIN — CEFEPIME 100 MILLIGRAM(S): 1 INJECTION, POWDER, FOR SOLUTION INTRAMUSCULAR; INTRAVENOUS at 22:27

## 2021-06-11 RX ADMIN — ENOXAPARIN SODIUM 40 MILLIGRAM(S): 100 INJECTION SUBCUTANEOUS at 11:20

## 2021-06-11 RX ADMIN — METHOCARBAMOL 500 MILLIGRAM(S): 500 TABLET, FILM COATED ORAL at 22:27

## 2021-06-11 RX ADMIN — SODIUM CHLORIDE 50 MILLILITER(S): 9 INJECTION INTRAMUSCULAR; INTRAVENOUS; SUBCUTANEOUS at 19:52

## 2021-06-11 RX ADMIN — Medication 100 MILLIGRAM(S): at 05:05

## 2021-06-11 RX ADMIN — POLYETHYLENE GLYCOL 3350 17 GRAM(S): 17 POWDER, FOR SOLUTION ORAL at 11:20

## 2021-06-11 NOTE — PROGRESS NOTE ADULT - ASSESSMENT
ASSESSMENT  40 M PMH opoid use, peptic ulcer disease (last 15 years, found to have bleeding ulcer with Dr. [facility] on endoscopy 2 month ago, recent admission for anemia with endoscopy showing hill grade 4 ) presents with cough, dyspnea,and chest pain    IMPRESSION  #Sepsis on admission (Pulse>90, WBC>12) secondary aspiration pneumonia, rule out empyema   - CT Chest w/ IV Cont (06.05.21 @ 20:08): Bilateral pneumonia, complicated on the left by pleural effusion with mild loculation and pleural thickening suspicious for empyema.  - WBC Count: 18.93 K/uL (06.05.21 @ 16:30)  - s/p VATS 6/9 -- Left Empyema with extensive adhesions/bands  - Legionella Antigen, Urine: Negative . (06.06.21 @ 16:50)  - HIV negative    #PUD  #Possible GI BLeed  #Opioid use    ABX allergies: NKDA    Creatinine, Serum: 0.7 mg/dL (06.06.21 @ 05:24)  Weight (kg): 79.4 (05 Jun 2021 15:30)  CrCl 154    RECOMMENDATIONS  - continue cefepime - increase to 2g q 8 hours  - continue flagyl 500 mg TID  - follow-up urine Strep Ag  - follow-up OR cultures 6/9 -- if no further growth over weeked, will likely change cefepime to ceftriaxone 2g daily  - trend WBC   - will need midline antibiotics on discharge     Please call or message on [employer] Teams if with any questions.  Spectra [phone]

## 2021-06-11 NOTE — PROGRESS NOTE ADULT - SUBJECTIVE AND OBJECTIVE BOX
OVERNIGHT EVENTS: events noted, on RA, intermittent l sided CP    Vital Signs Last 24 Hrs  T(C): 36.7 (11 Jun 2021 04:53), Max: 37.3 (10 Vu 2021 17:11)  T(F): 98 (11 Jun 2021 04:53), Max: 99.1 (10 Vu 2021 17:11)  HR: 95 (11 Jun 2021 04:53) (85 - 117)  BP: 131/93 (11 Jun 2021 04:53) (115/74 - 138/88)  BP(mean): 99 (10 Vu 2021 12:00) (89 - 102)  RR: 18 (11 Jun 2021 04:53) (14 - 33)  SpO2: 93% (10 Vu 2021 12:00) (92% - 94%)    PHYSICAL EXAMINATION:    GENERAL: comfortable    HEENT: Head is normocephalic and atraumatic.     NECK: Supple.    LUNGS: dec bs l base    HEART: Regular rate and rhythm without murmur.    ABDOMEN: Soft, nontender, and nondistended.      EXTREMITIES: Without any cyanosis, clubbing, rash, lesions or edema.    NEUROLOGIC: Grossly intact.    SKIN: No ulceration or induration present.      LABS:                        7.9    17.00 )-----------( 740      ( 11 Jun 2021 05:39 )             27.3     06-10    138  |  101  |  10  ----------------------------<  108<H>  4.6   |  26  |  0.7    Ca    7.9<L>      10 Vu 2021 01:30  Mg     1.8     06-10    TPro  5.4<L>  /  Alb  2.5<L>  /  TBili  0.2  /  DBili  x   /  AST  19  /  ALT  19  /  AlkPhos  131<H>  06-10                          06-09-21 @ 07:01  -  06-10-21 @ 07:00  --------------------------------------------------------  IN: 1590 mL / OUT: 2920 mL / NET: -1330 mL    06-10-21 @ 07:01  -  06-11-21 @ 06:57  --------------------------------------------------------  IN: 1910 mL / OUT: 1888 mL / NET: 22 mL        MICROBIOLOGY:  Culture Results:   No growth (06-09 @ 08:58)  Culture Results:   No growth (06-09 @ 08:58)  Culture Results:   No growth to date. (06-09 @ 08:58)  Culture Results:   No growth (06-07 @ 11:20)  Culture Results:   Testing in progress (06-07 @ 11:20)      MEDICATIONS  (STANDING):  cefepime   IVPB 2000 milliGRAM(s) IV Intermittent every 8 hours  enoxaparin Injectable 40 milliGRAM(s) SubCutaneous daily  HYDROmorphone PCA (1 mG/mL) 30 milliLiter(s) PCA Continuous PCA Continuous  metroNIDAZOLE  IVPB 500 milliGRAM(s) IV Intermittent every 8 hours  pantoprazole  Injectable 40 milliGRAM(s) IV Push two times a day  polyethylene glycol 3350 17 Gram(s) Oral daily  senna 2 Tablet(s) Oral at bedtime  sodium chloride 0.9%. 1000 milliLiter(s) (50 mL/Hr) IV Continuous <Continuous>  sucralfate 1 Gram(s) Oral every 6 hours    MEDICATIONS  (PRN):  acetaminophen   Tablet .. 650 milliGRAM(s) Oral every 6 hours PRN Temp greater or equal to 38C (100.4F), Mild Pain (1 - 3), Moderate Pain (4 - 6)  naloxone Injectable 0.1 milliGRAM(s) IV Push every 3 minutes PRN For ANY of the following changes in patient status:  A. RR LESS THAN 10 breaths per minute, B. Oxygen saturation LESS THAN 90%, C. Sedation score of 6  ondansetron Injectable 4 milliGRAM(s) IV Push every 6 hours PRN Nausea      RADIOLOGY & ADDITIONAL STUDIES:

## 2021-06-11 NOTE — PROGRESS NOTE ADULT - SUBJECTIVE AND OBJECTIVE BOX
GENERAL SURGERY PROGRESS NOTE    Patient: PAVAN SOUZA , 40y ([birth year])Male   MRN: [id number]  Location: [address]  Visit: 06-05-21 Inpatient  Date: 06-11-21 @ 10:02    Hospital Day #: 7  Post-Op Day #:  2    Procedure/Dx/Injuries: s/p left VATS with decortication    Events of past 24 hours: no acute events over night    PAST MEDICAL & SURGICAL HISTORY:  Peptic ulcer disease    No significant past surgical history        Vitals:   T(F): 98.2 (06-11-21 @ 09:18), Max: 99.1 (06-10-21 @ 17:11)  HR: 104 (06-11-21 @ 09:18)  BP: 148/87 (06-11-21 @ 09:18)  RR: 18 (06-11-21 @ 09:18)  SpO2: 93% (06-10-21 @ 12:00)      Diet, Regular      Fluids:     I & O's:    06-10-21 @ 07:01  -  06-11-21 @ 07:00  --------------------------------------------------------  IN:    IV PiggyBack: 100 mL    Oral Fluid: 960 mL    sodium chloride 0.9%: 850 mL  Total IN: 1910 mL    OUT:    Chest Tube (mL): 60 mL    Chest Tube (mL): 28 mL    Voided (mL): 1800 mL  Total OUT: 1888 mL    Total NET: 22 mL        Bowel Movement: : [] YES [x] NO  Flatus: : [] YES [x] NO    Physical Exam:  General: AAOx 3. NAD  Heart: S1 and S2 noted  Lungs: Clear to auscultation bilaterally. 2 CT to suction, -airleak  Abdomen: Soft Non tender, Non distended.   Extremities: Normal in color and temperature. Motor and sensory grosly intact  Wound: No bleeding or discharge noted from the incision site, no hematoma noted        MEDICATIONS  (STANDING):  cefepime   IVPB 2000 milliGRAM(s) IV Intermittent every 8 hours  enoxaparin Injectable 40 milliGRAM(s) SubCutaneous daily  HYDROmorphone PCA (1 mG/mL) 30 milliLiter(s) PCA Continuous PCA Continuous  metroNIDAZOLE  IVPB 500 milliGRAM(s) IV Intermittent every 8 hours  pantoprazole  Injectable 40 milliGRAM(s) IV Push two times a day  polyethylene glycol 3350 17 Gram(s) Oral daily  senna 2 Tablet(s) Oral at bedtime  sodium chloride 0.9%. 1000 milliLiter(s) (50 mL/Hr) IV Continuous <Continuous>  sucralfate 1 Gram(s) Oral every 6 hours    MEDICATIONS  (PRN):  acetaminophen   Tablet .. 650 milliGRAM(s) Oral every 6 hours PRN Temp greater or equal to 38C (100.4F), Mild Pain (1 - 3), Moderate Pain (4 - 6)  naloxone Injectable 0.1 milliGRAM(s) IV Push every 3 minutes PRN For ANY of the following changes in patient status:  A. RR LESS THAN 10 breaths per minute, B. Oxygen saturation LESS THAN 90%, C. Sedation score of 6  ondansetron Injectable 4 milliGRAM(s) IV Push every 6 hours PRN Nausea      DVT PROPHYLAXIS: enoxaparin Injectable 40 milliGRAM(s) SubCutaneous daily    GI PROPHYLAXIS: pantoprazole  Injectable 40 milliGRAM(s) IV Push two times a day    ANTICOAGULATION:   ANTIBIOTICS:  cefepime   IVPB 2000 milliGRAM(s)  metroNIDAZOLE  IVPB 500 milliGRAM(s)            LAB/STUDIES:  Labs:  CAPILLARY BLOOD GLUCOSE                              7.9    17.00 )-----------( 740      ( 11 Jun 2021 05:39 )             27.3         06-11    136  |  98  |  11  ----------------------------<  84  4.9   |  30  |  0.7      Calcium, Total Serum: 7.8 mg/dL (06-11-21 @ 05:39)      LFTs:             5.4  | 0.2  | 19       ------------------[131     ( 10 Vu 2021 01:30 )  2.5  | x    | 19          Lipase:x      Amylase:x             Coags:        Serum Pro-Brain Natriuretic Peptide: 59 pg/mL (06-05-21 @ 16:30)          Culture - Tissue with Gram Stain (collected 09 Jun 2021 08:58)  Source: .Tissue None  Gram Stain (09 Jun 2021 23:59):    No polymorphonuclear cells seen per low power field    No organisms seen per oil power field  Preliminary Report (10 Vu 2021 15:53):    No growth to date.    Culture - Body Fluid with Gram Stain (collected 09 Jun 2021 08:58)  Source: .Body Fluid None  Gram Stain (09 Jun 2021 23:25):    polymorphonuclear leukocytes seen    No organisms seen    by cytocentrifuge  Preliminary Report (10 Vu 2021 16:52):    No growth    Culture - Bronchial (collected 09 Jun 2021 08:58)  Source: .Bronchial None  Gram Stain (09 Jun 2021 23:48):    Moderate polymorphonuclear leukocytes per low power field    No squamous epithelial cells per low power field    No organisms seen per oil power field  Preliminary Report (10 Vu 2021 15:54):    No growth              IMAGING:  < from: Xray Chest 1 View- PORTABLE-Routine (Xray Chest 1 View- PORTABLE-Routine in AM.) (06.10.21 @ 06:03) >    Impression:     small left apical pneumothorax. Air gap 10 mm.    Stable bilateral opacities.    < end of copied text >      ACCESS/ DEVICES:  [ x] Peripheral IV  [ ] Central Venous Line	[ ] R	[ ] L	[ ] IJ	[ ] Fem	[ ] SC	Placed:   [ ] Arterial Line		[ ] R	[ ] L	[ ] Fem	[ ] Rad	[ ] Ax	Placed:   [ ] PICC:					[ ] Mediport  [ ] Urinary Catheter,  Date Placed:   [ ] Chest tube: [ ] Right, [ ] Left  [ ] YUVAL/Keith Drains      ASSESSMENT:  Mr. Souza is a 40 years old male with diagnosis of left empyema. s/p left VATS with decortication    Plan:  2 CT to suction  Daily AM cxr  follow up Chest tube output  monitor for airleak  monitor O2 sat  incentive spirometry  pain control  SCDs  DVT/GI prophylaxis  Passed TOV  f/u urin strep Ag.      Lines/Tubes: PIV, Midline, Central Line, A-Line, Chest tubes, Keith/YUVAL drains, Santillan Catheter.

## 2021-06-11 NOTE — PROGRESS NOTE ADULT - ASSESSMENT
Impression    l side pneumonia/ complicated parapneumonic effusion/ empyema SP decortication ( cx neg)  HO illicit drug abuse  Anemia    Recommendations    IV ABX  f/up path  CT per sx  incentive spirometry  HOB at 45  Aspiration precautions  DVT ppx

## 2021-06-11 NOTE — PROGRESS NOTE ADULT - SUBJECTIVE AND OBJECTIVE BOX
PAVAN SOUZA  40y, Male  Allergy: No Known Allergies      LOS  6d    CHIEF COMPLAINT: Pneumonia (11 Jun 2021 10:01)      INTERVAL EVENTS/HPI  - No acute events overnight  - T(F): , Max: 98.8 (06-10-21 @ 21:52)  - Denies any worsening symptoms  - Tolerating medication  - WBC Count: 17.00 (06-11-21 @ 05:39)  WBC Count: 27.50 (06-10-21 @ 01:30)     - Creatinine, Serum: 0.7 (06-11-21 @ 05:39)  Creatinine, Serum: 0.7 (06-10-21 @ 01:30)       ROS  General: Denies rigors, nightsweats  HEENT: Denies headache, rhinorrhea, sore throat, eye pain  CV: Denies CP, palpitations  PULM: Denies wheezing, hemoptysis  GI: Denies hematemesis, hematochezia, melena  : Denies discharge, hematuria  MSK: Denies arthralgias, myalgias  SKIN: Denies rash, lesions  NEURO: Denies paresthesias, weakness  PSYCH: Denies depression, anxiety    VITALS:  T(F): 98.6, Max: 98.8 (06-10-21 @ 21:52)  HR: 87  BP: 130/72  RR: 18Vital Signs Last 24 Hrs  T(C): 37 (11 Jun 2021 17:00), Max: 37.1 (10 Vu 2021 21:52)  T(F): 98.6 (11 Jun 2021 17:00), Max: 98.8 (10 Vu 2021 21:52)  HR: 87 (11 Jun 2021 17:00) (87 - 104)  BP: 130/72 (11 Jun 2021 17:00) (126/84 - 148/87)  BP(mean): --  RR: 18 (11 Jun 2021 17:00) (18 - 18)  SpO2: 99% (11 Jun 2021 13:09) (99% - 99%)    PHYSICAL EXAM:  Gen: NAD, resting in bed  HEENT: Normocephalic, atraumatic  Neck: supple, no lymphadenopathy  CV: Regular rate & regular rhythm  Lungs: decreased BS at bases, no fremitus  Abdomen: Soft, BS present  Ext: Warm, well perfused  Neuro: non focal, awake  Skin: no rash, no erythema  Lines: no phlebitis    FH: Non-contributory  Social Hx: Non-contributory    TESTS & MEASUREMENTS:                        7.9    17.00 )-----------( 740      ( 11 Jun 2021 05:39 )             27.3     06-11    136  |  98  |  11  ----------------------------<  84  4.9   |  30  |  0.7    Ca    7.8<L>      11 Jun 2021 05:39  Mg     1.9     06-11    TPro  5.4<L>  /  Alb  2.5<L>  /  TBili  0.2  /  DBili  x   /  AST  19  /  ALT  19  /  AlkPhos  131<H>  06-10    eGFR if Non African American: 116 mL/min/1.73M2 (06-11-21 @ 05:39)  eGFR if African American: 135 mL/min/1.73M2 (06-11-21 @ 05:39)    LIVER FUNCTIONS - ( 10 Vu 2021 01:30 )  Alb: 2.5 g/dL / Pro: 5.4 g/dL / ALK PHOS: 131 U/L / ALT: 19 U/L / AST: 19 U/L / GGT: x               Culture - Tissue with Gram Stain (collected 06-09-21 @ 08:58)  Source: .Tissue None  Gram Stain (06-09-21 @ 23:59):    No polymorphonuclear cells seen per low power field    No organisms seen per oil power field  Preliminary Report (06-10-21 @ 15:53):    No growth to date.    Culture - Body Fluid with Gram Stain (collected 06-09-21 @ 08:58)  Source: .Body Fluid None  Gram Stain (06-09-21 @ 23:25):    polymorphonuclear leukocytes seen    No organisms seen    by cytocentrifuge  Preliminary Report (06-10-21 @ 16:52):    No growth    Culture - Bronchial (collected 06-09-21 @ 08:58)  Source: .Bronchial None  Gram Stain (06-09-21 @ 23:48):    Moderate polymorphonuclear leukocytes per low power field    No squamous epithelial cells per low power field    No organisms seen per oil power field  Preliminary Report (06-10-21 @ 15:54):    No growth    Culture - Fungal, Body Fluid (collected 06-07-21 @ 11:20)  Source: .Body Fluid Pleural Fluid  Preliminary Report (06-08-21 @ 07:46):    Testing in progress    Culture - Body Fluid with Gram Stain (collected 06-07-21 @ 11:20)  Source: .Body Fluid Pleural Fluid  Gram Stain (06-07-21 @ 23:44):    polymorphonuclear leukocytes seen    No organisms seen    by cytocentrifuge  Preliminary Report (06-08-21 @ 16:59):    No growth    Culture - Blood (collected 06-05-21 @ 16:30)  Source: .Blood Blood  Final Report (06-11-21 @ 01:00):    No Growth Final    Culture - Blood (collected 06-05-21 @ 16:30)  Source: .Blood Blood  Final Report (06-11-21 @ 01:00):    No Growth Final            INFECTIOUS DISEASES TESTING  COVID-19 PCR: NotDetec (06-08-21 @ 18:12)  MRSA PCR Result.: Negative (06-07-21 @ 06:30)  Legionella Antigen, Urine: Negative (06-06-21 @ 16:50)  Procalcitonin, Serum: 0.63 (06-06-21 @ 05:24)  Rapid HIV-1/2 Antibody: Nonreact (06-06-21 @ 05:24)  COVID-19 PCR: NotDetec (06-05-21 @ 18:25)  COVID-19 PCR: NotDetec (05-20-21 @ 13:35)      INFLAMMATORY MARKERS      RADIOLOGY & ADDITIONAL TESTS:  I have personally reviewed the last available Chest xray  CXR  Xray Chest 1 View- PORTABLE-Urgent:   EXAM:  XR CHEST PORTABLE URGENT 1V          *** ADDENDUM 06/09/2021  ***    Dr. Soliz was made aware of the above findings on 6/9/2021 at 1:40 PM with read back    *** END OF ADDENDUM 06/09/2021  ***        PROCEDURE DATE:  06/09/2021            INTERPRETATION:  Clinical History / Reason for exam: Chest tube placement    Comparison : Chest radiograph 6/8/2021.    Technique/Positioning: Frontal, portable.    Findings:    Support devices: In the interval, 2 left chest tubes have been placed. Telemetry leads overlie patient.    Cardiac/mediastinum/hilum: Difficult to evaluate due to parenchymal lung disease    Lung parenchyma/Pleura: Improved aeration to left lung with residual left mid and lower lung field opacity. There are residual bilateral interstitial opacities. There is a small left apical pneumothorax with an air gap of 6 mm.    Skeleton/soft tissues: New left chest wall subcutaneous emphysema. This is likely postsurgical in nature    Impression:    Small left apical pneumothorax with an air gap of 6 mm.    Improved aeration to the left lung with residual left mid and lower lung field opacity    Residual bilateral interstitial opacities      ***Please see the addendum at the top of this report. It may contain additional important information or changes.****        FELIBERTO FIELDS MD; Attending Radiologist  This document has been electronically signed. Jun 9 2021  1:27PM  Addend:FELIBERTO FIELDS MD; Attending Radiologist  This addendum was electronically signed on: Jun 9 20211:40PM. (06-09-21 @ 13:18)      CT      CARDIOLOGY TESTING  12 Lead ECG:   Ventricular Rate 104 BPM    Atrial Rate 104 BPM    P-R Interval 122 ms    QRS Duration 92 ms    Q-T Interval 344 ms    QTC Calculation(Bazett) 452 ms    P Axis 24 degrees    R Axis 29 degrees    T Axis -17 degrees    Diagnosis Line Sinus tachycardia  T wave abnormality, consider inferior ischemia  T wave abnormality, consider anterior ischemia  Abnormal ECG    Confirmed by Jairon More ([id number]) on 6/10/2021 3:34:30 PM (06-10-21 @ 14:14)  12 Lead ECG:   Ventricular Rate 108 BPM    Atrial Rate 108 BPM    P-R Interval 120 ms    QRS Duration 94 ms    Q-T Interval 494 ms    QTC Calculation(Bazett) 661 ms    P Axis 28 degrees    R Axis 53 degrees    T Axis 41 degrees    Diagnosis Line Sinus tachycardia  ST & T wave abnormality, consider inferolateral ischemia  Prolonged QT  Abnormal ECG    Confirmed by AUGUSTINE MOORE MD ([id number]) on 6/6/2021 9:05:37 AM (06-05-21 @ 15:34)      MEDICATIONS  acetaminophen   Tablet .. 650 Oral every 6 hours  cefepime   IVPB 2000 IV Intermittent every 8 hours  enoxaparin Injectable 40 SubCutaneous daily  gabapentin 300 Oral three times a day  HYDROmorphone PCA (1 mG/mL) 30 PCA Continuous PCA Continuous  methocarbamol 500 Oral every 8 hours  metroNIDAZOLE  IVPB 500 IV Intermittent every 8 hours  pantoprazole  Injectable 40 IV Push two times a day  polyethylene glycol 3350 17 Oral daily  senna 2 Oral at bedtime  sodium chloride 0.9%. 1000 IV Continuous <Continuous>  sucralfate 1 Oral every 6 hours      WEIGHT  Weight (kg): 79.4 (06-09-21 @ 18:03)  Creatinine, Serum: 0.7 mg/dL (06-11-21 @ 05:39)      ANTIBIOTICS:  cefepime   IVPB 2000 milliGRAM(s) IV Intermittent every 8 hours  metroNIDAZOLE  IVPB 500 milliGRAM(s) IV Intermittent every 8 hours      All available historical records have been reviewed

## 2021-06-12 LAB
ANION GAP SERPL CALC-SCNC: 9 MMOL/L — SIGNIFICANT CHANGE UP (ref 7–14)
BASOPHILS # BLD AUTO: 0.11 K/UL — SIGNIFICANT CHANGE UP (ref 0–0.2)
BASOPHILS NFR BLD AUTO: 0.7 % — SIGNIFICANT CHANGE UP (ref 0–1)
BUN SERPL-MCNC: 10 MG/DL — SIGNIFICANT CHANGE UP (ref 10–20)
CALCIUM SERPL-MCNC: 8.1 MG/DL — LOW (ref 8.5–10.1)
CHLORIDE SERPL-SCNC: 99 MMOL/L — SIGNIFICANT CHANGE UP (ref 98–110)
CO2 SERPL-SCNC: 30 MMOL/L — SIGNIFICANT CHANGE UP (ref 17–32)
CREAT SERPL-MCNC: 0.7 MG/DL — SIGNIFICANT CHANGE UP (ref 0.7–1.5)
CULTURE RESULTS: SIGNIFICANT CHANGE UP
EOSINOPHIL # BLD AUTO: 0.21 K/UL — SIGNIFICANT CHANGE UP (ref 0–0.7)
EOSINOPHIL NFR BLD AUTO: 1.4 % — SIGNIFICANT CHANGE UP (ref 0–8)
GLUCOSE SERPL-MCNC: 108 MG/DL — HIGH (ref 70–99)
HCT VFR BLD CALC: 29.3 % — LOW (ref 42–52)
HGB BLD-MCNC: 8.5 G/DL — LOW (ref 14–18)
IMM GRANULOCYTES NFR BLD AUTO: 2 % — HIGH (ref 0.1–0.3)
LYMPHOCYTES # BLD AUTO: 1.83 K/UL — SIGNIFICANT CHANGE UP (ref 1.2–3.4)
LYMPHOCYTES # BLD AUTO: 12.2 % — LOW (ref 20.5–51.1)
MAGNESIUM SERPL-MCNC: 1.7 MG/DL — LOW (ref 1.8–2.4)
MCHC RBC-ENTMCNC: 20.9 PG — LOW (ref 27–31)
MCHC RBC-ENTMCNC: 29 G/DL — LOW (ref 32–37)
MCV RBC AUTO: 72.2 FL — LOW (ref 80–94)
MONOCYTES # BLD AUTO: 1.49 K/UL — HIGH (ref 0.1–0.6)
MONOCYTES NFR BLD AUTO: 9.9 % — HIGH (ref 1.7–9.3)
NEUTROPHILS # BLD AUTO: 11.04 K/UL — HIGH (ref 1.4–6.5)
NEUTROPHILS NFR BLD AUTO: 73.8 % — SIGNIFICANT CHANGE UP (ref 42.2–75.2)
NRBC # BLD: 0 /100 WBCS — SIGNIFICANT CHANGE UP (ref 0–0)
PHOSPHATE SERPL-MCNC: 3.2 MG/DL — SIGNIFICANT CHANGE UP (ref 2.1–4.9)
PLATELET # BLD AUTO: 804 K/UL — HIGH (ref 130–400)
POTASSIUM SERPL-MCNC: 4.7 MMOL/L — SIGNIFICANT CHANGE UP (ref 3.5–5)
POTASSIUM SERPL-SCNC: 4.7 MMOL/L — SIGNIFICANT CHANGE UP (ref 3.5–5)
RBC # BLD: 4.06 M/UL — LOW (ref 4.7–6.1)
RBC # FLD: 28.3 % — HIGH (ref 11.5–14.5)
SODIUM SERPL-SCNC: 138 MMOL/L — SIGNIFICANT CHANGE UP (ref 135–146)
SPECIMEN SOURCE: SIGNIFICANT CHANGE UP
WBC # BLD: 14.98 K/UL — HIGH (ref 4.8–10.8)
WBC # FLD AUTO: 14.98 K/UL — HIGH (ref 4.8–10.8)

## 2021-06-12 PROCEDURE — 71045 X-RAY EXAM CHEST 1 VIEW: CPT | Mod: 26

## 2021-06-12 RX ADMIN — Medication 100 MILLIGRAM(S): at 06:17

## 2021-06-12 RX ADMIN — Medication 650 MILLIGRAM(S): at 00:50

## 2021-06-12 RX ADMIN — Medication 650 MILLIGRAM(S): at 17:19

## 2021-06-12 RX ADMIN — PANTOPRAZOLE SODIUM 40 MILLIGRAM(S): 20 TABLET, DELAYED RELEASE ORAL at 06:17

## 2021-06-12 RX ADMIN — Medication 650 MILLIGRAM(S): at 11:14

## 2021-06-12 RX ADMIN — CEFEPIME 100 MILLIGRAM(S): 1 INJECTION, POWDER, FOR SOLUTION INTRAMUSCULAR; INTRAVENOUS at 21:34

## 2021-06-12 RX ADMIN — PANTOPRAZOLE SODIUM 40 MILLIGRAM(S): 20 TABLET, DELAYED RELEASE ORAL at 17:18

## 2021-06-12 RX ADMIN — SENNA PLUS 2 TABLET(S): 8.6 TABLET ORAL at 21:35

## 2021-06-12 RX ADMIN — POLYETHYLENE GLYCOL 3350 17 GRAM(S): 17 POWDER, FOR SOLUTION ORAL at 11:12

## 2021-06-12 RX ADMIN — GABAPENTIN 300 MILLIGRAM(S): 400 CAPSULE ORAL at 14:17

## 2021-06-12 RX ADMIN — Medication 650 MILLIGRAM(S): at 17:21

## 2021-06-12 RX ADMIN — Medication 650 MILLIGRAM(S): at 06:18

## 2021-06-12 RX ADMIN — ENOXAPARIN SODIUM 40 MILLIGRAM(S): 100 INJECTION SUBCUTANEOUS at 11:11

## 2021-06-12 RX ADMIN — Medication 1 GRAM(S): at 01:50

## 2021-06-12 RX ADMIN — GABAPENTIN 300 MILLIGRAM(S): 400 CAPSULE ORAL at 21:35

## 2021-06-12 RX ADMIN — METHOCARBAMOL 500 MILLIGRAM(S): 500 TABLET, FILM COATED ORAL at 06:18

## 2021-06-12 RX ADMIN — Medication 1 GRAM(S): at 17:18

## 2021-06-12 RX ADMIN — CEFEPIME 100 MILLIGRAM(S): 1 INJECTION, POWDER, FOR SOLUTION INTRAMUSCULAR; INTRAVENOUS at 06:19

## 2021-06-12 RX ADMIN — Medication 1 GRAM(S): at 11:11

## 2021-06-12 RX ADMIN — Medication 1 GRAM(S): at 06:18

## 2021-06-12 RX ADMIN — Medication 100 MILLIGRAM(S): at 14:12

## 2021-06-12 RX ADMIN — GABAPENTIN 300 MILLIGRAM(S): 400 CAPSULE ORAL at 06:18

## 2021-06-12 RX ADMIN — Medication 100 MILLIGRAM(S): at 21:34

## 2021-06-12 RX ADMIN — CEFEPIME 100 MILLIGRAM(S): 1 INJECTION, POWDER, FOR SOLUTION INTRAMUSCULAR; INTRAVENOUS at 14:17

## 2021-06-12 RX ADMIN — Medication 650 MILLIGRAM(S): at 11:11

## 2021-06-12 RX ADMIN — METHOCARBAMOL 500 MILLIGRAM(S): 500 TABLET, FILM COATED ORAL at 14:17

## 2021-06-12 RX ADMIN — METHOCARBAMOL 500 MILLIGRAM(S): 500 TABLET, FILM COATED ORAL at 21:35

## 2021-06-12 NOTE — CONSULT NOTE ADULT - SUBJECTIVE AND OBJECTIVE BOX
HPI:  40 M PMH opoid use, peptic ulcer disease (last 15 years, found to have bleeding ulcer with Dr. Shah [facility] on endoscopy 2 month ago, recent admission for anemia with endoscopy showing hill grade 4 ) presents with cough, dyspnea,and chest pain.Symptoms began Jun 2. Patient went to Urgent Care June 3rd,found to have multi-focal pneumonia. Was started on cefuroxime and doxycycline  at time. Patient accompanied by mother who is a nurse; she provided supplemental history. Upon interviewing patient in private, patient admitted to using oxycodone (oral/nasal ingestion) about 1 week ago. He threw up around the same time, and had "black vomitus". He also endorses having a melena episode last night.     Patient denies abdominal pain, diarrhea, constipation, hematochezia, hematuria, weight loss or abdominal pain,  In ED, VSS notable for tachycardia to 117. X-ray shows bilateral pna. ADAM negative.    (05 Jun 2021 20:19)      PAST MEDICAL & SURGICAL HISTORY:  Peptic ulcer disease    No significant past surgical history        Hospital Course:  He is s/p decortication and placement of 2 chest tubes. he had 2 chest tubes placed. He has one chest tube remaining. ON IV antibiotics. There is some deconditioning.  TODAY'S SUBJECTIVE & REVIEW OF SYMPTOMS:     Constitutional WNL   Cardio WNL   Resp WNL   GI WNL  Heme WNL  Endo WNL  Skin WNL  MSK WNL  Neuro WNL  Cognitive WNL  Psych WNL      MEDICATIONS  (STANDING):  acetaminophen   Tablet .. 650 milliGRAM(s) Oral every 6 hours  cefepime   IVPB 2000 milliGRAM(s) IV Intermittent every 8 hours  enoxaparin Injectable 40 milliGRAM(s) SubCutaneous daily  gabapentin 300 milliGRAM(s) Oral three times a day  HYDROmorphone PCA (1 mG/mL) 30 milliLiter(s) PCA Continuous PCA Continuous  methocarbamol 500 milliGRAM(s) Oral every 8 hours  metroNIDAZOLE  IVPB 500 milliGRAM(s) IV Intermittent every 8 hours  pantoprazole  Injectable 40 milliGRAM(s) IV Push two times a day  polyethylene glycol 3350 17 Gram(s) Oral daily  senna 2 Tablet(s) Oral at bedtime  sodium chloride 0.9%. 1000 milliLiter(s) (50 mL/Hr) IV Continuous <Continuous>  sucralfate 1 Gram(s) Oral every 6 hours    MEDICATIONS  (PRN):  ketorolac   Injectable 15 milliGRAM(s) IV Push every 6 hours PRN Moderate Pain (4 - 6)  naloxone Injectable 0.1 milliGRAM(s) IV Push every 3 minutes PRN For ANY of the following changes in patient status:  A. RR LESS THAN 10 breaths per minute, B. Oxygen saturation LESS THAN 90%, C. Sedation score of 6  ondansetron Injectable 4 milliGRAM(s) IV Push every 6 hours PRN Nausea      FAMILY HISTORY:      Allergies    No Known Allergies    Intolerances        SOCIAL HISTORY:    [  ] Etoh  [  ] Smoking  [  ] Substance abuse     Home Environment:  [  ] Home Alone  [x  ] Lives with Family-mother  [  ] Home Health Aid    Dwelling:  [  ] Apartment  [  ] Private House  [  ] Adult Home  [  ] Skilled Nursing Facility      [  ] Short Term  [  ] Long Term  [  ] Stairs       Elevator [  ]    FUNCTIONAL STATUS PTA: (Check all that apply)  Ambulation: [ x  ]Independent    [  ] Dependent     [  ] Non-Ambulatory  Assistive Device: [  ] SA Cane  [  ]  Q Cane  [  ] Walker  [  ]  Wheelchair  ADL : [  ] Independent  [  ]  Dependent       Vital Signs Last 24 Hrs  T(C): 37.1 (12 Jun 2021 13:25), Max: 37.1 (12 Jun 2021 13:25)  T(F): 98.7 (12 Jun 2021 13:25), Max: 98.7 (12 Jun 2021 13:25)  HR: 82 (12 Jun 2021 13:25) (82 - 101)  BP: 136/90 (12 Jun 2021 13:25) (130/72 - 157/92)  BP(mean): --  RR: 18 (12 Jun 2021 13:25) (18 - 18)  SpO2: 93% (12 Jun 2021 02:00) (93% - 93%)      PHYSICAL EXAM: Alert & Oriented X3  GENERAL: NAD, well-groomed, well-developed  HEAD:  Atraumatic, Normocephalic  EYES: EOMI, PERRLA, conjunctiva and sclera clear  NECK: Supple, No JVD, Normal thyroid  CHEST/LUNG: Clear bilaterally; No rales, rhonchi, wheezing, or rubs  HEART: Regular rate and rhythm; No murmurs, rubs, or gallops  ABDOMEN: Soft, Nontender, Nondistended; Bowel sounds present  EXTREMITIES:  2+ Peripheral Pulses, No clubbing, cyanosis, or edema    NERVOUS SYSTEM:  Cranial Nerves 2-12 intact [  ] Abnormal  [  ]  ROM: WFL all extremities [  ]  Abnormal [  ]  Motor Strength: WFL all extremities  [  ]  Abnormal [x  ] 5-/5 LE's  Sensation: intact to light touch [  ] Abnormal [  ]  Reflexes: Symmetric [  ]  Abnormal [  ]    FUNCTIONAL STATUS:  Bed Mobility: Independent [  ]  Supervision [  ]  Needs Assistance [  ]  N/A [  ]  Transfers: Independent [  ]  Supervision [  ]  Needs Assistance [  ]  N/A [  ]   Ambulation: Independent [  ]  Supervision [  ]  Needs Assistance [  ]  N/A [  ]  ADL: Independent [  ] Requires Assistance [  ] N/A [  ]      LABS:                        8.2    14.70 )-----------( 838      ( 11 Jun 2021 20:50 )             28.0     06-11    134<L>  |  98  |  11  ----------------------------<  85  5.2<H>   |  29  |  0.6<L>    Ca    7.8<L>      11 Jun 2021 20:50  Phos  3.0     06-11  Mg     1.8     06-11            RADIOLOGY & ADDITIONAL STUDIES:    Assesment:

## 2021-06-12 NOTE — CONSULT NOTE ADULT - ASSESSMENT
IMPRESSION: Rehab of debility, s/p decortication for pneumonia with empyema    PRECAUTIONS: [ x ] Cardiac  [ x ] Respiratory  [  ] Seizures [  ] Contact Isolation  [  ] Droplet Isolation  [x  ] Other: one chest ube    Weight Bearing Status:     RECOMMENDATION:    Out of Bed to Chair     DVT/Decubiti Prophylaxis    REHAB PLAN:     [ x  ] Bedside P/T 3-5 times a week   [   ]   Bedside O/T  2-3 times a week             [   ] No Rehab Therapy Indicated                   [   ]  Speech Therapy   Conditioning/ROM                                    ADL  Bed Mobility                                               Conditioning/ROM  Transfers                                                     Bed Mobility  Sitting /Standing Balance                         Transfers                                        Gait Training                                               Sitting/Standing Balance  Stair Training [   ]Applicable                    Home equipment Eval                                                                        Splinting  [   ] Only      GOALS:   ADL   [  x ]   Independent                    Transfers  [ x  ] Independent                          Ambulation  [ x  ] Independent     [   x ] With device                            [   ]  CG                                                         [   ]  CG                                                                  [   ] CG                            [    ] Min A                                                   [   ] Min A                                                              [   ] Min  A          DISCHARGE PLAN:   [   ]  Good candidate for Intensive Rehabilitation/Hospital based-4A [facility]                                             Will tolerate 3hrs Intensive Rehab Daily                                       [ x   ]  Short Term Rehab in Skilled Nursing Facility                                                         VS                                     [ x   ]  Home with Outpatient or VN services                                         [    ]  Possible Candidate for Intensive Hospital based Rehab

## 2021-06-13 LAB
ANION GAP SERPL CALC-SCNC: 9 MMOL/L — SIGNIFICANT CHANGE UP (ref 7–14)
BUN SERPL-MCNC: 9 MG/DL — LOW (ref 10–20)
CALCIUM SERPL-MCNC: 8.7 MG/DL — SIGNIFICANT CHANGE UP (ref 8.5–10.1)
CHLORIDE SERPL-SCNC: 99 MMOL/L — SIGNIFICANT CHANGE UP (ref 98–110)
CO2 SERPL-SCNC: 27 MMOL/L — SIGNIFICANT CHANGE UP (ref 17–32)
CREAT SERPL-MCNC: 0.7 MG/DL — SIGNIFICANT CHANGE UP (ref 0.7–1.5)
GLUCOSE SERPL-MCNC: 117 MG/DL — HIGH (ref 70–99)
HCT VFR BLD CALC: 31.2 % — LOW (ref 42–52)
HGB BLD-MCNC: 9 G/DL — LOW (ref 14–18)
MAGNESIUM SERPL-MCNC: 1.9 MG/DL — SIGNIFICANT CHANGE UP (ref 1.8–2.4)
MCHC RBC-ENTMCNC: 20.9 PG — LOW (ref 27–31)
MCHC RBC-ENTMCNC: 28.8 G/DL — LOW (ref 32–37)
MCV RBC AUTO: 72.4 FL — LOW (ref 80–94)
NRBC # BLD: 0 /100 WBCS — SIGNIFICANT CHANGE UP (ref 0–0)
PHOSPHATE SERPL-MCNC: 3.4 MG/DL — SIGNIFICANT CHANGE UP (ref 2.1–4.9)
PLATELET # BLD AUTO: 800 K/UL — HIGH (ref 130–400)
POTASSIUM SERPL-MCNC: 5 MMOL/L — SIGNIFICANT CHANGE UP (ref 3.5–5)
POTASSIUM SERPL-SCNC: 5 MMOL/L — SIGNIFICANT CHANGE UP (ref 3.5–5)
RBC # BLD: 4.31 M/UL — LOW (ref 4.7–6.1)
RBC # FLD: 28.9 % — HIGH (ref 11.5–14.5)
SODIUM SERPL-SCNC: 135 MMOL/L — SIGNIFICANT CHANGE UP (ref 135–146)
WBC # BLD: 12.85 K/UL — HIGH (ref 4.8–10.8)
WBC # FLD AUTO: 12.85 K/UL — HIGH (ref 4.8–10.8)

## 2021-06-13 PROCEDURE — 71045 X-RAY EXAM CHEST 1 VIEW: CPT | Mod: 26

## 2021-06-13 PROCEDURE — 71045 X-RAY EXAM CHEST 1 VIEW: CPT | Mod: 26,77

## 2021-06-13 RX ORDER — HYDROMORPHONE HYDROCHLORIDE 2 MG/ML
0.3 INJECTION INTRAMUSCULAR; INTRAVENOUS; SUBCUTANEOUS EVERY 8 HOURS
Refills: 0 | Status: DISCONTINUED | OUTPATIENT
Start: 2021-06-13 | End: 2021-06-13

## 2021-06-13 RX ORDER — GABAPENTIN 400 MG/1
400 CAPSULE ORAL THREE TIMES A DAY
Refills: 0 | Status: DISCONTINUED | OUTPATIENT
Start: 2021-06-13 | End: 2021-06-14

## 2021-06-13 RX ORDER — KETOROLAC TROMETHAMINE 30 MG/ML
15 SYRINGE (ML) INJECTION EVERY 6 HOURS
Refills: 0 | Status: DISCONTINUED | OUTPATIENT
Start: 2021-06-13 | End: 2021-06-14

## 2021-06-13 RX ORDER — METHADONE HYDROCHLORIDE 40 MG/1
10 TABLET ORAL EVERY 8 HOURS
Refills: 0 | Status: DISCONTINUED | OUTPATIENT
Start: 2021-06-13 | End: 2021-06-14

## 2021-06-13 RX ORDER — ACETAMINOPHEN 500 MG
650 TABLET ORAL EVERY 4 HOURS
Refills: 0 | Status: DISCONTINUED | OUTPATIENT
Start: 2021-06-13 | End: 2021-06-14

## 2021-06-13 RX ADMIN — CEFEPIME 100 MILLIGRAM(S): 1 INJECTION, POWDER, FOR SOLUTION INTRAMUSCULAR; INTRAVENOUS at 05:34

## 2021-06-13 RX ADMIN — GABAPENTIN 300 MILLIGRAM(S): 400 CAPSULE ORAL at 05:28

## 2021-06-13 RX ADMIN — GABAPENTIN 400 MILLIGRAM(S): 400 CAPSULE ORAL at 21:53

## 2021-06-13 RX ADMIN — Medication 1 GRAM(S): at 00:30

## 2021-06-13 RX ADMIN — Medication 1 GRAM(S): at 05:29

## 2021-06-13 RX ADMIN — Medication 100 MILLIGRAM(S): at 05:34

## 2021-06-13 RX ADMIN — Medication 650 MILLIGRAM(S): at 11:06

## 2021-06-13 RX ADMIN — Medication 650 MILLIGRAM(S): at 18:34

## 2021-06-13 RX ADMIN — CEFEPIME 100 MILLIGRAM(S): 1 INJECTION, POWDER, FOR SOLUTION INTRAMUSCULAR; INTRAVENOUS at 13:01

## 2021-06-13 RX ADMIN — ENOXAPARIN SODIUM 40 MILLIGRAM(S): 100 INJECTION SUBCUTANEOUS at 11:06

## 2021-06-13 RX ADMIN — Medication 650 MILLIGRAM(S): at 17:31

## 2021-06-13 RX ADMIN — Medication 650 MILLIGRAM(S): at 00:30

## 2021-06-13 RX ADMIN — SENNA PLUS 2 TABLET(S): 8.6 TABLET ORAL at 21:55

## 2021-06-13 RX ADMIN — METHOCARBAMOL 500 MILLIGRAM(S): 500 TABLET, FILM COATED ORAL at 13:02

## 2021-06-13 RX ADMIN — CEFEPIME 100 MILLIGRAM(S): 1 INJECTION, POWDER, FOR SOLUTION INTRAMUSCULAR; INTRAVENOUS at 21:54

## 2021-06-13 RX ADMIN — Medication 15 MILLIGRAM(S): at 18:35

## 2021-06-13 RX ADMIN — METHOCARBAMOL 500 MILLIGRAM(S): 500 TABLET, FILM COATED ORAL at 21:55

## 2021-06-13 RX ADMIN — HYDROMORPHONE HYDROCHLORIDE 0.3 MILLIGRAM(S): 2 INJECTION INTRAMUSCULAR; INTRAVENOUS; SUBCUTANEOUS at 18:35

## 2021-06-13 RX ADMIN — Medication 100 MILLIGRAM(S): at 13:01

## 2021-06-13 RX ADMIN — SODIUM CHLORIDE 50 MILLILITER(S): 9 INJECTION INTRAMUSCULAR; INTRAVENOUS; SUBCUTANEOUS at 10:00

## 2021-06-13 RX ADMIN — Medication 650 MILLIGRAM(S): at 21:54

## 2021-06-13 RX ADMIN — Medication 650 MILLIGRAM(S): at 06:49

## 2021-06-13 RX ADMIN — Medication 100 MILLIGRAM(S): at 21:54

## 2021-06-13 RX ADMIN — Medication 1 GRAM(S): at 17:31

## 2021-06-13 RX ADMIN — HYDROMORPHONE HYDROCHLORIDE 0.3 MILLIGRAM(S): 2 INJECTION INTRAMUSCULAR; INTRAVENOUS; SUBCUTANEOUS at 15:41

## 2021-06-13 RX ADMIN — GABAPENTIN 400 MILLIGRAM(S): 400 CAPSULE ORAL at 13:02

## 2021-06-13 RX ADMIN — Medication 650 MILLIGRAM(S): at 15:34

## 2021-06-13 RX ADMIN — Medication 650 MILLIGRAM(S): at 23:26

## 2021-06-13 RX ADMIN — POLYETHYLENE GLYCOL 3350 17 GRAM(S): 17 POWDER, FOR SOLUTION ORAL at 11:06

## 2021-06-13 RX ADMIN — METHOCARBAMOL 500 MILLIGRAM(S): 500 TABLET, FILM COATED ORAL at 05:28

## 2021-06-13 RX ADMIN — Medication 1 GRAM(S): at 11:06

## 2021-06-13 RX ADMIN — Medication 650 MILLIGRAM(S): at 05:27

## 2021-06-13 RX ADMIN — Medication 650 MILLIGRAM(S): at 01:22

## 2021-06-13 RX ADMIN — PANTOPRAZOLE SODIUM 40 MILLIGRAM(S): 20 TABLET, DELAYED RELEASE ORAL at 17:31

## 2021-06-13 RX ADMIN — PANTOPRAZOLE SODIUM 40 MILLIGRAM(S): 20 TABLET, DELAYED RELEASE ORAL at 05:31

## 2021-06-13 RX ADMIN — Medication 15 MILLIGRAM(S): at 17:31

## 2021-06-13 NOTE — CONSULT NOTE ADULT - PROVIDER SPECIALTY LIST ADULT
Addiction Medicine
Thoracic Surgery
Cardiology
Physiatry
Pain Medicine
Gastroenterology
Infectious Disease
Pulmonology
Addiction Medicine

## 2021-06-13 NOTE — CONSULT NOTE ADULT - TIME BILLING
I, Adrian Villalba reviewed the diagnostic images on patient Za Malin on 06/07/21 and agreed with Dr. Lindsey’ clinical note, physical examination and treatment plan.  Mr. Malin is a 40 years old male with diagnosis of left empyema.  Patient refers 7 days of worsening shortness of breath, cough and low-grade fever, seen by PCP started on oral antibiotics without improvement.  Chest CT revealed large loculated left pleural effusion, thoracentesis by Pulmonary with improvement in respiratory distress but unchanged CXR.  Fluid analysis compatible with complicated exudate, waiting for cultures.  I reviewed the images and examined the patient, I recommended surgical decortication to achieve infection control and adequate lung re-expansion.  Patient understood and agreed.
I have personally seen and examined this patient.    I have reviewed all pertinent clinical information and reviewed all relevant imaging and diagnostic studies personally.   I counseled the patient about diagnostic testing and treatment plan. All questions were answered.   I discussed recommendations with the primary team.
chart review, performing history and physical, and counseling patient.

## 2021-06-13 NOTE — PROGRESS NOTE ADULT - ASSESSMENT
ASSESSMENT:  Mr. Malin is a 40 years old male with diagnosis of left empyema. s/p left VATS with decortication  1 left chest tube is removed     Plan:  1 CT to suction  Daily AM cxr  follow up Chest tube output  monitor for airleak  monitor O2 sat  incentive spirometry  pain control  SCDs  DVT/GI prophylaxis  Passed TOV  f/u urin strep Ag.

## 2021-06-13 NOTE — CONSULT NOTE ADULT - ASSESSMENT
41 yo man admitted for left lung empyema s/p left VATS and decortication with recent removal of one CT still with one CT. Patient was seen by addiction psychiatry who recommended methadone 30mg for maintenance.     - DC dilaudid PCA  - Start methadone 10mg PO TID for acute pain management then switch to daily dosing as outpatient for maintenance.  - Start hydromorphone 0.3mg IV q 8hr/prn BT pain for 24 hours.  - Increase frequency of acetaminophen 650mg to q 4hrs.  - Continue toradol.  - Increase gabapentin to 400mg PO TID.  - Continue methocarbamol.  - Addiction treatment.  - PT.  - Bowel regimen. 41 yo man admitted for left lung empyema s/p left VATS and decortication with recent removal of both chest tubes. Patient was seen by addiction psychiatry who recommended methadone 30mg for maintenance.     - DC dilaudid PCA  - Start methadone 10mg PO TID for acute pain management then switch to daily dosing as outpatient for maintenance.  - Increase frequency of acetaminophen 650mg to q 4hrs if needed.  - Continue toradol.  - Increase gabapentin to 400mg PO TID.  - Continue methocarbamol.  - Addiction treatment.  - PT.  - Bowel regimen. 39 yo man admitted for left lung empyema s/p left VATS and decortication with recent removal of both chest tubes. Patient was seen by addiction psychiatry who recommended methadone 30mg q am for maintenance.     - DC dilaudid PCA  - Start methadone 10mg PO TID for acute pain management then switch to 30mg once daily dosing as outpatient for maintenance.  - Increase frequency of acetaminophen 650mg to q 4hrs if needed.  - Continue toradol.  - Increase gabapentin to 400mg PO TID.  - Continue methocarbamol.  - Addiction treatment.  - PT.  - Bowel regimen.

## 2021-06-13 NOTE — PHYSICAL THERAPY INITIAL EVALUATION ADULT - ADDITIONAL COMMENTS
Pt. reports he lives with "someone" NOS in apt without steps to enter or within, previously independent without device.

## 2021-06-13 NOTE — CONSULT NOTE ADULT - CONSULT REQUESTED DATE/TIME
06-Jun-2021 06:17
06-Jun-2021 18:09
07-Jun-2021 13:00
12-Jun-2021 15:32
10-Vu-2021 15:18
07-Jun-2021 10:34
06-Jun-2021 15:21
07-Jun-2021 14:12
13-Jun-2021 09:12

## 2021-06-13 NOTE — CONSULT NOTE ADULT - SUBJECTIVE AND OBJECTIVE BOX
Chief Complaint: chest pain    HPI:  Mr. Malin is a 39 yo man with a hx of PUD who recently overdosed on opioids and aspirated. He developed a left lung PNA which persisted despite outpatient abx treatment. He was admitted for treatement of left lung empyema and is s/p left lung VATS and CT placement. Patient also reports melena and coffee ground emesis. He denies abdominal pain, V/D, +cough, +SOB.       PAST MEDICAL & SURGICAL HISTORY:  Peptic ulcer disease    No significant past surgical history        FAMILY HISTORY:      SOCIAL HISTORY:  Opioid abuse  Lives at home    Allergies    No Known Allergies    Intolerances        PAIN MEDICATIONS:  acetaminophen   Tablet .. 650 milliGRAM(s) Oral every 6 hours  gabapentin 300 milliGRAM(s) Oral three times a day  HYDROmorphone PCA (1 mG/mL) 30 milliLiter(s) PCA Continuous PCA Continuous  ketorolac   Injectable 15 milliGRAM(s) IV Push every 6 hours PRN  methocarbamol 500 milliGRAM(s) Oral every 8 hours  ondansetron Injectable 4 milliGRAM(s) IV Push every 6 hours PRN    Heme:  enoxaparin Injectable 40 milliGRAM(s) SubCutaneous daily    Antibiotics:  cefepime   IVPB 2000 milliGRAM(s) IV Intermittent every 8 hours  metroNIDAZOLE  IVPB 500 milliGRAM(s) IV Intermittent every 8 hours    Cardiovascular:    GI:  pantoprazole  Injectable 40 milliGRAM(s) IV Push two times a day  polyethylene glycol 3350 17 Gram(s) Oral daily  senna 2 Tablet(s) Oral at bedtime  sucralfate 1 Gram(s) Oral every 6 hours    Endocrine:    All Other Medications:  naloxone Injectable 0.1 milliGRAM(s) IV Push every 3 minutes PRN  sodium chloride 0.9%. 1000 milliLiter(s) IV Continuous <Continuous>      REVIEW OF SYSTEMS:    CONSTITUTIONAL: + fever, No weight loss, + fatigue  EYES: No eye pain, visual disturbances, or discharge  ENMT:  No difficulty hearing, tinnitus, vertigo; No sinus or throat pain  NECK: No pain or stiffness  BREASTS: No pain, masses, or nipple discharge  RESPIRATORY:  +cough, wheezing, chills, No hemoptysis; +shortness of breath  CARDIOVASCULAR: No chest pain, palpitations, dizziness, or leg swelling  GASTROINTESTINAL: No abdominal or epigastric pain. No nausea, or hematemesis; No diarrhea or constipation. + melena , No hematochezia., + coffee ground emesis  GENITOURINARY: No dysuria, frequency, hematuria, or incontinence  NEUROLOGICAL: No headaches, memory loss, loss of strength, numbness, or tremors  SKIN: No itching, burning, rashes, or lesions   LYMPH NODES: No enlarged glands  ENDOCRINE: No heat or cold intolerance; No hair loss  MUSCULOSKELETAL: No joint pain or swelling; No muscle, back, or extremity pain  PSYCHIATRIC: No depression, anxiety, mood swings, or difficulty sleeping  HEME/LYMPH: No easy bruising, or bleeding gums  ALLERY AND IMMUNOLOGIC: No hives or eczema      Vital Signs Last 24 Hrs  T(C): 37.4 (13 Jun 2021 05:00), Max: 37.7 (12 Jun 2021 21:19)  T(F): 99.3 (13 Jun 2021 05:00), Max: 99.9 (12 Jun 2021 21:19)  HR: 99 (13 Jun 2021 05:00) (82 - 107)  BP: 128/80 (13 Jun 2021 05:00) (128/80 - 146/74)  BP(mean): --  RR: 18 (13 Jun 2021 05:00) (18 - 18)  SpO2: --    PAIN SCORE:     7    SCALE USED: (1-10 VNRS)             PHYSICAL EXAM:    GENERAL: NAD, well-developed  HEAD:  Atraumatic, Normocephalic  EYES:  conjunctiva and sclera clear  ENMT:  Moist mucous membranes,  No lesions  NECK: Supple, No JVD  NERVOUS SYSTEM:  Alert & Oriented X3, Good concentration; Motor Strength 5/5 B/L upper and lower extremities  CHEST/LUNG:  left CT   HEART: + S1 and S2  ABDOMEN: Soft, Nontender, Nondistended  EXTREMITIES:  2+ Peripheral Pulses, No clubbing, cyanosis, or edema  SKIN: No rashes or lesions        LABS:                          8.5    14.98 )-----------( 804      ( 12 Jun 2021 20:00 )             29.3     06-12    138  |  99  |  10  ----------------------------<  108<H>  4.7   |  30  |  0.7    Ca    8.1<L>      12 Jun 2021 20:00  Phos  3.2     06-12  Mg     1.7     06-12            RADIOLOGY:      EXAM:  XR CHEST PORTABLE ROUTINE 1V          PROCEDURE DATE:  06/13/2021        INTERPRETATION:  Clinical History / Reason for exam: Decortication    Comparison : Chest radiograph June 12, 2021.    Technique/Positioning: Adequate.    Findings:    Support devices: Left-sided chest tube with its tip overlying the left base.    Cardiac/mediastinum/hilum: Cardiomegaly, unchanged.    Lung parenchyma/Pleura: Bilateral opacities, unchanged.    Skeleton/soft tissues: Stable    Impression:    Cardiomegaly and bilateral opacities, unchanged.    Left-sided chest tube.      JH MAXWELL MD; Attending Radiologist  This document has been electronically signed. Jun 13 2021  6:37AM                 [ x]  [place]  Reviewed and Copied to Chart Chief Complaint: chest pain    HPI:  Mr. Malin is a 41 yo man with a hx of PUD who recently overdosed on opioids and aspirated. He developed a left lung PNA which persisted despite outpatient abx treatment. He was admitted for treatement of left lung empyema and is s/p left lung VATS and CT placement. Patient also reports melena and coffee ground emesis. He denies abdominal pain, V/D, +cough, +SOB, +CLEANING, +pleuritic CP. Currently he is complaining of left flank soreness after both chest tubes were removed. He was able to get up and walk with PT today. His cough and breathing have improved since admission. He hopes to go home tomorrow.      PAST MEDICAL & SURGICAL HISTORY:  Peptic ulcer disease    No significant past surgical history        FAMILY HISTORY:      SOCIAL HISTORY:  Opioid abuse  Lives at home    Allergies    No Known Allergies    Intolerances        PAIN MEDICATIONS:  acetaminophen   Tablet .. 650 milliGRAM(s) Oral every 6 hours  gabapentin 300 milliGRAM(s) Oral three times a day  HYDROmorphone PCA (1 mG/mL) 30 milliLiter(s) PCA Continuous PCA Continuous  ketorolac   Injectable 15 milliGRAM(s) IV Push every 6 hours PRN  methocarbamol 500 milliGRAM(s) Oral every 8 hours  ondansetron Injectable 4 milliGRAM(s) IV Push every 6 hours PRN    Heme:  enoxaparin Injectable 40 milliGRAM(s) SubCutaneous daily    Antibiotics:  cefepime   IVPB 2000 milliGRAM(s) IV Intermittent every 8 hours  metroNIDAZOLE  IVPB 500 milliGRAM(s) IV Intermittent every 8 hours    Cardiovascular:    GI:  pantoprazole  Injectable 40 milliGRAM(s) IV Push two times a day  polyethylene glycol 3350 17 Gram(s) Oral daily  senna 2 Tablet(s) Oral at bedtime  sucralfate 1 Gram(s) Oral every 6 hours    Endocrine:    All Other Medications:  naloxone Injectable 0.1 milliGRAM(s) IV Push every 3 minutes PRN  sodium chloride 0.9%. 1000 milliLiter(s) IV Continuous <Continuous>      REVIEW OF SYSTEMS:    CONSTITUTIONAL: No fever, No weight loss, + fatigue  EYES: No eye pain, visual disturbances, or discharge  ENMT:  No difficulty hearing, tinnitus, vertigo; No sinus or throat pain  NECK: No pain or stiffness  BREASTS: No pain, masses, or nipple discharge  RESPIRATORY:  +cough, wheezing, chills, No hemoptysis; +shortness of breath, +CLEANING  CARDIOVASCULAR: + chest pain, No palpitations, dizziness, or leg swelling  GASTROINTESTINAL: No abdominal or epigastric pain. No nausea, or hematemesis; No diarrhea or constipation. + melena , No hematochezia., + coffee ground emesis  GENITOURINARY: No dysuria, frequency, hematuria, or incontinence  NEUROLOGICAL: No headaches, memory loss, loss of strength, numbness, or tremors  SKIN: No itching, burning, rashes, or lesions   LYMPH NODES: No enlarged glands  ENDOCRINE: No heat or cold intolerance; No hair loss  MUSCULOSKELETAL: No joint pain or swelling; No muscle, back, or extremity pain  PSYCHIATRIC: No depression, anxiety, mood swings, or difficulty sleeping  HEME/LYMPH: No easy bruising, or bleeding gums  ALLERY AND IMMUNOLOGIC: No hives or eczema      Vital Signs Last 24 Hrs  T(C): 37.4 (13 Jun 2021 05:00), Max: 37.7 (12 Jun 2021 21:19)  T(F): 99.3 (13 Jun 2021 05:00), Max: 99.9 (12 Jun 2021 21:19)  HR: 99 (13 Jun 2021 05:00) (82 - 107)  BP: 128/80 (13 Jun 2021 05:00) (128/80 - 146/74)  BP(mean): --  RR: 18 (13 Jun 2021 05:00) (18 - 18)  SpO2: --    PAIN SCORE:     6   SCALE USED: (1-10 VNRS)             PHYSICAL EXAM:    GENERAL: NAD, well-developed  HEAD:  Atraumatic, Normocephalic  EYES:  conjunctiva and sclera clear  ENMT:  Moist mucous membranes,  No lesions  NECK: Supple, No JVD  NERVOUS SYSTEM:  Alert & Oriented X3, Good concentration; Motor Strength 5/5 B/L upper and lower extremities  CHEST/LUNG:  left CT   HEART: + S1 and S2  ABDOMEN: Soft, Nontender, Nondistended  EXTREMITIES:  2+ Peripheral Pulses, No clubbing, cyanosis, or edema  SKIN: No rashes or lesions        LABS:                          8.5    14.98 )-----------( 804      ( 12 Jun 2021 20:00 )             29.3     06-12    138  |  99  |  10  ----------------------------<  108<H>  4.7   |  30  |  0.7    Ca    8.1<L>      12 Jun 2021 20:00  Phos  3.2     06-12  Mg     1.7     06-12            RADIOLOGY:      EXAM:  XR CHEST PORTABLE ROUTINE 1V          PROCEDURE DATE:  06/13/2021        INTERPRETATION:  Clinical History / Reason for exam: Decortication    Comparison : Chest radiograph June 12, 2021.    Technique/Positioning: Adequate.    Findings:    Support devices: Left-sided chest tube with its tip overlying the left base.    Cardiac/mediastinum/hilum: Cardiomegaly, unchanged.    Lung parenchyma/Pleura: Bilateral opacities, unchanged.    Skeleton/soft tissues: Stable    Impression:    Cardiomegaly and bilateral opacities, unchanged.    Left-sided chest tube.      JH MAXWELL MD; Attending Radiologist  This document has been electronically signed. Jun 13 2021  6:37AM                 [ x]  [place]  Reviewed and Copied to Chart Chief Complaint: chest pain    HPI:  Mr. Malin is a 39 yo man with a hx of PUD who recently overdosed on opioids and aspirated. He developed a left lung PNA which persisted despite outpatient abx treatment. He was admitted for treatement of left lung empyema and is s/p left lung VATS and CT placement. Patient also reports melena and coffee ground emesis. He denies abdominal pain, V/D, +cough, +SOB, +CLEANING, +pleuritic CP. Currently he is complaining of left flank soreness after both chest tubes were removed. He was able to get up and walk with PT today. His cough and breathing have improved since admission. He hopes to go home tomorrow.      PAST MEDICAL & SURGICAL HISTORY:  Peptic ulcer disease    No significant past surgical history        FAMILY HISTORY:      SOCIAL HISTORY:  Opioid abuse  Lives at home    Allergies    No Known Allergies    Intolerances        PAIN MEDICATIONS:  acetaminophen   Tablet .. 650 milliGRAM(s) Oral every 6 hours  gabapentin 300 milliGRAM(s) Oral three times a day  HYDROmorphone PCA (1 mG/mL) 30 milliLiter(s) PCA Continuous PCA Continuous  ketorolac   Injectable 15 milliGRAM(s) IV Push every 6 hours PRN  methocarbamol 500 milliGRAM(s) Oral every 8 hours  ondansetron Injectable 4 milliGRAM(s) IV Push every 6 hours PRN    Heme:  enoxaparin Injectable 40 milliGRAM(s) SubCutaneous daily    Antibiotics:  cefepime   IVPB 2000 milliGRAM(s) IV Intermittent every 8 hours  metroNIDAZOLE  IVPB 500 milliGRAM(s) IV Intermittent every 8 hours    Cardiovascular:    GI:  pantoprazole  Injectable 40 milliGRAM(s) IV Push two times a day  polyethylene glycol 3350 17 Gram(s) Oral daily  senna 2 Tablet(s) Oral at bedtime  sucralfate 1 Gram(s) Oral every 6 hours    Endocrine:    All Other Medications:  naloxone Injectable 0.1 milliGRAM(s) IV Push every 3 minutes PRN  sodium chloride 0.9%. 1000 milliLiter(s) IV Continuous <Continuous>      REVIEW OF SYSTEMS:    CONSTITUTIONAL: No fever, No weight loss, + fatigue  EYES: No eye pain, visual disturbances, or discharge  ENMT:  No difficulty hearing, tinnitus, vertigo; No sinus or throat pain  NECK: No pain or stiffness  BREASTS: No pain, masses, or nipple discharge  RESPIRATORY:  +cough, wheezing, chills, No hemoptysis; +shortness of breath, +CLEANING  CARDIOVASCULAR: + chest pain, No palpitations, dizziness, or leg swelling  GASTROINTESTINAL: No abdominal or epigastric pain. No nausea, or hematemesis; No diarrhea or constipation. + melena , No hematochezia., + coffee ground emesis  GENITOURINARY: No dysuria, frequency, hematuria, or incontinence  NEUROLOGICAL: No headaches, memory loss, loss of strength, numbness, or tremors  SKIN: No itching, burning, rashes, or lesions   LYMPH NODES: No enlarged glands  ENDOCRINE: No heat or cold intolerance; No hair loss  MUSCULOSKELETAL: No joint pain or swelling; No muscle, back, or extremity pain  PSYCHIATRIC: No depression, anxiety, mood swings, or difficulty sleeping  HEME/LYMPH: No easy bruising, or bleeding gums  ALLERY AND IMMUNOLOGIC: No hives or eczema      Vital Signs Last 24 Hrs  T(C): 37.4 (13 Jun 2021 05:00), Max: 37.7 (12 Jun 2021 21:19)  T(F): 99.3 (13 Jun 2021 05:00), Max: 99.9 (12 Jun 2021 21:19)  HR: 99 (13 Jun 2021 05:00) (82 - 107)  BP: 128/80 (13 Jun 2021 05:00) (128/80 - 146/74)  BP(mean): --  RR: 18 (13 Jun 2021 05:00) (18 - 18)  SpO2: --    PAIN SCORE:     6   SCALE USED: (1-10 VNRS)             PHYSICAL EXAM:    GENERAL: NAD, well-developed  HEAD:  Atraumatic, Normocephalic  EYES:  conjunctiva and sclera clear  ENMT:  Moist mucous membranes,  No lesions  NECK: Supple, No JVD  NERVOUS SYSTEM:  Alert & Oriented X3, Good concentration; Motor Strength 5/5 B/L upper and lower extremities  CHEST/LUNG:  left flank TTP, dressing c/d/i  HEART: + S1 and S2  ABDOMEN: Soft, Nontender, Nondistended  EXTREMITIES:  2+ Peripheral Pulses, No clubbing, cyanosis, or edema  SKIN: No rashes or lesions        LABS:                          8.5    14.98 )-----------( 804      ( 12 Jun 2021 20:00 )             29.3     06-12    138  |  99  |  10  ----------------------------<  108<H>  4.7   |  30  |  0.7    Ca    8.1<L>      12 Jun 2021 20:00  Phos  3.2     06-12  Mg     1.7     06-12            RADIOLOGY:      EXAM:  XR CHEST PORTABLE ROUTINE 1V          PROCEDURE DATE:  06/13/2021        INTERPRETATION:  Clinical History / Reason for exam: Decortication    Comparison : Chest radiograph June 12, 2021.    Technique/Positioning: Adequate.    Findings:    Support devices: Left-sided chest tube with its tip overlying the left base.    Cardiac/mediastinum/hilum: Cardiomegaly, unchanged.    Lung parenchyma/Pleura: Bilateral opacities, unchanged.    Skeleton/soft tissues: Stable    Impression:    Cardiomegaly and bilateral opacities, unchanged.    Left-sided chest tube.      JH MAXWELL MD; Attending Radiologist  This document has been electronically signed. Jun 13 2021  6:37AM                 [ x]  [place]  Reviewed and Copied to Chart

## 2021-06-13 NOTE — CHART NOTE - NSCHARTNOTESELECT_GEN_ALL_CORE
Anesthesia PACU note/Transfer Note
Pre-op/Event Note
Event Note
Event Note
Transfer Note
post op check/Event Note

## 2021-06-13 NOTE — CONSULT NOTE ADULT - CONSULT REASON
Pneumonia
Coffee ground vomiting
bilateral pneumonia, left pleural effusion
debility
pain
Prolonged QT
opoid use disorder, previously on methadone taper
empyema
medication assisted treatment, opioid use disorder

## 2021-06-13 NOTE — PROGRESS NOTE ADULT - SUBJECTIVE AND OBJECTIVE BOX
GENERAL SURGERY PROGRESS NOTE     PAVAN SOUZA  40y  Male  Hospital day :8d  POD:  Procedure: Drainage, empyema, using VATS      OVERNIGHT EVENTS:    T(F): 98.9 (06-13-21 @ 01:00), Max: 99.9 (06-12-21 @ 21:19)  HR: 107 (06-13-21 @ 01:00) (82 - 107)  BP: 146/74 (06-13-21 @ 01:00) (136/90 - 157/92)  ABP: --  ABP(mean): --  RR: 18 (06-13-21 @ 01:00) (18 - 18)  SpO2: --    DIET/FLUIDS: sodium chloride 0.9%. 1000 milliLiter(s) IV Continuous <Continuous>    NG:                                                                                DRAINS:     BM:     EMESIS:     URINE:      GI proph:  pantoprazole  Injectable 40 milliGRAM(s) IV Push two times a day    AC/ proph: enoxaparin Injectable 40 milliGRAM(s) SubCutaneous daily    ABx: cefepime   IVPB 2000 milliGRAM(s) IV Intermittent every 8 hours  metroNIDAZOLE  IVPB 500 milliGRAM(s) IV Intermittent every 8 hours    Physical Exam:  General: AAOx 3. NAD  Heart: S1 and S2 noted  Lungs: Clear to auscultation bilaterally. 2 CT to suction, -airleak  Abdomen: Soft Non tender, Non distended.   Extremities: Normal in color and temperature. Motor and sensory grosly intact  Wound: No bleeding or discharge noted from the incision site, no hematoma noted      LABS  Labs:  CAPILLARY BLOOD GLUCOSE                              8.5    14.98 )-----------( 804      ( 12 Jun 2021 20:00 )             29.3       Auto Neutrophil %: 73.8 % (06-12-21 @ 20:00)  Auto Immature Granulocyte %: 2.0 % (06-12-21 @ 20:00)    06-12    138  |  99  |  10  ----------------------------<  108<H>  4.7   |  30  |  0.7      Calcium, Total Serum: 8.1 mg/dL (06-12-21 @ 20:00)      LFTs:         Coags:        Serum Pro-Brain Natriuretic Peptide: 59 pg/mL (06-05-21 @ 16:30)              RADIOLOGY & ADDITIONAL TESTS:      ACCESS/ DEVICES:  [ x] Peripheral IV  [ ] Central Venous Line	[ ] R	[ ] L	[ ] IJ	[ ] Fem	[ ] SC	Placed:   [ ] Arterial Line		[ ] R	[ ] L	[ ] Fem	[ ] Rad	[ ] Ax	Placed:   [ ] PICC:					[ ] Mediport  [ ] Urinary Catheter,  Date Placed:   [x ] Chest tube: [ ] Right, [ x] Left  [ ] YUVAL/Keith Drains

## 2021-06-13 NOTE — CHART NOTE - NSCHARTNOTEFT_GEN_A_CORE
40 M PMH opoid use, peptic ulcer disease (last 15 years, found to have bleeding ulcer with Dr. Shah [facility] on endoscopy 2 month ago, recent admission for anemia with endoscopy showing hill grade 4 ) presents with cough, dyspnea,and chest pain. Symptoms began Jun 2. Patient went to Urgent Care June 3rd,found to have multi-focal pneumonia. Was started on cefuroxime and doxycycline  at time. Patient accompanied by mother who is a nurse; she provided supplemental history. Upon interviewing patient in private, patient admitted to using oxycodone (oral/nasal ingestion) about 1 week ago. He threw up around the same time, and had "black vomitus". He also endorses having a melena episode last night.     Patient denies abdominal pain, diarrhea, constipation, hematochezia, hematuria, weight loss or abdominal pain,  In ED, VSS notable for tachycardia to 117. X-ray shows bilateral pna. ADAM negative.    (05 Jun 2021 20:19)    Thoracic surgery consulted for possible VATS with decortication.   OPERATIVE PROCEDURE(s):    left decortication            POD #     1                   SURGEON(s): SALEEM Villalba  SUBJECTIVE ASSESSMENT: pt seen and examined. no acute complaints  Vital Signs Last 24 Hrs  T(F): 98.2 (10 Vu 2021 08:00), Max: 98.7 (09 Jun 2021 13:00)  HR: 85 (10 Vu 2021 11:00) (62 - 108)  BP: 115/74 (10 Vu 2021 11:00) (84/57 - 134/82)  BP(mean): 89 (10 Vu 2021 11:00) (66 - 102)  ABP: 92/87 (09 Jun 2021 16:00) (83/55 - 115/66)  ABP(mean): 90 (09 Jun 2021 16:00)  RR: 14 (10 Vu 2021 11:00) (10 - 38)  SpO2: 94% (10 Vu 2021 11:00) (92% - 99%)      I&O's Detail    09 Jun 2021 07:01  -  10 Vu 2021 07:00  --------------------------------------------------------  IN:    IV PiggyBack: 300 mL    Lactated Ringers: 500 mL    Oral Fluid: 290 mL    Sodium Chloride 0.9% Bolus: 500 mL  Total IN: 1590 mL    OUT:    Chest Tube (mL): 300 mL    Chest Tube (mL): 110 mL    Indwelling Catheter - Urethral (mL): 1810 mL    Voided (mL): 700 mL  Total OUT: 2920 mL        Net:   I&O's Detail    09 Jun 2021 07:01  -  10 Vu 2021 07:00  --------------------------------------------------------  Total NET: -1330 mL        CAPILLARY BLOOD GLUCOSE        Physical Exam:  General: NAD; A&Ox3  Cardiac: S1/S2, RRR, no murmur, no rubs  Lungs: unlabored respirations, CTA b/l, no wheeze, no rales, no crackles  Abdomen: Soft/NT/ND; positive bowel sounds x 4  Incisions: Incisions clean/dry/intact  Extremities: No edema b/l lower extremities; good capillary refill; no cyanosis; palpable 1+ pedal pulses b/l          LABS:                        8.4<L>  27.50<H> )-----------( 691<H>    ( 10 Vu 2021 01:30 )             27.7<L>                        8.3<L>  29.36<H> )-----------( 616<H>    ( 09 Jun 2021 14:35 )             27.4<L>    06-10    138  |  101  |  10  ----------------------------<  108<H>  4.6   |  26  |  0.7  06-09    137  |  103  |  8<L>  ----------------------------<  97  4.6   |  26  |  0.6<L>    Ca    7.9<L>      10 Vu 2021 01:30  Phos  2.7     06-07  Mg     1.8     06-10    TPro  5.4<L> [6.0 - 8.0]  /  Alb  2.5<L> [3.5 - 5.2]  /  TBili  0.2 [0.2 - 1.2]  /  DBili  x   /  AST  19 [0 - 41]  /  ALT  19 [0 - 41]  /  AlkPhos  131<H> [30 - 115]  06-10          RADIOLOGY & ADDITIONAL TESTS:  CXR:  < from: Xray Chest 1 View- PORTABLE-Routine (Xray Chest 1 View- PORTABLE-Routine in AM.) (06.10.21 @ 06:03) >    Impression:     small left apical pneumothorax. Air gap 10 mm.    Stable bilateral opacities.    < end of copied text >        MEDICATIONS  (STANDING):  cefepime   IVPB 2000 milliGRAM(s) IV Intermittent every 8 hours  enoxaparin Injectable 40 milliGRAM(s) SubCutaneous daily  HYDROmorphone PCA (1 mG/mL) 30 milliLiter(s) PCA Continuous PCA Continuous  metroNIDAZOLE  IVPB 500 milliGRAM(s) IV Intermittent every 8 hours  pantoprazole  Injectable 40 milliGRAM(s) IV Push two times a day  polyethylene glycol 3350 17 Gram(s) Oral daily  senna 2 Tablet(s) Oral at bedtime  sodium chloride 0.9%. 1000 milliLiter(s) (50 mL/Hr) IV Continuous <Continuous>  sucralfate 1 Gram(s) Oral every 6 hours    MEDICATIONS  (PRN):  acetaminophen   Tablet .. 650 milliGRAM(s) Oral every 6 hours PRN Temp greater or equal to 38C (100.4F), Mild Pain (1 - 3), Moderate Pain (4 - 6)  naloxone Injectable 0.1 milliGRAM(s) IV Push every 3 minutes PRN For ANY of the following changes in patient status:  A. RR LESS THAN 10 breaths per minute, B. Oxygen saturation LESS THAN 90%, C. Sedation score of 6  ondansetron Injectable 4 milliGRAM(s) IV Push every 6 hours PRN Nausea      Allergies    No Known Allergies    Intolerances      Ambulation/Activity Status: ambulate     Assessment/Plan:  40y Male status-post left decortication POD#1  - Case and plan discussed with CT Surgeon - Dr. Samuel Villalba  - Continue supportive care    - Continue DVT/GI prophylaxis  - Incentive Spirometry 10 times an hour  - Continue to advance physical activity as tolerated and continue PT/OT as directed  1. Cont abx as per ID  2. Keep chest tubes on suction today  3. regular diet  4. pain control via pca   5. downgrade to 4C
Registered Dietitian Follow-Up -- assessment completed by Alejandra Holguin     Patient Profile Reviewed                           Yes [x]   No []     Nutrition History Previously Obtained        Yes []  No [x] -- pt off unit in OR at time of initial      Pertinent Subjective Information: Pt reports good appetite and po intake PTA. Occasionally drinks ensure enlive for vitamins/minerals. No chewing/swallowing issues. Confirms NKFA. -230lbs, reports wt has been stable within that range with no recent wt loss. ?accuracy of dosing wt.    In house appetite and po intake are good, eating >75% of his meals. LBM 6/9, pt reports no BM for a few days but does not feel constipation. Miralax has been given to the pt.      Pertinent Medical Interventions:  left emphysema  s/p left VATS with decortication     Diet order: regular      Anthropometrics:  - Ht. 72"  - Wt.  175lbs (6/9); dosing  215lbs (6/11) -- closer to pt's stated UBW   - %wt change  - BMI 29 -- based on 215lbs   - IBW 178lbs     Pertinent Lab Data: (6/12) H/H 8.5/29.3, Mg 1.7      Pertinent Meds: lovenox, abx, sodium chloride 0.9%. zofran, protonix, miralax, senna      Physical Findings:  - Appearance: alert. No edema noted per RN flow sheets  - GI function: LBM 6/9, no c/o N+V, does not report constipation   - Tubes: N/A   - Oral/Mouth cavity: no issues chewing/swallowing   - Skin: WDL      Nutrition Requirements  Weight Used: 79.4kg CBW -- continued from initial RD assessment (6/9)      est kcal needs = 2526-6821 kcal/day (MSJ x1.2-1.3);   est protein needs = 79-95g/day (1.0-1.1 g/kg CBW);   est fluid needs = per LIP     Nutrient Intake: >75% per pt         [x] Previous Nutrition Diagnosis: Inadequate Oral Intake            [] Ongoing          [x] Resolved       Nutrition Intervention: meals and snacks, nutrition related medication management      Goal/Expected Outcome: Pt to meet >75% of est. needs within 7 days      Indicator/Monitoring: energy intake, body composition, NFPF, electrolyte profile     Recommendations:  Continue current diet order as tolerated  Consider more aggressive bowel regimen if no BM persists
Surgery is now scheduled for tomorrow morning. NPO after MN tonight. Patient and resident notified.
Surgery post-op Note 06-09-21 @ 18:48    Procedure: left vats, decortication    S: 40yMale s/p  eft vats, decortication. Patient is laying comfortably in the bed. Denies any pain at the incision site, fever, chills, chest pain, shortness of breath.    O:   T(C): 36.7 (06-09-21 @ 18:03), Max: 38.3 (06-08-21 @ 19:59)  HR: 90 (06-09-21 @ 18:30) (62 - 106)  BP: 111/76 (06-09-21 @ 18:30) (84/57 - 136/83)  RR: 38 (06-09-21 @ 18:30) (10 - 38)  SpO2: 95% (06-09-21 @ 18:30) (94% - 99%)    Physical Exam:  General: AAOx 3. NAD  Heart: S1 and S2 noted  Lungs: Clear to auscultation bilaterally. 2 CT to suction, -airleak  Abdomen: Soft Non tender, Non distended.   Extremities: Normal in color and temperature. Motor and sensory grosly intact  Wound: No bleeding or discharge noted from the incision site, no hematoma noted    06-09-21 @ 07:01  -  06-09-21 @ 18:48  --------------------------------------------------------  IN: 1000 mL / OUT: 860 mL / NET: 140 mL    acetaminophen   Tablet .. 650 milliGRAM(s) Oral every 6 hours PRN  cefepime   IVPB 2000 milliGRAM(s) IV Intermittent every 8 hours  enoxaparin Injectable 40 milliGRAM(s) SubCutaneous daily  HYDROmorphone PCA (1 mG/mL) 30 milliLiter(s) PCA Continuous PCA Continuous  metroNIDAZOLE  IVPB 500 milliGRAM(s) IV Intermittent every 8 hours  naloxone Injectable 0.1 milliGRAM(s) IV Push every 3 minutes PRN  ondansetron Injectable 4 milliGRAM(s) IV Push every 6 hours PRN  pantoprazole  Injectable 40 milliGRAM(s) IV Push two times a day  polyethylene glycol 3350 17 Gram(s) Oral daily  senna 2 Tablet(s) Oral at bedtime  sucralfate 1 Gram(s) Oral every 6 hours    < from: Xray Chest 1 View- PORTABLE-Urgent (Xray Chest 1 View- PORTABLE-Urgent .) (06.09.21 @ 13:18) >    Impression:    Small left apical pneumothorax with an air gap of 6 mm.    Improved aeration to the left lung with residual left mid and lower lung field opacity    Residual bilateral interstitial opacities    < end of copied text >        Assessment:   39 y/o Male s/p  eft vats, decortication.    Plan:  2 CT to suction  Daily AM cxr  follow up Chest tube output  monitor for airleak  monitor O2 sat  incentive spirometry  pain control  SCDs  DVT/GI prophylaxis  velvet loyola at midnight
PACU ANESTHESIA ADMISSION NOTE      Procedure: bronchoscopy, left thoracoscopy, decortication    Post op diagnosis:  Empyema    _x___  Patent Airway    __x__  Full return of protective reflexes    __x__  Full recovery from anesthesia / back to baseline status    Vitals:  T(C): 98.3 F  HR:102  BP: 105/58  RR: 17  SpO2: 96%    Mental Status:  __x__ Awake   __x___ Alert   _____ Drowsy   _____ Sedated    Nausea/Vomiting:  _x___ NO  ______Yes,   See Post - Op Orders          Pain Scale (0-10):  _____    Treatment: ____ None    ___x_ See Post - Op/PCA Orders    Post - Operative Fluids:   ____ Oral   __x__ See Post - Op Orders    Plan: Discharge:   ____Home       _____Floor     _x____Critical Care    _____ Other:_________________    Comments: uneventful anesthesia course no complications. Vitals stable. Pt transferred to PACU. Sign out given to CTU service, pt stable for transfer of care to CTU.
Pre-Op Note    Patient: PAVAN SOUZA  MRN: [id number]  40y Male  Location: [facility] [id number] B  21 @ 14:56    Admit Diagnosis: PNA        Procedure: For left VATS and decortication  Consent in Chart: [  ] Yes [ x ] No  Diet: Diet, NPO after Midnight:      NPO Start Date: 2021,   NPO Start Time: 23:59 (21 @ 14:12)  Diet, NPO after Midnight:      NPO Start Date: 2021,   NPO Start Time: 23:59 (21 @ 13:05)  Diet, NPO after Midnight:      NPO Start Date: 2021,   NPO Start Time: 23:59 (21 @ 15:18)    Fluids: sodium chloride 0.9%. 1000 milliLiter(s) (100 mL/Hr) IV Continuous <Continuous>    EK Lead ECG:   Ventricular Rate 108 BPM    Atrial Rate 108 BPM    P-R Interval 120 ms    QRS Duration 94 ms    Q-T Interval 494 ms    QTC Calculation(Bazett) 661 ms    P Axis 28 degrees    R Axis 53 degrees    T Axis 41 degrees    Diagnosis Line Sinus tachycardia  ST & T wave abnormality, consider inferolateral ischemia  Prolonged QT  Abnormal ECG    Confirmed by AUGUSTINE MOORE MD ([id number]) on 2021 9:05:37 AM (21 @ 15:34)    CXR:  Xray Chest 1 View- PORTABLE-Routine:   EXAM:  XR CHEST PORTABLE ROUTINE 1V            PROCEDURE DATE:  2021            INTERPRETATION:   Clinical History/Reason for Exam: Pneumonia, s/p thoracentesis    Comparison: Chest radiograph 2021.    Findings:    Technique/Positioning:Frontal radiograph of the chest.    Support devices:  none    Cardiac/mediastinum/hilum: Partially obscured by left effusion.    Lung parenchyma/ Pleura: Stable large left pleural effusion. Stable patchy interstitial opacities. No pneumothorax.      Skeleton/soft tissues: No focal skeletal lesions are identified.      Impression:    Stable large left pleural effusion.              CARL MTAIAS M.D., ATTENDING RADIOLOGIST  This document has been electronically signed. 2021  9:04AM (21 @ 06:34)      Vitals:  T(F): 99 (06-08-21 @ 13:13), Max: 99.6 (21 @ 20:06)  HR: 95 (21 @ 13:13) (95 - 114)  BP: 126/85 (21 @ 13:13) (126/85 - 145/79)  RR: 20 (21 @ 13:13) (20 - 20)  SpO2: --    Pre-OP Labs:  CAPILLARY BLOOD GLUCOSE                              7.4    19.23 )-----------( 515      ( 2021 07:33 )             25.9     06-08    140  |  100  |  9<L>  ----------------------------<  71  4.0   |  28  |  0.7    Ca    7.5<L>      2021 07:33  Phos  2.7       Mg     2.0     -08    TPro  5.4<L>  /  Alb  2.6<L>  /  TBili  0.2  /  DBili  x   /  AST  25  /  ALT  24  /  AlkPhos  173<H>  06-08    PT/INR - ( 2021 17:53 )   PT: 15.50 sec;   INR: 1.35 ratio         PTT - ( 2021 17:53 )  PTT:30.1 sec      Type & Screen: ABO RH Interpretation: O POS (21 @ 11:53)      Pregnancy Test: N/A    COVID: COVID-19 PCR: NotDetec (2021 18:25)  COVID-19 PCR: NotDetec (20 May 2021 13:35)      Needs repeat COVID PCR

## 2021-06-14 ENCOUNTER — TRANSCRIPTION ENCOUNTER (OUTPATIENT)
Age: 41
End: 2021-06-14

## 2021-06-14 VITALS
TEMPERATURE: 98 F | SYSTOLIC BLOOD PRESSURE: 114 MMHG | HEART RATE: 97 BPM | RESPIRATION RATE: 18 BRPM | DIASTOLIC BLOOD PRESSURE: 72 MMHG

## 2021-06-14 LAB
CULTURE RESULTS: SIGNIFICANT CHANGE UP
CULTURE RESULTS: SIGNIFICANT CHANGE UP
SPECIMEN SOURCE: SIGNIFICANT CHANGE UP
SPECIMEN SOURCE: SIGNIFICANT CHANGE UP

## 2021-06-14 PROCEDURE — 93010 ELECTROCARDIOGRAM REPORT: CPT

## 2021-06-14 PROCEDURE — 71045 X-RAY EXAM CHEST 1 VIEW: CPT | Mod: 26

## 2021-06-14 RX ORDER — ACETAMINOPHEN 500 MG
2 TABLET ORAL
Qty: 0 | Refills: 0 | DISCHARGE
Start: 2021-06-14

## 2021-06-14 RX ADMIN — Medication 650 MILLIGRAM(S): at 01:49

## 2021-06-14 RX ADMIN — Medication 100 MILLIGRAM(S): at 13:30

## 2021-06-14 RX ADMIN — CEFEPIME 100 MILLIGRAM(S): 1 INJECTION, POWDER, FOR SOLUTION INTRAMUSCULAR; INTRAVENOUS at 13:30

## 2021-06-14 RX ADMIN — PANTOPRAZOLE SODIUM 40 MILLIGRAM(S): 20 TABLET, DELAYED RELEASE ORAL at 05:18

## 2021-06-14 RX ADMIN — GABAPENTIN 400 MILLIGRAM(S): 400 CAPSULE ORAL at 13:30

## 2021-06-14 RX ADMIN — METHADONE HYDROCHLORIDE 10 MILLIGRAM(S): 40 TABLET ORAL at 10:01

## 2021-06-14 RX ADMIN — CEFEPIME 100 MILLIGRAM(S): 1 INJECTION, POWDER, FOR SOLUTION INTRAMUSCULAR; INTRAVENOUS at 05:19

## 2021-06-14 RX ADMIN — METHOCARBAMOL 500 MILLIGRAM(S): 500 TABLET, FILM COATED ORAL at 05:15

## 2021-06-14 RX ADMIN — Medication 650 MILLIGRAM(S): at 05:16

## 2021-06-14 RX ADMIN — Medication 100 MILLIGRAM(S): at 05:18

## 2021-06-14 RX ADMIN — ENOXAPARIN SODIUM 40 MILLIGRAM(S): 100 INJECTION SUBCUTANEOUS at 11:09

## 2021-06-14 RX ADMIN — Medication 1 GRAM(S): at 01:49

## 2021-06-14 RX ADMIN — Medication 650 MILLIGRAM(S): at 09:10

## 2021-06-14 RX ADMIN — Medication 650 MILLIGRAM(S): at 02:30

## 2021-06-14 RX ADMIN — GABAPENTIN 400 MILLIGRAM(S): 400 CAPSULE ORAL at 05:15

## 2021-06-14 RX ADMIN — METHOCARBAMOL 500 MILLIGRAM(S): 500 TABLET, FILM COATED ORAL at 13:30

## 2021-06-14 RX ADMIN — Medication 1 GRAM(S): at 05:15

## 2021-06-14 RX ADMIN — Medication 650 MILLIGRAM(S): at 13:30

## 2021-06-14 RX ADMIN — Medication 650 MILLIGRAM(S): at 15:18

## 2021-06-14 RX ADMIN — Medication 650 MILLIGRAM(S): at 06:56

## 2021-06-14 RX ADMIN — Medication 650 MILLIGRAM(S): at 09:53

## 2021-06-14 RX ADMIN — Medication 1 GRAM(S): at 11:09

## 2021-06-14 NOTE — DISCHARGE NOTE PROVIDER - HOSPITAL COURSE
40 M PMH opoid use, peptic ulcer disease presented for fever, chill, malaise. Outpatient dx with multifocal pneumonia. Used opiates 1 week prior, associated with coffee ground emesis episode at the time and a melena episode. In ED, found to be septic on admission (tachycardia 117, WBC 18k,   was seen by GI whore recommended needs OP Rx of H pylori and eradication. patient to see his GI doctor Kiet outpatient for further treatment  CT surgery was consulted for L pleural effusion with loculation and pleural thickening s/f empyema. He went to OR for VATS, decortication on 6/9.    patient with hx or drug abuse and was seen by Pain management who recommended Start methadone. He will follow up outpatient in methadone clinic. All the contact info given to the patient and appointment is set up for 6/15 @ 7.00 a.m.   Per infection disease doctor Julee patient can go home on PO Clindamycin x 4 weeks since the date of the surgery.

## 2021-06-14 NOTE — DISCHARGE NOTE PROVIDER - NSDCMRMEDTOKEN_GEN_ALL_CORE_FT
acetaminophen 325 mg oral tablet: 2 tab(s) orally every 6 hours, As Needed  clindamycin 300 mg oral capsule: 2 cap(s) orally 3 times a day   pantoprazole 40 mg oral delayed release tablet: 1 tab(s) orally every 12 hours  sucralfate 1 g/10 mL oral suspension: 10 milliliter(s) orally every 6 hours

## 2021-06-14 NOTE — DISCHARGE NOTE PROVIDER - NSDCFUADDINST_GEN_ALL_CORE_FT
FOLLOW UP:  1. Follow up with Dr Samuel Villalba in 1-2 weeks. Call office for appointment. call [phone]  2. Follow up with your Primary MD within 1 week  3. Follow up your GI Dr Santa. you may need outpatient treatment for H.Pylori as discussesd  4. Keep wound clean and dry. can cover with gauze and tape.  5. If experience fever, chest pain, shortness of breath, dizziness, vomiting , bleeding or drainage from wound call Primary MD or return to ED  6. Take tylenol as needed for pain.   7. follow up in methadone clinic tomorrow at 7AM.    8. please continue taking you regular home medications

## 2021-06-14 NOTE — PROGRESS NOTE ADULT - PROVIDER SPECIALTY LIST ADULT
Infectious Disease
CT Surgery
Critical Care
Internal Medicine
Addiction Medicine
CT Surgery
Internal Medicine
Pulmonology
CT Surgery
Infectious Disease
Internal Medicine
Thoracic Surgery
Internal Medicine
Pulmonology
Infectious Disease

## 2021-06-14 NOTE — PROGRESS NOTE ADULT - NSICDXPILOT_GEN_ALL_CORE
[place]

## 2021-06-14 NOTE — DISCHARGE NOTE PROVIDER - NSDCCPCAREPLAN_GEN_ALL_CORE_FT
PRINCIPAL DISCHARGE DIAGNOSIS  Diagnosis: Pneumonia  Assessment and Plan of Treatment: left emyema, s/p left VATS, decortication  plan:   FOLLOW UP:  1. Follow up with Dr Samuel Villalba in 1-2 weeks. Call office for appointment. call [phone]  2. Follow up with your Primary MD within 1 week  3. Follow up your GI Dr Snata. you may need outpatient treatment for H.Pylori as discussesd  4. Keep wound clean and dry. can cover with gauze and tape.  5. If experience fever, chest pain, shortness of breath, dizziness, vomiting , bleeding or drainage from wound call Primary MD or return to ED  6. Take tylenol as needed for pain.   7. follow up in methadone clinic tomorrow at 7AM as discussed  8. please continue taking you regular home medications

## 2021-06-14 NOTE — PROGRESS NOTE ADULT - TIME BILLING
I have personally seen and examined this patient.    I have reviewed all pertinent clinical information and reviewed all relevant imaging and diagnostic studies personally.   I counseled the patient about diagnostic testing and treatment plan. All questions were answered.   I discussed recommendations with the primary team.
Mr. Malin is a 40 years old male with diagnosis of left empyema.  Patient refers 7 days of worsening shortness of breath, cough and low-grade fever, seen by PCP started on oral antibiotics without improvement.  Chest CT revealed large loculated left pleural effusion, thoracentesis by Pulmonary with improvement in respiratory distress but unchanged CXR.  Fluid analysis compatible with complicated exudate, waiting for cultures.  I reviewed the images and examined the patient, I recommended surgical decortication to achieve infection control and adequate lung re-expansion.  Patient understood and agreed.      06/09/21: OR for VATS/decortication     06/11/21: recovering well, pain under control, chest tubes in place, scant output, no air leak. CXR with adequate lung re-expansion     Plan:   Chest tube to water seal  OOB   Incentive spirometry
I have personally seen and examined this patient.    I have reviewed all pertinent clinical information and reviewed all relevant imaging and diagnostic studies personally.   I counseled the patient about diagnostic testing and treatment plan. All questions were answered.   I discussed recommendations with the primary team.
I have personally seen and examined this patient.    I have reviewed all pertinent clinical information and reviewed all relevant imaging and diagnostic studies personally.   I counseled the patient about diagnostic testing and treatment plan. All questions were answered.   I discussed recommendations with the primary team.
Mr. Malin is a 40 years old male with diagnosis of left empyema.  Patient refers 7 days of worsening shortness of breath, cough and low-grade fever, seen by PCP started on oral antibiotics without improvement.  Chest CT revealed large loculated left pleural effusion, thoracentesis by Pulmonary with improvement in respiratory distress but unchanged CXR.  Fluid analysis compatible with complicated exudate, waiting for cultures.  I reviewed the images and examined the patient, I recommended surgical decortication to achieve infection control and adequate lung re-expansion.  Patient understood and agreed.   06/09/21: OR for VATS/decortication  06/10/21: recovering well, pain under control, chest tubes in place, scant output, small intermittent air leak. CXR with adequate lung re-expansion  Plan:  Continue chest tube to suction  OOB  Transfer to floor  Incentive spirometry
Mr. Malin is a 40 years old male with diagnosis of left empyema.  Patient refers 7 days of worsening shortness of breath, cough and low-grade fever, seen by PCP started on oral antibiotics without improvement.  Chest CT revealed large loculated left pleural effusion, thoracentesis by Pulmonary with improvement in respiratory distress but unchanged CXR.  Fluid analysis compatible with complicated exudate, waiting for cultures.  I reviewed the images and examined the patient, I recommended surgical decortication to achieve infection control and adequate lung re-expansion.  Patient understood and agreed.  Plan: OR when room available.
Mr. Malin is a 40 years old male with diagnosis of left empyema.  Patient refers 7 days of worsening shortness of breath, cough and low-grade fever, seen by PCP started on oral antibiotics without improvement.  Chest CT revealed large loculated left pleural effusion, thoracentesis by Pulmonary with improvement in respiratory distress but unchanged CXR.  Fluid analysis compatible with complicated exudate, waiting for cultures.  I reviewed the images and examined the patient, I recommended surgical decortication to achieve infection control and adequate lung re-expansion.  Patient understood and agreed.     06/09/21: OR for VATS/decortication    06/13/21: recovering well, pain under control, chest tube in place, scant output, no air leak. CXR with adequate lung re-expansion    Resolving leukocytosis, currently [id number]  Plan:    Remove chest tube  OOB    Incentive spirometry
Mr. Malin is a 40 years old male with diagnosis of left empyema.  Patient refers 7 days of worsening shortness of breath, cough and low-grade fever, seen by PCP started on oral antibiotics without improvement.  Chest CT revealed large loculated left pleural effusion, thoracentesis by Pulmonary with improvement in respiratory distress but unchanged CXR.  Fluid analysis compatible with complicated exudate, waiting for cultures.  I reviewed the images and examined the patient, I recommended surgical decortication to achieve infection control and adequate lung re-expansion.  Patient understood and agreed.        06/09/21: OR for VATS/decortication       06/14/21: recovering well, pain under control, chest tubes removed. CXR with adequate lung re-expansion       Resolving leukocytosis, currently [id number]     Plan:       D/C home    OOB       Incentive spirometry

## 2021-06-14 NOTE — DISCHARGE NOTE PROVIDER - CARE PROVIDER_API CALL
Adrian Baker)  Surgery; Thoracic and Cardiac Surgery  [address] [place] [address]  Phone: [phone]  Fax: [phone]  Follow Up Time:

## 2021-06-14 NOTE — PROGRESS NOTE ADULT - ASSESSMENT
ASSESSMENT:  Mr. Malin is a 40 years old male with diagnosis of left empyema. s/p left VATS with decortication  All chest tubes removed    Plan  Daily AM cxr  monitor O2 sat  incentive spirometry  pain control  SCDs  DVT/GI prophylaxis  Passed TOV  f/u urin strep Ag.      Date/Time: 06-14-21 @ 07:52

## 2021-06-14 NOTE — PROGRESS NOTE ADULT - ASSESSMENT
ASSESSMENT  40 M PMH opoid use, peptic ulcer disease (last 15 years, found to have bleeding ulcer with Dr. Shah [facility] on endoscopy 2 month ago, recent admission for anemia with endoscopy showing hill grade 4 ) presents with cough, dyspnea,and chest pain    IMPRESSION  #Sepsis on admission (Pulse>90, WBC>12) secondary aspiration pneumonia, rule out empyema   - CT Chest w/ IV Cont (06.05.21 @ 20:08): Bilateral pneumonia, complicated on the left by pleural effusion with mild loculation and pleural thickening suspicious for empyema.  - WBC Count: 18.93 K/uL (06.05.21 @ 16:30)  - s/p VATS 6/9 -- Left Empyema with extensive adhesions/bands  - Legionella Antigen, Urine: Negative . (06.06.21 @ 16:50)  - HIV negative    #PUD  #Possible GI BLeed  #Opioid use    ABX allergies: NKDA    Creatinine, Serum: 0.7 mg/dL (06.06.21 @ 05:24)  Weight (kg): 79.4 (05 Jun 2021 15:30)  CrCl 154    RECOMMENDATIONS  - notified by team that patient with Hx of IVDA in past although following with methadone now -- chest tube removed and has defervesced, tolerating PO  - can complete course with clindamycin 600 mg TID PO which has good lung penetration   - plan for 4 weeks from time of VATS with serial imaging to help guide final course    - Weekly CBC, CMP, ESR/CRP  - ID follow-up with Dr. Juan Mora for Telehealth. We will call the patient between 10:30-1:30      [address]       [phone]       Fax [phone]      Please call or message on [employer] Teams if with any questions.  Spectra [phone]

## 2021-06-14 NOTE — PROGRESS NOTE ADULT - SUBJECTIVE AND OBJECTIVE BOX
PAVAN SOUZA  40y, Male  Allergy: No Known Allergies      LOS  9d    CHIEF COMPLAINT: Pneumonia (14 Jun 2021 16:11)      INTERVAL EVENTS/HPI  - No acute events overnight  - T(F): , Max: 98.9 (06-13-21 @ 21:00)  - Denies any worsening symptoms  - Tolerating medication  - WBC Count: 12.85 (06-13-21 @ 21:01)  WBC Count: 14.98 (06-12-21 @ 20:00)     - Creatinine, Serum: 0.7 (06-13-21 @ 21:01)  Creatinine, Serum: 0.7 (06-12-21 @ 20:00)       ROS  General: Denies rigors, nightsweats  HEENT: Denies headache, rhinorrhea, sore throat, eye pain  CV: Denies CP, palpitations  PULM: Denies wheezing, hemoptysis  GI: Denies hematemesis, hematochezia, melena  : Denies discharge, hematuria  MSK: Denies arthralgias, myalgias  SKIN: Denies rash, lesions  NEURO: Denies paresthesias, weakness  PSYCH: Denies depression, anxiety    VITALS:  T(F): 98.2, Max: 98.9 (06-13-21 @ 21:00)  HR: 97  BP: 114/72  RR: 18Vital Signs Last 24 Hrs  T(C): 36.8 (14 Jun 2021 12:16), Max: 37.2 (13 Jun 2021 21:00)  T(F): 98.2 (14 Jun 2021 12:16), Max: 98.9 (13 Jun 2021 21:00)  HR: 97 (14 Jun 2021 12:16) (84 - 103)  BP: 114/72 (14 Jun 2021 12:16) (109/67 - 131/74)  BP(mean): --  RR: 18 (14 Jun 2021 12:16) (18 - 18)  SpO2: --    PHYSICAL EXAM:  Gen: NAD, resting in bed  HEENT: Normocephalic, atraumatic  Neck: supple, no lymphadenopathy  CV: Regular rate & regular rhythm  Lungs: decreased BS at bases, no fremitus  Abdomen: Soft, BS present  Ext: Warm, well perfused  Neuro: non focal, awake  Skin: no rash, no erythema  Lines: no phlebitis    FH: Non-contributory  Social Hx: Non-contributory    TESTS & MEASUREMENTS:                        9.0    12.85 )-----------( 800      ( 13 Jun 2021 21:01 )             31.2     06-13    135  |  99  |  9<L>  ----------------------------<  117<H>  5.0   |  27  |  0.7    Ca    8.7      13 Jun 2021 21:01  Phos  3.4     06-13  Mg     1.9     06-13      eGFR if Non African American: 116 mL/min/1.73M2 (06-13-21 @ 21:01)  eGFR if : 135 mL/min/1.73M2 (06-13-21 @ 21:01)          Culture - Tissue with Gram Stain (collected 06-09-21 @ 08:58)  Source: .Tissue None  Gram Stain (06-09-21 @ 23:59):    No polymorphonuclear cells seen per low power field    No organisms seen per oil power field  Preliminary Report (06-10-21 @ 15:53):    No growth to date.    Culture - Body Fluid with Gram Stain (collected 06-09-21 @ 08:58)  Source: .Body Fluid None  Gram Stain (06-09-21 @ 23:25):    polymorphonuclear leukocytes seen    No organisms seen    by cytocentrifuge  Preliminary Report (06-10-21 @ 16:52):    No growth    Culture - Bronchial (collected 06-09-21 @ 08:58)  Source: .Bronchial None  Gram Stain (06-09-21 @ 23:48):    Moderate polymorphonuclear leukocytes per low power field    No squamous epithelial cells per low power field    No organisms seen per oil power field  Final Report (06-11-21 @ 18:25):    No growth    Culture - Fungal, Body Fluid (collected 06-07-21 @ 11:20)  Source: .Body Fluid Pleural Fluid  Preliminary Report (06-08-21 @ 07:46):    Testing in progress    Culture - Body Fluid with Gram Stain (collected 06-07-21 @ 11:20)  Source: .Body Fluid Pleural Fluid  Gram Stain (06-07-21 @ 23:44):    polymorphonuclear leukocytes seen    No organisms seen    by cytocentrifuge  Final Report (06-12-21 @ 17:52):    No growth at 5 days    Culture - Blood (collected 06-05-21 @ 16:30)  Source: .Blood Blood  Final Report (06-11-21 @ 01:00):    No Growth Final    Culture - Blood (collected 06-05-21 @ 16:30)  Source: .Blood Blood  Final Report (06-11-21 @ 01:00):    No Growth Final            INFECTIOUS DISEASES TESTING  COVID-19 PCR: NotDetec (06-08-21 @ 18:12)  MRSA PCR Result.: Negative (06-07-21 @ 06:30)  Legionella Antigen, Urine: Negative (06-06-21 @ 16:50)  Procalcitonin, Serum: 0.63 (06-06-21 @ 05:24)  Rapid HIV-1/2 Antibody: Nonreact (06-06-21 @ 05:24)  COVID-19 PCR: NotDetec (06-05-21 @ 18:25)  COVID-19 PCR: NotDetec (05-20-21 @ 13:35)      INFLAMMATORY MARKERS      RADIOLOGY & ADDITIONAL TESTS:  I have personally reviewed the last available Chest xray  CXR  Xray Chest 1 View- PORTABLE-Urgent:   EXAM:  XR CHEST PORTABLE URGENT 1V            PROCEDURE DATE:  06/13/2021            INTERPRETATION:  Clinical History / Reason for exam: Post chest tube removal    Comparison : Chest radiograph 1/13/2021.    Technique/Positioning: Single AP view of the chest.    Findings:    Support devices: Interval removal of left basilar chest tube    Cardiac/mediastinum/hilum: Cardiomegaly unchanged.    Lung parenchyma/Pleura: Stable bilateral opacities and small left effusion. No pneumothorax visualized.    Skeleton/soft tissues: Left chest wall subcutaneous emphysema, unchanged.    Impression:    Stable bilateral opacities and small left effusion.    No pneumothorax identified.              ELLIOT LANDAU MD; Attending Radiologist  This document has been electronically signed. Jun 14 2021  9:14AM (06-13-21 @ 14:45)      CT      CARDIOLOGY TESTING  12 Lead ECG:   Ventricular Rate 91 BPM    Atrial Rate 91 BPM    P-R Interval 122 ms    QRS Duration 96 ms    Q-T Interval 358 ms    QTC Calculation(Bazett) 440 ms    P Axis 11 degrees    R Axis 58 degrees    T Axis 20 degrees    Diagnosis Line Normal sinus rhythm  RSR' or QR pattern in V1 suggests right ventricular conduction delay  Nonspecific T wave abnormality  Abnormal ECG    Confirmed by Tomasa Estevez MD ([id number]) on 6/14/2021 8:51:57 AM (06-14-21 @ 07:53)  12 Lead ECG:   Ventricular Rate 100 BPM    Atrial Rate 100 BPM    P-R Interval 116 ms    QRS Duration 84 ms    Q-T Interval 346 ms    QTC Calculation(Bazett) 446 ms    P Axis 15 degrees    R Axis 47 degrees    T Axis 10 degrees    Diagnosis Line Normal sinus rhythm  Early transition  Nonspecific ST and T wave abnormality  Anterolateral ischemia Possible  Abnormal ECG    Confirmed by ANSLEY MARTINEZ MD ([id number]) on 6/12/2021 2:48:10 AM (06-11-21 @ 18:06)      MEDICATIONS  acetaminophen   Tablet .. 650 Oral every 4 hours  cefepime   IVPB 2000 IV Intermittent every 8 hours  enoxaparin Injectable 40 SubCutaneous daily  gabapentin 400 Oral three times a day  methocarbamol 500 Oral every 8 hours  metroNIDAZOLE  IVPB 500 IV Intermittent every 8 hours  pantoprazole  Injectable 40 IV Push two times a day  polyethylene glycol 3350 17 Oral daily  senna 2 Oral at bedtime  sucralfate 1 Oral every 6 hours      WEIGHT  Weight (kg): 79.4 (06-09-21 @ 18:03)  Creatinine, Serum: 0.7 mg/dL (06-13-21 @ 21:01)      ANTIBIOTICS:  cefepime   IVPB 2000 milliGRAM(s) IV Intermittent every 8 hours  metroNIDAZOLE  IVPB 500 milliGRAM(s) IV Intermittent every 8 hours      All available historical records have been reviewed

## 2021-06-14 NOTE — DISCHARGE NOTE NURSING/CASE MANAGEMENT/SOCIAL WORK - PATIENT PORTAL LINK FT
You can access the FollowMyHealth Patient Portal offered by [facility] by registering at the following website: [email or web address]. By joining [employer]’s FollowMyHealth portal, you will also be able to view your health information using other applications (apps) compatible with our system.

## 2021-06-14 NOTE — PROGRESS NOTE ADULT - REASON FOR ADMISSION
Pneumonia

## 2021-06-14 NOTE — PROGRESS NOTE ADULT - SUBJECTIVE AND OBJECTIVE BOX
GENERAL SURGERY PROGRESS NOTE    Patient: PAVAN SOUZA , 40y ([birth year])Male   MRN: [id number]  Location: [address]  Visit: 06-05-21 Inpatient  Date: 06-14-21 @ 07:52    Hospital Day #: 10  POD: 5    Procedure/Dx/Injuries: L VATS, decortication    Events of past 24 hours: All chest tubes now removal. Patient stable, no pneumothorax. Pain regimen as per pain team. Will work with case management for possible SNF placement will need long term IV ABx    PAST MEDICAL & SURGICAL HISTORY:  Peptic ulcer disease    No significant past surgical history    Vitals:   T(F): 97.4 (06-14-21 @ 05:00), Max: 98.9 (06-13-21 @ 21:00)  HR: 84 (06-14-21 @ 05:00)  BP: 109/67 (06-14-21 @ 05:00)  RR: 18 (06-14-21 @ 05:00)  SpO2: 95% (06-13-21 @ 11:10)      Diet, Regular      Fluids:     I & O's:    06-13-21 @ 07:01  -  06-14-21 @ 07:00  --------------------------------------------------------  IN:    IV PiggyBack: 150 mL    sodium chloride 0.9%: 150 mL  Total IN: 300 mL    OUT:    Chest Tube (mL): 30 mL    Voided (mL): 4675 mL  Total OUT: 4705 mL    Total NET: -4405 mL      PHYSICAL EXAM:  General Appearance: NAD   HEENT: EOMI, sclera non-icteric.  Heart: RRR   Lungs: CTABL.   Abdomen:  Soft, nontender, nondistended.   MSK/Extremities: Warm & well-perfused.   Skin: Warm, dry. No jaundice.       MEDICATIONS  (STANDING):  acetaminophen   Tablet .. 650 milliGRAM(s) Oral every 4 hours  cefepime   IVPB 2000 milliGRAM(s) IV Intermittent every 8 hours  enoxaparin Injectable 40 milliGRAM(s) SubCutaneous daily  gabapentin 400 milliGRAM(s) Oral three times a day  methocarbamol 500 milliGRAM(s) Oral every 8 hours  metroNIDAZOLE  IVPB 500 milliGRAM(s) IV Intermittent every 8 hours  pantoprazole  Injectable 40 milliGRAM(s) IV Push two times a day  polyethylene glycol 3350 17 Gram(s) Oral daily  senna 2 Tablet(s) Oral at bedtime  sucralfate 1 Gram(s) Oral every 6 hours    MEDICATIONS  (PRN):  HYDROmorphone  Injectable 0.3 milliGRAM(s) IV Push every 8 hours PRN Severe Pain (7 - 10)  ketorolac   Injectable 15 milliGRAM(s) IV Push every 6 hours PRN Mild Pain (1 - 3)  methadone    Tablet 10 milliGRAM(s) Oral every 8 hours PRN Moderate Pain (4 - 6)  naloxone Injectable 0.1 milliGRAM(s) IV Push every 3 minutes PRN For ANY of the following changes in patient status:  A. RR LESS THAN 10 breaths per minute, B. Oxygen saturation LESS THAN 90%, C. Sedation score of 6  ondansetron Injectable 4 milliGRAM(s) IV Push every 6 hours PRN Nausea      DVT PROPHYLAXIS: enoxaparin Injectable 40 milliGRAM(s) SubCutaneous daily    GI PROPHYLAXIS: pantoprazole  Injectable 40 milliGRAM(s) IV Push two times a day    ANTICOAGULATION:   ANTIBIOTICS:  cefepime   IVPB 2000 milliGRAM(s)  metroNIDAZOLE  IVPB 500 milliGRAM(s)        LAB/STUDIES:  Labs:  CAPILLARY BLOOD GLUCOSE                              9.0    12.85 )-----------( 800      ( 13 Jun 2021 21:01 )             31.2         06-13    135  |  99  |  9<L>  ----------------------------<  117<H>  5.0   |  27  |  0.7      Calcium, Total Serum: 8.7 mg/dL (06-13-21 @ 21:01)      LFTs:         Coags:        Serum Pro-Brain Natriuretic Peptide: 59 pg/mL (06-05-21 @ 16:30)            ACCESS/ DEVICES:  [x ] Peripheral IV  [ ] Central Venous Line	[ ] R	[ ] L	[ ] IJ	[ ] Fem	[ ] SC	Placed:   [ ] Arterial Line		[ ] R	[ ] L	[ ] Fem	[ ] Rad	[ ] Ax	Placed:   [ ] PICC:					[ ] Mediport  [ ] Urinary Catheter,  Date Placed:   [ ] Chest tube: [ ] Right, [ ] Left  [ ] YUVAL/Keith Drains

## 2021-06-17 DIAGNOSIS — K44.9 DIAPHRAGMATIC HERNIA WITHOUT OBSTRUCTION OR GANGRENE: ICD-10-CM

## 2021-06-17 DIAGNOSIS — K25.3 ACUTE GASTRIC ULCER WITHOUT HEMORRHAGE OR PERFORATION: ICD-10-CM

## 2021-06-17 DIAGNOSIS — J90 PLEURAL EFFUSION, NOT ELSEWHERE CLASSIFIED: ICD-10-CM

## 2021-06-17 DIAGNOSIS — A41.9 SEPSIS, UNSPECIFIED ORGANISM: ICD-10-CM

## 2021-06-17 DIAGNOSIS — J86.0 PYOTHORAX WITH FISTULA: ICD-10-CM

## 2021-06-17 DIAGNOSIS — D62 ACUTE POSTHEMORRHAGIC ANEMIA: ICD-10-CM

## 2021-06-17 DIAGNOSIS — K22.10 ULCER OF ESOPHAGUS WITHOUT BLEEDING: ICD-10-CM

## 2021-06-17 DIAGNOSIS — K29.70 GASTRITIS, UNSPECIFIED, WITHOUT BLEEDING: ICD-10-CM

## 2021-06-17 DIAGNOSIS — R00.0 TACHYCARDIA, UNSPECIFIED: ICD-10-CM

## 2021-06-17 DIAGNOSIS — J69.0 PNEUMONITIS DUE TO INHALATION OF FOOD AND VOMIT: ICD-10-CM

## 2021-06-17 DIAGNOSIS — F11.20 OPIOID DEPENDENCE, UNCOMPLICATED: ICD-10-CM

## 2021-06-25 ENCOUNTER — OUTPATIENT (OUTPATIENT)
Dept: OUTPATIENT SERVICES | Facility: HOSPITAL | Age: 41
LOS: 1 days | Discharge: HOME | End: 2021-06-25

## 2021-06-25 ENCOUNTER — APPOINTMENT (OUTPATIENT)
Dept: PULMONOLOGY | Facility: CLINIC | Age: 41
End: 2021-06-25
Payer: MEDICAID

## 2021-06-25 VITALS
HEIGHT: 72 IN | DIASTOLIC BLOOD PRESSURE: 74 MMHG | OXYGEN SATURATION: 91 % | SYSTOLIC BLOOD PRESSURE: 114 MMHG | BODY MASS INDEX: 29.66 KG/M2 | HEART RATE: 87 BPM | WEIGHT: 219 LBS | TEMPERATURE: 98.1 F

## 2021-06-25 DIAGNOSIS — Z87.891 PERSONAL HISTORY OF NICOTINE DEPENDENCE: ICD-10-CM

## 2021-06-25 DIAGNOSIS — J90 PLEURAL EFFUSION, NOT ELSEWHERE CLASSIFIED: ICD-10-CM

## 2021-06-25 DIAGNOSIS — J69.0 PNEUMONITIS DUE TO INHALATION OF FOOD AND VOMIT: ICD-10-CM

## 2021-06-25 PROCEDURE — 99204 OFFICE O/P NEW MOD 45 MIN: CPT | Mod: GC

## 2021-06-25 NOTE — ASSESSMENT
[FreeTextEntry1] : 39 y/o M with PMH opioid abuse and peptic ulcer presents for f/u after pneumonia with loculated effusion.\par \par # Aspiration pneumonia with complicated loculated pleural effusion s/p VATS\par - Cytopathology and all cultures negative\par - on PO Clindamycin x 4 weeks to end 7/3. F/u with ID\par - F/u with surgery\par - Obtain repeat chest xray and RTC in 3 months\par \par # H/o smoking\par - Education provided; patient said he will try to cut back over next 3 months

## 2021-06-25 NOTE — HISTORY OF PRESENT ILLNESS
[TextBox_4] : 40 M PMH opoid use, peptic ulcer disease presents for f/u after hospital d/c on 6/15 for aspiration pneumonia with complicated parapneumonic effusion. He presented to hospital on 6/5 and diagnosed with likely aspiration pneumonia 2/2 emesis episode. Was septic on admission and T showed left sided pleural effusion with loculations.\par Thoracentesis showed exudate, no pus.\par CT surgery was consulted  and went to OR for VATS, decortication on 6/9. \par All cultures negative.\par He was discharged on PO Clindamycin x 4 weeks to end 7/3.\par \par He has no complaints at this lori, no fever, no chills, no SOB, no chest pain. He is supposed to f/u with GI, ID, surgery and methadone clinic.\par \par He smokes about 5 cigarettes a day.\par

## 2021-06-25 NOTE — PHYSICAL EXAM
[No Acute Distress] : no acute distress [Well Nourished] : well nourished [Normal Oropharynx] : normal oropharynx [Normal Appearance] : normal appearance [No Neck Mass] : no neck mass [Normal Rate/Rhythm] : normal rate/rhythm [Normal S1, S2] : normal s1, s2 [No Murmurs] : no murmurs [No Resp Distress] : no resp distress [No Acc Muscle Use] : no acc muscle use [Clear to Auscultation Bilaterally] : clear to auscultation bilaterally [No Abnormalities] : no abnormalities [Benign] : benign [Normal Gait] : normal gait [No Clubbing] : no clubbing [No Edema] : no edema [No Focal Deficits] : no focal deficits [No Motor Deficits] : no motor deficits [Oriented x3] : oriented x3 [Normal Mood] : normal mood [Normal Affect] : normal affect [TextBox_68] : left lower lobe dullness and decreased air movement

## 2021-06-28 PROBLEM — Z09 S/P LUNG SURGERY, FOLLOW-UP EXAM: Status: ACTIVE | Noted: 2021-06-28

## 2021-06-29 ENCOUNTER — APPOINTMENT (OUTPATIENT)
Dept: CARDIOTHORACIC SURGERY | Facility: CLINIC | Age: 41
End: 2021-06-29
Payer: MEDICAID

## 2021-06-29 VITALS
SYSTOLIC BLOOD PRESSURE: 106 MMHG | DIASTOLIC BLOOD PRESSURE: 75 MMHG | BODY MASS INDEX: 29.12 KG/M2 | RESPIRATION RATE: 14 BRPM | TEMPERATURE: 98.2 F | WEIGHT: 215 LBS | HEIGHT: 72 IN | HEART RATE: 102 BPM | OXYGEN SATURATION: 97 %

## 2021-06-29 DIAGNOSIS — Z09 ENCOUNTER FOR FOLLOW-UP EXAMINATION AFTER COMPLETED TREATMENT FOR CONDITIONS OTHER THAN MALIGNANT NEOPLASM: ICD-10-CM

## 2021-06-29 DIAGNOSIS — K44.9 DIAPHRAGMATIC HERNIA W/OUT OBSTRUCTION OR GANGRENE: ICD-10-CM

## 2021-06-29 PROCEDURE — 99024 POSTOP FOLLOW-UP VISIT: CPT

## 2021-07-07 LAB
CULTURE RESULTS: SIGNIFICANT CHANGE UP
SPECIMEN SOURCE: SIGNIFICANT CHANGE UP

## 2021-07-09 ENCOUNTER — APPOINTMENT (OUTPATIENT)
Dept: GASTROENTEROLOGY | Facility: CLINIC | Age: 41
End: 2021-07-09
Payer: MEDICAID

## 2021-07-09 VITALS
DIASTOLIC BLOOD PRESSURE: 80 MMHG | WEIGHT: 220 LBS | BODY MASS INDEX: 29.8 KG/M2 | SYSTOLIC BLOOD PRESSURE: 110 MMHG | HEIGHT: 72 IN | TEMPERATURE: 98 F

## 2021-07-09 DIAGNOSIS — Z87.898 PERSONAL HISTORY OF OTHER SPECIFIED CONDITIONS: ICD-10-CM

## 2021-07-09 DIAGNOSIS — Z78.9 OTHER SPECIFIED HEALTH STATUS: ICD-10-CM

## 2021-07-09 DIAGNOSIS — K29.70 GASTRITIS, UNSPECIFIED, W/OUT BLEEDING: ICD-10-CM

## 2021-07-09 DIAGNOSIS — Z87.19 PERSONAL HISTORY OF OTHER DISEASES OF THE DIGESTIVE SYSTEM: ICD-10-CM

## 2021-07-09 DIAGNOSIS — B96.81 GASTRITIS, UNSPECIFIED, W/OUT BLEEDING: ICD-10-CM

## 2021-07-09 DIAGNOSIS — K22.10 ULCER OF ESOPHAGUS W/OUT BLEEDING: ICD-10-CM

## 2021-07-09 DIAGNOSIS — R11.10 VOMITING, UNSPECIFIED: ICD-10-CM

## 2021-07-09 DIAGNOSIS — R11.2 NAUSEA WITH VOMITING, UNSPECIFIED: ICD-10-CM

## 2021-07-09 DIAGNOSIS — R12 HEARTBURN: ICD-10-CM

## 2021-07-09 DIAGNOSIS — K92.1 MELENA: ICD-10-CM

## 2021-07-09 PROCEDURE — 99214 OFFICE O/P EST MOD 30 MIN: CPT

## 2021-07-09 NOTE — PHYSICAL EXAM
[General Appearance - Alert] : alert [General Appearance - Well Nourished] : well nourished [Sclera] : the sclera and conjunctiva were normal [Extraocular Movements] : extraocular movements were intact [Outer Ear] : the ears and nose were normal in appearance [Hearing Threshold Finger Rub Not [place]] : hearing was normal [Neck Appearance] : the appearance of the neck was normal [Neck Cervical Mass (___cm)] : no neck mass was observed [Exaggerated Use Of Accessory Muscles For Inspiration] : no accessory muscle use [Involuntary Movements] : no involuntary movements were seen [Skin Color & Pigmentation] : normal skin color and pigmentation [] : no rash [No Focal Deficits] : no focal deficits [Affect] : the affect was normal

## 2021-07-09 NOTE — ASSESSMENT
[FreeTextEntry1] : HP - untreated\par - will send quadruple therapy x 2 weeks\par - after completion of treatment will schedule EGD but pt will remain on PPI daily during this time\par - if EGD shows esophageal ulcer is healed, can d/c PPI and test stool for HP eradication 2 weeks after

## 2021-07-09 NOTE — HISTORY OF PRESENT ILLNESS
[_________] : Performed [unfilled] [de-identified] : 39 y/o M who presents for follow up. \par \par Pt initially presented 5/2021 with coffee ground emesis, found to have esophagitis, large GEJ ulcer, also found to be HP positive. Pt re-presented to the hospital 6/2021, had empyema requiring thoracic surgery, also was having coffee ground emesis. \par \par Pt is now s/p lung surgery, doing well. Reports no further abd pain or episodes of emesis. He is currently taking pantoprazole daily.

## 2021-07-12 ENCOUNTER — NON-APPOINTMENT (OUTPATIENT)
Age: 41
End: 2021-07-12

## 2021-07-19 ENCOUNTER — OUTPATIENT (OUTPATIENT)
Dept: OUTPATIENT SERVICES | Facility: HOSPITAL | Age: 41
LOS: 1 days | Discharge: HOME | End: 2021-07-19
Payer: COMMERCIAL

## 2021-07-19 ENCOUNTER — OUTPATIENT (OUTPATIENT)
Dept: OUTPATIENT SERVICES | Facility: HOSPITAL | Age: 41
LOS: 1 days | Discharge: HOME | End: 2021-07-19

## 2021-07-19 DIAGNOSIS — Z00.8 ENCOUNTER FOR OTHER GENERAL EXAMINATION: ICD-10-CM

## 2021-07-19 DIAGNOSIS — I49.9 CARDIAC ARRHYTHMIA, UNSPECIFIED: ICD-10-CM

## 2021-07-19 LAB
A1C WITH ESTIMATED AVERAGE GLUCOSE RESULT: 5.5 % — SIGNIFICANT CHANGE UP (ref 4–5.6)
ALBUMIN SERPL ELPH-MCNC: 3.6 G/DL — SIGNIFICANT CHANGE UP (ref 3.5–5.2)
ALP SERPL-CCNC: 70 U/L — SIGNIFICANT CHANGE UP (ref 30–115)
ALT FLD-CCNC: 16 U/L — SIGNIFICANT CHANGE UP (ref 0–41)
ANION GAP SERPL CALC-SCNC: 9 MMOL/L — SIGNIFICANT CHANGE UP (ref 7–14)
APPEARANCE UR: CLEAR — SIGNIFICANT CHANGE UP
AST SERPL-CCNC: 17 U/L — SIGNIFICANT CHANGE UP (ref 0–41)
BILIRUB SERPL-MCNC: <0.2 MG/DL — SIGNIFICANT CHANGE UP (ref 0.2–1.2)
BILIRUB UR-MCNC: NEGATIVE — SIGNIFICANT CHANGE UP
BUN SERPL-MCNC: 10 MG/DL — SIGNIFICANT CHANGE UP (ref 10–20)
CALCIUM SERPL-MCNC: 9.2 MG/DL — SIGNIFICANT CHANGE UP (ref 8.5–10.1)
CHLORIDE SERPL-SCNC: 103 MMOL/L — SIGNIFICANT CHANGE UP (ref 98–110)
CHOLEST SERPL-MCNC: 122 MG/DL — SIGNIFICANT CHANGE UP
CO2 SERPL-SCNC: 27 MMOL/L — SIGNIFICANT CHANGE UP (ref 17–32)
COD CRY URNS QL: ABNORMAL
COLOR SPEC: YELLOW — SIGNIFICANT CHANGE UP
CREAT SERPL-MCNC: 0.7 MG/DL — SIGNIFICANT CHANGE UP (ref 0.7–1.5)
DIFF PNL FLD: ABNORMAL
EPI CELLS # UR: ABNORMAL /HPF
ESTIMATED AVERAGE GLUCOSE: 111 MG/DL — SIGNIFICANT CHANGE UP (ref 68–114)
GLUCOSE SERPL-MCNC: 82 MG/DL — SIGNIFICANT CHANGE UP (ref 70–99)
GLUCOSE UR QL: NEGATIVE MG/DL — SIGNIFICANT CHANGE UP
HCT VFR BLD CALC: 31.5 % — LOW (ref 42–52)
HDLC SERPL-MCNC: 31 MG/DL — LOW
HGB BLD-MCNC: 9.1 G/DL — LOW (ref 14–18)
KETONES UR-MCNC: NEGATIVE — SIGNIFICANT CHANGE UP
LEUKOCYTE ESTERASE UR-ACNC: NEGATIVE — SIGNIFICANT CHANGE UP
LIPID PNL WITH DIRECT LDL SERPL: 52 MG/DL — SIGNIFICANT CHANGE UP
MAGNESIUM SERPL-MCNC: 1.7 MG/DL — LOW (ref 1.8–2.4)
MCHC RBC-ENTMCNC: 21.2 PG — LOW (ref 27–31)
MCHC RBC-ENTMCNC: 28.9 G/DL — LOW (ref 32–37)
MCV RBC AUTO: 73.4 FL — LOW (ref 80–94)
NITRITE UR-MCNC: NEGATIVE — SIGNIFICANT CHANGE UP
NON HDL CHOLESTEROL: 91 MG/DL — SIGNIFICANT CHANGE UP
NRBC # BLD: 0 /100 WBCS — SIGNIFICANT CHANGE UP (ref 0–0)
PH UR: 6.5 — SIGNIFICANT CHANGE UP (ref 5–8)
PLATELET # BLD AUTO: 312 K/UL — SIGNIFICANT CHANGE UP (ref 130–400)
POTASSIUM SERPL-MCNC: 4.4 MMOL/L — SIGNIFICANT CHANGE UP (ref 3.5–5)
POTASSIUM SERPL-SCNC: 4.4 MMOL/L — SIGNIFICANT CHANGE UP (ref 3.5–5)
PROT SERPL-MCNC: 6.3 G/DL — SIGNIFICANT CHANGE UP (ref 6–8)
PROT UR-MCNC: NEGATIVE MG/DL — SIGNIFICANT CHANGE UP
RBC # BLD: 4.29 M/UL — LOW (ref 4.7–6.1)
RBC # FLD: 23.3 % — HIGH (ref 11.5–14.5)
RBC CASTS # UR COMP ASSIST: ABNORMAL /HPF
SODIUM SERPL-SCNC: 139 MMOL/L — SIGNIFICANT CHANGE UP (ref 135–146)
SP GR SPEC: 1.02 — SIGNIFICANT CHANGE UP (ref 1.01–1.03)
TRIGL SERPL-MCNC: 285 MG/DL — HIGH
UROBILINOGEN FLD QL: 0.2 MG/DL — SIGNIFICANT CHANGE UP (ref 0.2–0.2)
WBC # BLD: 7.05 K/UL — SIGNIFICANT CHANGE UP (ref 4.8–10.8)
WBC # FLD AUTO: 7.05 K/UL — SIGNIFICANT CHANGE UP (ref 4.8–10.8)

## 2021-07-20 LAB
HAV IGM SER-ACNC: SIGNIFICANT CHANGE UP
HBV CORE IGM SER-ACNC: SIGNIFICANT CHANGE UP
HBV SURFACE AG SER-ACNC: SIGNIFICANT CHANGE UP
HCV AB S/CO SERPL IA: 0.1 S/CO — SIGNIFICANT CHANGE UP (ref 0–0.99)
HCV AB SERPL-IMP: SIGNIFICANT CHANGE UP
T PALLIDUM AB TITR SER: NEGATIVE — SIGNIFICANT CHANGE UP

## 2021-07-20 PROCEDURE — 93010 ELECTROCARDIOGRAM REPORT: CPT | Mod: NC

## 2021-07-21 LAB
GAMMA INTERFERON BACKGROUND BLD IA-ACNC: 0.03 IU/ML — SIGNIFICANT CHANGE UP
M TB IFN-G BLD-IMP: NEGATIVE — SIGNIFICANT CHANGE UP
M TB IFN-G CD4+ BCKGRND COR BLD-ACNC: 0 IU/ML — SIGNIFICANT CHANGE UP
M TB IFN-G CD4+CD8+ BCKGRND COR BLD-ACNC: 0 IU/ML — SIGNIFICANT CHANGE UP
QUANT TB PLUS MITOGEN MINUS NIL: 7.47 IU/ML — SIGNIFICANT CHANGE UP

## 2021-08-02 ENCOUNTER — NON-APPOINTMENT (OUTPATIENT)
Age: 41
End: 2021-08-02

## 2021-08-25 RX ORDER — METRONIDAZOLE 250 MG/1
250 TABLET ORAL EVERY 6 HOURS
Qty: 56 | Refills: 0 | Status: DISCONTINUED | COMMUNITY
Start: 2021-07-09 | End: 2021-08-25

## 2021-08-25 RX ORDER — BISMUTH SUBSALICYLATE 262 MG/1
262 TABLET, CHEWABLE ORAL 4 TIMES DAILY
Qty: 112 | Refills: 0 | Status: DISCONTINUED | COMMUNITY
Start: 2021-07-09 | End: 2021-08-25

## 2021-08-25 RX ORDER — TETRACYCLINE HYDROCHLORIDE 500 MG/1
500 CAPSULE ORAL EVERY 6 HOURS
Qty: 56 | Refills: 0 | Status: DISCONTINUED | COMMUNITY
Start: 2021-07-09 | End: 2021-08-25

## 2021-09-01 ENCOUNTER — OUTPATIENT (OUTPATIENT)
Dept: OUTPATIENT SERVICES | Facility: HOSPITAL | Age: 41
LOS: 1 days | Discharge: HOME | End: 2021-09-01
Payer: COMMERCIAL

## 2021-09-01 DIAGNOSIS — I49.9 CARDIAC ARRHYTHMIA, UNSPECIFIED: ICD-10-CM

## 2021-09-01 PROCEDURE — 93010 ELECTROCARDIOGRAM REPORT: CPT

## 2021-09-02 ENCOUNTER — APPOINTMENT (OUTPATIENT)
Dept: GASTROENTEROLOGY | Facility: CLINIC | Age: 41
End: 2021-09-02

## 2021-09-03 NOTE — HISTORY OF PRESENT ILLNESS
[Home] : at home, [unfilled] , at the time of the visit. [Medical Office: ([facility])___] : at the medical office located in  [Verbal consent obtained from patient] : the patient, [unfilled] [FreeTextEntry4] : Desiree Walker

## 2021-09-13 ENCOUNTER — APPOINTMENT (OUTPATIENT)
Dept: GASTROENTEROLOGY | Facility: CLINIC | Age: 41
End: 2021-09-13

## 2021-10-26 ENCOUNTER — APPOINTMENT (OUTPATIENT)
Dept: PODIATRY | Facility: CLINIC | Age: 41
End: 2021-10-26

## 2021-11-24 ENCOUNTER — EMERGENCY (EMERGENCY)
Facility: HOSPITAL | Age: 41
LOS: 0 days | Discharge: HOME | End: 2021-11-25
Attending: EMERGENCY MEDICINE | Admitting: EMERGENCY MEDICINE
Payer: MEDICAID

## 2021-11-24 VITALS
RESPIRATION RATE: 18 BRPM | OXYGEN SATURATION: 99 % | DIASTOLIC BLOOD PRESSURE: 58 MMHG | WEIGHT: 220.02 LBS | HEART RATE: 102 BPM | SYSTOLIC BLOOD PRESSURE: 130 MMHG | HEIGHT: 72 IN

## 2021-11-24 DIAGNOSIS — T40.0X1A POISONING BY OPIUM, ACCIDENTAL (UNINTENTIONAL), INITIAL ENCOUNTER: ICD-10-CM
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
## 2021-11-24 PROCEDURE — 93010 ELECTROCARDIOGRAM REPORT: CPT

## 2021-11-24 PROCEDURE — 99284 EMERGENCY DEPT VISIT MOD MDM: CPT

## 2021-11-24 NOTE — ED PROVIDER NOTE - ATTENDING CONTRIBUTION TO CARE
41yM opiate abuse s/p rehab p/w heroin overdose - reports "relapse" tonight but no SI.  Required 3 doses of nacran (IM x 2 then IV) before he woke up.    exam w/ calm sleepy male in NAD  no signs of trauma  +pinpoint pupils

## 2021-11-24 NOTE — ED PROVIDER NOTE - OBJECTIVE STATEMENT
40 y/o M with PMH opiate abuse here for heroin overdose tonight on 2 bags of IV heroin-- received narcan in field 2/2 unresponsiveness.   no complaints.   denies fall/trauma.   denies si/hi/hallucinations-- relates just wants to "sleep it off".

## 2021-11-24 NOTE — ED PROVIDER NOTE - PATIENT PORTAL LINK FT
You can access the FollowMyHealth Patient Portal offered by [facility] by registering at the following website: [email or web address]. By joining [employer]’s FollowMyHealth portal, you will also be able to view your health information using other applications (apps) compatible with our system.

## 2021-11-24 NOTE — ED PROVIDER NOTE - PHYSICAL EXAMINATION
PHYSICAL EXAM:    GENERAL: Alert, appears stated age, well appearing, non-toxic, on capnography.   SKIN: Warm, pink and dry. MMM.   HEAD: NC, AT, no step offs   EYE: Normal lids/conjunctiva  ENT: Normal hearing, patent oropharynx   NECK: +supple. No meningismus  Pulm: Bilateral BS, normal resp effort, no wheezes, stridor, or retractions  CV: RRR, no M/R/G, 2+and = radial pulses  Abd: soft, non-tender, non-distended  Mskel: no erythema, cyanosis, edema. no calf tenderness  Neuro: AAOx3, sleepy. moving all extremities spontaneously.

## 2021-11-24 NOTE — ED PROVIDER NOTE - NSFOLLOWUPCLINICS_GEN_ALL_ED_FT
[facility]  Detox Mgmt  [address] [place] [address]  Phone: [phone]  Fax:   Follow Up Time: 1-3 Days

## 2021-11-24 NOTE — ED PROVIDER NOTE - PROGRESS NOTE DETAILS
RAY MCGOWAN: ambulatory with steady gait and clear speech. Reviewed necessity for follow up. Counseled on red flags and to return for them.  Patient appears well on discharge.

## 2021-11-24 NOTE — ED PROVIDER NOTE - CLINICAL SUMMARY MEDICAL DECISION MAKING FREE TEXT BOX
41yM p/w opiate overdose s/p 3 doses of narcan with EMS.  Pt denies SI/HI and has no signs of traumatic injury.  Pt monitored for hours with EtCO2 until clinically sober and w/o bradypnea to require repeat dosing of narcan.  Pt declined detox/substance abuse resources but accepted narcan kit.  Ok to dc with supportive care, return precautions.

## 2021-11-24 NOTE — ED PROVIDER NOTE - NSFOLLOWUPINSTRUCTIONS_ED_ALL_ED_FT
Opioid Overdose      Opioids are drugs that are often used to treat pain. Opioids include illegal drugs, such as heroin, as well as prescription pain medicines, such as codeine, morphine, hydrocodone, oxycodone, and fentanyl. An opioid overdose happens when you take too much of an opioid. An overdose may be intentional or accidental and can happen with any type of opioid.    The effects of an overdose can be mild, dangerous, or even deadly. Opioid overdose is a medical emergency.      What are the causes?  This condition may be caused by:  •Taking too much of an opioid on purpose.      •Taking too much of an opioid by accident.      •Using two or more substances that contain opioids at the same time.      •Taking an opioid with a substance that affects your heart, breathing, or blood pressure. These include alcohol, tranquilizers, sleeping pills, illegal drugs, and some over-the-counter medicines.    This condition may also happen due to an error made by:  •A health care provider who prescribes a medicine.      •The pharmacist who fills the prescription order.        What increases the risk?  This condition is more likely in:  •Children. They may be attracted to colorful pills. Because of a child's small size, even a small amount of a drug can be dangerous.      •Older people. They may be taking many different drugs. Older people may have difficulty reading labels or remembering when they last took their medicine. They may also be more sensitive to the effects of opioids.      •People with chronic medical conditions, especially heart, liver, kidney, or neurological diseases.      •People who take an opioid for a long period of time.    •People who use:  •Illegal drugs. IV heroin is especially dangerous.      •Other substances, including alcohol, while using an opioid.      •People who have:  •A history of drug or alcohol abuse.      •Certain mental health conditions.      •A history of previous drug overdoses.        •People who take opioids that are not prescribed for them.        What are the signs or symptoms?  Symptoms of this condition depend on the type of opioid and the amount that was taken. Common symptoms include:  •Sleepiness or difficulty waking from sleep.      •Decrease in attention.      •Confusion.      •Slurred speech.      •Slowed breathing and a slow pulse (bradycardia).      •Nausea and vomiting.      •Abnormally small pupils.    Signs and symptoms that require emergency treatment include:  •Cold, clammy, and pale skin.      •Blue lips and fingernails.      •Vomiting.      •Gurgling sounds in the throat.      •A pulse that is very slow or difficult to detect.      •Breathing that is very irregular, slow, noisy, or difficult to detect.      •Limp body.      •Inability to respond to speech or be awakened from sleep (stupor).      •Seizures.        How is this diagnosed?  This condition is diagnosed based on your symptoms and medical history. It is important to tell your health care provider:  •About all of the opioids that you took.      •When you took the opioids.      •Whether you were drinking alcohol or using marijuana, cocaine, or other drugs.    Your health care provider will do a physical exam. This exam may include:  •Checking and monitoring your heart rate and rhythm, breathing rate, temperature, and blood pressure (vital signs).      •Measuring oxygen levels in your blood.      •Checking for abnormally small pupils.      You may also have blood tests or urine tests. You may have X-rays if you are having severe breathing problems.      How is this treated?  This condition requires immediate medical treatment and hospitalization. Treatment is given in the hospital intensive care (ICU) setting. Supporting your vital signs and your breathing is the first step in treating an opioid overdose. Treatment may also include:  •Giving salts and minerals (electrolytes) along with fluids through an IV.      •Inserting a breathing tube (endotracheal tube) in your airway to help you breathe if you cannot breathe on your own or you are in danger of not being able to breathe on your own.      •Giving oxygen through a small tube under your nose.      •Passing a tube through your nose and into your stomach (nasogastric tube, or NG tube) to empty your stomach.    •Giving medicines that:  •Increase your blood pressure.      •Relieve nausea and vomiting.      •Relieve abdominal pain and cramping.      •Reverse the effects of the opioid (naloxone).        •Monitoring your heart and oxygen levels.      •Ongoing counseling and mental health support if you intentionally overdosed or used an illegal drug.        Follow these instructions at home:     Medicines     •Take over-the-counter and prescription medicines only as told by your health care provider.      •Always ask your health care provider about possible side effects and interactions of any new medicine that you start taking.      •Keep a list of all the medicines that you take, including over-the-counter medicines. Bring this list with you to all your medical visits.      General instructions     •Drink enough fluid to keep your urine pale yellow.      •Keep all follow-up visits as told by your health care provider. This is important.        How is this prevented?    •Read the drug inserts that come with your opioid pain medicines.      •Take medicines only as told by your health care provider. Do not take more medicine than you are told. Do not take medicines more frequently than you are told.      • Do not drink alcohol or take sedatives when taking opioids.      • Do not use illegal or recreational drugs, including cocaine, ecstasy, and marijuana.      • Do not take opioid medicines that are not prescribed for you.      •Store all medicines in safety containers that are out of the reach of children.    •Get help if you are struggling with:  •Alcohol or drug use.      •Depression or another mental health problem.      •Thoughts of hurting yourself or another person.        •Keep the phone number of your local poison control center near your phone or in your mobile phone. In the [place] the hotline of the [facility] is (288) 154-4352.      •If you were prescribed naloxone, make sure you understand how to take it.        Contact a health care provider if you:    •Need help understanding how to take your pain medicines.      •Feel your medicines are too strong.      •Are concerned that your pain medicines are not working well for your pain.      •Develop new symptoms or side effects when you are taking medicines.        Get help right away if:    •You or someone else is having symptoms of an opioid overdose. Get help even if you are not sure.      •You have serious thoughts about hurting yourself or others.    •You have:  •Chest pain.      •Difficulty breathing.      •A loss of consciousness.        These symptoms may represent a serious problem that is an emergency. Do not wait to see if the symptoms will go away. Get medical help right away. Call your local emergency services (205 in the [place] Do not drive yourself to the hospital.   If you ever feel like you may hurt yourself or others, or have thoughts about taking your own life, get help right away. You can go to your nearest emergency department or call:   • Your local emergency services (425 in the [place]       • A suicide crisis helpline, such as the National Suicide Prevention Lifeline at 1-781.987.1281. This is open 24 hours a day.         Summary    •Opioids are drugs that are often used to treat pain. Opioids include illegal drugs, such as heroin, as well as prescription pain medicines.      •An opioid overdose happens when you take too much of an opioid.      •Overdoses can be intentional or accidental.      •Opioid overdose is very dangerous. It is a life-threatening emergency.      •If you or someone you know is experiencing an opioid overdose, get help right away.      This information is not intended to replace advice given to you by your health care provider. Make sure you discuss any questions you have with your health care provider.

## 2021-11-25 VITALS
DIASTOLIC BLOOD PRESSURE: 80 MMHG | HEART RATE: 80 BPM | TEMPERATURE: 98 F | SYSTOLIC BLOOD PRESSURE: 127 MMHG | RESPIRATION RATE: 16 BRPM | OXYGEN SATURATION: 99 %

## 2021-12-14 ENCOUNTER — INPATIENT (INPATIENT)
Facility: HOSPITAL | Age: 41
LOS: 1 days | Discharge: HOME | End: 2021-12-16
Attending: INTERNAL MEDICINE | Admitting: INTERNAL MEDICINE
Payer: MEDICAID

## 2021-12-14 VITALS — WEIGHT: 169.98 LBS | HEIGHT: 72 IN

## 2021-12-14 LAB
BASOPHILS # BLD AUTO: 0.11 K/UL — SIGNIFICANT CHANGE UP (ref 0–0.2)
BASOPHILS NFR BLD AUTO: 0.8 % — SIGNIFICANT CHANGE UP (ref 0–1)
EOSINOPHIL # BLD AUTO: 0.33 K/UL — SIGNIFICANT CHANGE UP (ref 0–0.7)
EOSINOPHIL NFR BLD AUTO: 2.5 % — SIGNIFICANT CHANGE UP (ref 0–8)
HCT VFR BLD CALC: 30 % — LOW (ref 42–52)
HGB BLD-MCNC: 8.7 G/DL — LOW (ref 14–18)
IMM GRANULOCYTES NFR BLD AUTO: 1.8 % — HIGH (ref 0.1–0.3)
LYMPHOCYTES # BLD AUTO: 1.27 K/UL — SIGNIFICANT CHANGE UP (ref 1.2–3.4)
LYMPHOCYTES # BLD AUTO: 9.5 % — LOW (ref 20.5–51.1)
MCHC RBC-ENTMCNC: 21.3 PG — LOW (ref 27–31)
MCHC RBC-ENTMCNC: 29 G/DL — LOW (ref 32–37)
MCV RBC AUTO: 73.5 FL — LOW (ref 80–94)
MONOCYTES # BLD AUTO: 1.07 K/UL — HIGH (ref 0.1–0.6)
MONOCYTES NFR BLD AUTO: 8 % — SIGNIFICANT CHANGE UP (ref 1.7–9.3)
NEUTROPHILS # BLD AUTO: 10.31 K/UL — HIGH (ref 1.4–6.5)
NEUTROPHILS NFR BLD AUTO: 77.4 % — HIGH (ref 42.2–75.2)
NRBC # BLD: 0 /100 WBCS — SIGNIFICANT CHANGE UP (ref 0–0)
PLATELET # BLD AUTO: 440 K/UL — HIGH (ref 130–400)
RBC # BLD: 4.08 M/UL — LOW (ref 4.7–6.1)
RBC # FLD: 18.1 % — HIGH (ref 11.5–14.5)
SARS-COV-2 RNA SPEC QL NAA+PROBE: SIGNIFICANT CHANGE UP
WBC # BLD: 13.33 K/UL — HIGH (ref 4.8–10.8)
WBC # FLD AUTO: 13.33 K/UL — HIGH (ref 4.8–10.8)

## 2021-12-14 PROCEDURE — 99285 EMERGENCY DEPT VISIT HI MDM: CPT

## 2021-12-14 PROCEDURE — 93010 ELECTROCARDIOGRAM REPORT: CPT

## 2021-12-14 PROCEDURE — 71045 X-RAY EXAM CHEST 1 VIEW: CPT | Mod: 26

## 2021-12-15 DIAGNOSIS — Z98.890 OTHER SPECIFIED POSTPROCEDURAL STATES: Chronic | ICD-10-CM

## 2021-12-15 DIAGNOSIS — F19.10 OTHER PSYCHOACTIVE SUBSTANCE ABUSE, UNCOMPLICATED: ICD-10-CM

## 2021-12-15 DIAGNOSIS — T40.601A POISONING BY UNSPECIFIED NARCOTICS, ACCIDENTAL (UNINTENTIONAL), INITIAL ENCOUNTER: ICD-10-CM

## 2021-12-15 LAB
ALBUMIN SERPL ELPH-MCNC: 3.8 G/DL — SIGNIFICANT CHANGE UP (ref 3.5–5.2)
ALBUMIN SERPL ELPH-MCNC: 4.1 G/DL — SIGNIFICANT CHANGE UP (ref 3.5–5.2)
ALP SERPL-CCNC: 76 U/L — SIGNIFICANT CHANGE UP (ref 30–115)
ALP SERPL-CCNC: 99 U/L — SIGNIFICANT CHANGE UP (ref 30–115)
ALT FLD-CCNC: 14 U/L — SIGNIFICANT CHANGE UP (ref 0–41)
ALT FLD-CCNC: 18 U/L — SIGNIFICANT CHANGE UP (ref 0–41)
AMPHET UR-MCNC: POSITIVE
ANION GAP SERPL CALC-SCNC: 11 MMOL/L — SIGNIFICANT CHANGE UP (ref 7–14)
ANION GAP SERPL CALC-SCNC: 13 MMOL/L — SIGNIFICANT CHANGE UP (ref 7–14)
AST SERPL-CCNC: 13 U/L — SIGNIFICANT CHANGE UP (ref 0–41)
AST SERPL-CCNC: 28 U/L — SIGNIFICANT CHANGE UP (ref 0–41)
BARBITURATES UR SCN-MCNC: NEGATIVE — SIGNIFICANT CHANGE UP
BASOPHILS # BLD AUTO: 0.08 K/UL — SIGNIFICANT CHANGE UP (ref 0–0.2)
BASOPHILS NFR BLD AUTO: 0.9 % — SIGNIFICANT CHANGE UP (ref 0–1)
BENZODIAZ UR-MCNC: POSITIVE
BILIRUB SERPL-MCNC: 0.2 MG/DL — SIGNIFICANT CHANGE UP (ref 0.2–1.2)
BILIRUB SERPL-MCNC: 0.3 MG/DL — SIGNIFICANT CHANGE UP (ref 0.2–1.2)
BUN SERPL-MCNC: 21 MG/DL — HIGH (ref 10–20)
BUN SERPL-MCNC: 28 MG/DL — HIGH (ref 10–20)
CALCIUM SERPL-MCNC: 8.7 MG/DL — SIGNIFICANT CHANGE UP (ref 8.5–10.1)
CALCIUM SERPL-MCNC: 8.8 MG/DL — SIGNIFICANT CHANGE UP (ref 8.5–10.1)
CHLORIDE SERPL-SCNC: 97 MMOL/L — LOW (ref 98–110)
CHLORIDE SERPL-SCNC: 97 MMOL/L — LOW (ref 98–110)
CK SERPL-CCNC: 105 U/L — SIGNIFICANT CHANGE UP (ref 0–225)
CO2 SERPL-SCNC: 25 MMOL/L — SIGNIFICANT CHANGE UP (ref 17–32)
CO2 SERPL-SCNC: 29 MMOL/L — SIGNIFICANT CHANGE UP (ref 17–32)
COCAINE METAB.OTHER UR-MCNC: NEGATIVE — SIGNIFICANT CHANGE UP
CREAT SERPL-MCNC: 0.9 MG/DL — SIGNIFICANT CHANGE UP (ref 0.7–1.5)
CREAT SERPL-MCNC: 1.3 MG/DL — SIGNIFICANT CHANGE UP (ref 0.7–1.5)
EOSINOPHIL # BLD AUTO: 0.37 K/UL — SIGNIFICANT CHANGE UP (ref 0–0.7)
EOSINOPHIL NFR BLD AUTO: 4.1 % — SIGNIFICANT CHANGE UP (ref 0–8)
FENTANYL UR QL: POSITIVE
GAS PNL BLDA: SIGNIFICANT CHANGE UP
GLUCOSE SERPL-MCNC: 87 MG/DL — SIGNIFICANT CHANGE UP (ref 70–99)
GLUCOSE SERPL-MCNC: 98 MG/DL — SIGNIFICANT CHANGE UP (ref 70–99)
HCT VFR BLD CALC: 28.9 % — LOW (ref 42–52)
HGB BLD-MCNC: 8.3 G/DL — LOW (ref 14–18)
IMM GRANULOCYTES NFR BLD AUTO: 2.2 % — HIGH (ref 0.1–0.3)
LYMPHOCYTES # BLD AUTO: 1.49 K/UL — SIGNIFICANT CHANGE UP (ref 1.2–3.4)
LYMPHOCYTES # BLD AUTO: 16.5 % — LOW (ref 20.5–51.1)
MAGNESIUM SERPL-MCNC: 2.1 MG/DL — SIGNIFICANT CHANGE UP (ref 1.8–2.4)
MAGNESIUM SERPL-MCNC: 2.1 MG/DL — SIGNIFICANT CHANGE UP (ref 1.8–2.4)
MCHC RBC-ENTMCNC: 21.2 PG — LOW (ref 27–31)
MCHC RBC-ENTMCNC: 28.7 G/DL — LOW (ref 32–37)
MCV RBC AUTO: 73.9 FL — LOW (ref 80–94)
METHADONE UR-MCNC: POSITIVE
MONOCYTES # BLD AUTO: 0.81 K/UL — HIGH (ref 0.1–0.6)
MONOCYTES NFR BLD AUTO: 9 % — SIGNIFICANT CHANGE UP (ref 1.7–9.3)
NEUTROPHILS # BLD AUTO: 6.06 K/UL — SIGNIFICANT CHANGE UP (ref 1.4–6.5)
NEUTROPHILS NFR BLD AUTO: 67.3 % — SIGNIFICANT CHANGE UP (ref 42.2–75.2)
NRBC # BLD: 0 /100 WBCS — SIGNIFICANT CHANGE UP (ref 0–0)
NT-PROBNP SERPL-SCNC: 165 PG/ML — SIGNIFICANT CHANGE UP (ref 0–300)
OPIATES UR-MCNC: POSITIVE
PCP SPEC-MCNC: SIGNIFICANT CHANGE UP
PLATELET # BLD AUTO: 400 K/UL — SIGNIFICANT CHANGE UP (ref 130–400)
POTASSIUM SERPL-MCNC: 4.8 MMOL/L — SIGNIFICANT CHANGE UP (ref 3.5–5)
POTASSIUM SERPL-MCNC: 4.9 MMOL/L — SIGNIFICANT CHANGE UP (ref 3.5–5)
POTASSIUM SERPL-SCNC: 4.8 MMOL/L — SIGNIFICANT CHANGE UP (ref 3.5–5)
POTASSIUM SERPL-SCNC: 4.9 MMOL/L — SIGNIFICANT CHANGE UP (ref 3.5–5)
PROPOXYPHENE QUALITATIVE URINE RESULT: NEGATIVE — SIGNIFICANT CHANGE UP
PROT SERPL-MCNC: 6 G/DL — SIGNIFICANT CHANGE UP (ref 6–8)
PROT SERPL-MCNC: 6.7 G/DL — SIGNIFICANT CHANGE UP (ref 6–8)
RBC # BLD: 3.91 M/UL — LOW (ref 4.7–6.1)
RBC # FLD: 18.3 % — HIGH (ref 11.5–14.5)
SODIUM SERPL-SCNC: 135 MMOL/L — SIGNIFICANT CHANGE UP (ref 135–146)
SODIUM SERPL-SCNC: 137 MMOL/L — SIGNIFICANT CHANGE UP (ref 135–146)
TROPONIN T SERPL-MCNC: <0.01 NG/ML — SIGNIFICANT CHANGE UP
WBC # BLD: 9.01 K/UL — SIGNIFICANT CHANGE UP (ref 4.8–10.8)
WBC # FLD AUTO: 9.01 K/UL — SIGNIFICANT CHANGE UP (ref 4.8–10.8)

## 2021-12-15 PROCEDURE — 99221 1ST HOSP IP/OBS SF/LOW 40: CPT

## 2021-12-15 PROCEDURE — 99222 1ST HOSP IP/OBS MODERATE 55: CPT

## 2021-12-15 PROCEDURE — 99233 SBSQ HOSP IP/OBS HIGH 50: CPT

## 2021-12-15 RX ORDER — PANTOPRAZOLE SODIUM 20 MG/1
40 TABLET, DELAYED RELEASE ORAL DAILY
Refills: 0 | Status: DISCONTINUED | OUTPATIENT
Start: 2021-12-15 | End: 2021-12-16

## 2021-12-15 RX ORDER — HEPARIN SODIUM 5000 [USP'U]/ML
5000 INJECTION INTRAVENOUS; SUBCUTANEOUS EVERY 8 HOURS
Refills: 0 | Status: DISCONTINUED | OUTPATIENT
Start: 2021-12-15 | End: 2021-12-16

## 2021-12-15 RX ORDER — NALOXONE HYDROCHLORIDE 4 MG/.1ML
0.2 SPRAY NASAL ONCE
Refills: 0 | Status: COMPLETED | OUTPATIENT
Start: 2021-12-15 | End: 2021-12-15

## 2021-12-15 RX ORDER — CHLORHEXIDINE GLUCONATE 213 G/1000ML
1 SOLUTION TOPICAL DAILY
Refills: 0 | Status: DISCONTINUED | OUTPATIENT
Start: 2021-12-15 | End: 2021-12-16

## 2021-12-15 RX ORDER — NALOXONE HYDROCHLORIDE 4 MG/.1ML
0.18 SPRAY NASAL
Qty: 2 | Refills: 0 | Status: DISCONTINUED | OUTPATIENT
Start: 2021-12-15 | End: 2021-12-16

## 2021-12-15 RX ORDER — SODIUM CHLORIDE 9 MG/ML
1000 INJECTION INTRAMUSCULAR; INTRAVENOUS; SUBCUTANEOUS
Refills: 0 | Status: DISCONTINUED | OUTPATIENT
Start: 2021-12-15 | End: 2021-12-16

## 2021-12-15 RX ADMIN — NALOXONE HYDROCHLORIDE 0.2 MILLIGRAM(S): 4 SPRAY NASAL at 04:10

## 2021-12-15 RX ADMIN — HEPARIN SODIUM 5000 UNIT(S): 5000 INJECTION INTRAVENOUS; SUBCUTANEOUS at 15:20

## 2021-12-15 RX ADMIN — HEPARIN SODIUM 5000 UNIT(S): 5000 INJECTION INTRAVENOUS; SUBCUTANEOUS at 06:48

## 2021-12-15 RX ADMIN — NALOXONE HYDROCHLORIDE 45 MG/HR: 4 SPRAY NASAL at 21:18

## 2021-12-15 RX ADMIN — PANTOPRAZOLE SODIUM 40 MILLIGRAM(S): 20 TABLET, DELAYED RELEASE ORAL at 12:33

## 2021-12-15 RX ADMIN — SODIUM CHLORIDE 100 MILLILITER(S): 9 INJECTION INTRAMUSCULAR; INTRAVENOUS; SUBCUTANEOUS at 21:18

## 2021-12-15 RX ADMIN — HEPARIN SODIUM 5000 UNIT(S): 5000 INJECTION INTRAVENOUS; SUBCUTANEOUS at 21:18

## 2021-12-15 RX ADMIN — SODIUM CHLORIDE 100 MILLILITER(S): 9 INJECTION INTRAMUSCULAR; INTRAVENOUS; SUBCUTANEOUS at 06:49

## 2021-12-15 RX ADMIN — NALOXONE HYDROCHLORIDE 45 MG/HR: 4 SPRAY NASAL at 04:49

## 2021-12-15 RX ADMIN — CHLORHEXIDINE GLUCONATE 1 APPLICATION(S): 213 SOLUTION TOPICAL at 12:33

## 2021-12-15 RX ADMIN — NALOXONE HYDROCHLORIDE 45 MG/HR: 4 SPRAY NASAL at 15:20

## 2021-12-15 NOTE — PATIENT PROFILE ADULT - FALL HARM RISK - RISK INTERVENTIONS
Pt on bedrest/Reinforce activity limits and safety measures with patient and family/Reorient to person, place and time as needed/Use of alarms - bed, chair and/or voice tab/Bed in lowest position, wheels locked, appropriate side rails in place/Call bell, personal items and telephone in reach/Instruct patient to call for assistance before getting out of bed or chair/[place] to call system/Physically safe environment - no spills, clutter or unnecessary equipment/Purposeful Proactive Rounding/Room/bathroom lighting operational, light cord in reach

## 2021-12-15 NOTE — CONSULT NOTE ADULT - ASSESSMENT
IMPRESSION:    Altered Mental Status  Opioid overdose  HO CAP & Aspiration PNA  HO Complicated Parapneumonic Effusion/Empyema SP VATS/open decortication      PLAN:    CNS:  No depressants.  Wean Narcan drip.  SDS & UDS.   CT Head.  Trending ETCO2.  One to one when more alert.    HEENT: Oral care    PULMONARY:  HOB @ 45 degrees.  Aspiration precautions.  Target RR 12 - 16.  Wean supplemental O2.  NIV if hypercarbic.    CARDIOVASCULAR:  c/w IVF.  Keep I = O.  CE x 1.    GI: GI prophylaxis.  NPO for now until fully alert and evaluated by S&S.  Bowel regimen     RENAL:  Follow up lytes.  Correct as needed.  Bladder US q12h.  Monitor UO.    INFECTIOUS DISEASE:  Repeat CBC in PM.  Monitor off Abx.  Procalcitonin.  Follow up cultures    HEMATOLOGICAL:  DVT prophylaxis.    ENDOCRINE:  Follow up FS.  Insulin protocol if needed.  TSH.    MUSCULOSKELETAL:  bed rest, fall precautions    Monitor in MICU

## 2021-12-15 NOTE — ED ADULT NURSE NOTE - NSIMPLEMENTINTERV_GEN_ALL_ED
Implemented All Fall Risk Interventions:  [address] to call system. Call bell, personal items and telephone within reach. Instruct patient to call for assistance. Room bathroom lighting operational. Non-slip footwear when patient is off stretcher. Physically safe environment: no spills, clutter or unnecessary equipment. Stretcher in lowest position, wheels locked, appropriate side rails in place. Provide visual cue, wrist band, yellow gown, etc. Monitor gait and stability. Monitor for mental status changes and reorient to person, place, and time. Review medications for side effects contributing to fall risk. Reinforce activity limits and safety measures with patient and family.

## 2021-12-15 NOTE — H&P ADULT - HISTORY OF PRESENT ILLNESS
42 yo W male w/ PMH: as noted below.  Pt found obtunded . pt  BIBA .  IN ER pt stated " i took 2 hits fentanyl".  Pt was given total 3 doses 3 doses IV narcan.  ICU was consulted for diminished respirations (6/min) and  monitoring of pt's respiratory status.  pt now being place on narcan IVPB in ER                             40 yo W male w/ PMH: as noted below.  Pt found obtunded . pt  BIBA .  IN ER pt stated " i took 2 hits fentanyl".  Pt was given total 3 doses  IV narcan.  ICU was consulted for diminished respirations (6/min) and  monitoring of pt's respiratory status.  pt now being place on narcan IVPB in ER

## 2021-12-15 NOTE — H&P ADULT - NSHPREVIEWOFSYSTEMS_GEN_ALL_CORE
presently pt is in stuporous mental status w/ periods of lucidity. ROS @ this time will be inaccurate

## 2021-12-15 NOTE — CHART NOTE - NSCHARTNOTEFT_GEN_A_CORE
Pt. seen on am rounds, still on narcan drip, sleepy. Pt. satting well and able to protect airway. Drug screen pending.

## 2021-12-15 NOTE — H&P ADULT - ASSESSMENT
41 yr old male with hx of opoid use, peptic ulcer disease (last 15 years, found to have bleeding ulcer with Dr. Shah [facility] on endoscopy 2 month ago, recent admission for anemia with endoscopy showing hill grade 4 ) admitted for opioid overdose       # Hx opoid abuse  - last used 2 days ago  - continue with methadone 30mg daily  - CATCH team consult     # CAMERON  - Cr 1.3 (baseline 0.9)  - start NS@100    # H/o Peptic Ulcer Disease  - c/w protonix   # DVT Ppx  - start lovenox     # Regular Diet    # Ambulate as tolerted  - fall precaution     # Full code

## 2021-12-15 NOTE — H&P ADULT - HISTORY OF PRESENT ILLNESS
41 yr old male with hx of opoid use, peptic ulcer disease (last 15 years, found to have bleeding ulcer with Dr. Alyssa [facility] GI on endoscopy 2 month ago, recent admission for anemia with endoscopy showing hill grade 4 ) presents with

## 2021-12-15 NOTE — PROGRESS NOTE ADULT - ASSESSMENT
40 yo male, pmh of substance abuse, pleural effusion s/p VATS presents to ed for overdose, pt snorted 2 bags of heroin,  was found in apartment staircase, lethargic but alert per ems. In ED pt placed on Narcan drip and admitted to ICU    Opiate and benzo overdose     - pt this am alert and oriented on Narcan drip   - adds that he took Xanax in addition to heroin   - wean and DC Narcan drip   - electronic chart review reveals multiple hospital visits for OD   - detox consult - pt may benefit from methadone program

## 2021-12-15 NOTE — H&P ADULT - ASSESSMENT
1. opiate ( fentanyl) overdose (minimally responsive to IV narcan) requiring  narcan IVPB  2. Hx-polysubstance abuse, PUD/GIB,  pneumonia (w/empyema requiring chest tubes)      Plan;  Discussed w/ ICU [place] HATCH  adm to ICU  monitor respiratory status & cardiac status  cont IV narcan   detox consult  GI/DVT prophylaxis

## 2021-12-15 NOTE — H&P ADULT - NSHPLABSRESULTS_GEN_ALL_CORE
8.7    13.33 )-----------( 440      ( 14 Dec 2021 23:40 )             30.0       12-14    135  |  97<L>  |  28<H>  ----------------------------<  98  4.9   |  25  |  1.3    Ca    8.7      14 Dec 2021 23:40  Mg     2.1     12-14    TPro  6.7  /  Alb  4.1  /  TBili  0.2  /  DBili  x   /  AST  28  /  ALT  18  /  AlkPhos  99  12-14          ABG - ( 15 Dec 2021 00:23 )  pH, Arterial: 7.41  pH, Blood: x     /  pCO2: 45    /  pO2: 115   / HCO3: 28    / Base Excess: 3.4   /  SaO2: 98.5      Lactate Trend      CARDIAC MARKERS ( 14 Dec 2021 23:40 )  x     / <0.01 ng/mL / x     / x     / x            CAPILLARY BLOOD GLUCOSE
8.7    13.33 )-----------( 440      ( 14 Dec 2021 23:40 )             30.0       12-14    135  |  97<L>  |  28<H>  ----------------------------<  98  4.9   |  25  |  1.3    Ca    8.7      14 Dec 2021 23:40  Mg     2.1     12-14    TPro  6.7  /  Alb  4.1  /  TBili  0.2  /  DBili  x   /  AST  28  /  ALT  18  /  AlkPhos  99  12-14      Xray Chest (12.14.21): No acute infiltrates.c

## 2021-12-15 NOTE — CONSULT NOTE ADULT - SUBJECTIVE AND OBJECTIVE BOX
Patient is a 41y old  Male who presents with a chief complaint of opiate overdose (15 Dec 2021 08:21)    HPI:  41 year old male with a past medical history of Polysubstance abuse, Empyema SP Chest tube, GIB secondary to PUD, presenting for obtundation.  Patient was brought in by ambulance with obtundation and he reported that he "took 2 hits of fetany."  Patient was given 3 doses of IV Narcan and then was placed on Narcan drip due to diminished respiration (RR 6/min).  Denies fevers, chills, nausea, vomiting, diarrhea, chest pain, palpitations, blurry vision, focal neurological deficits, abdominal pain, hematemesis, melena, dysuria, lower extremity tenderness/swelling/erythema.    Currently mildly lethargic but arousability is obtained with verbal and tactile stimuli, protecting his airways.  On Narcan drip 0.18 mg/hr    PAST MEDICAL & SURGICAL HISTORY:  Peptic ulcer disease  GIB  Polysubstance abuse  H/O chest tube placement  Empyema    SOCIAL HX:    Smoker            Polysubstance abuser (opiates, benzos, alcohol)    FAMILY HISTORY:  No known cardiovascular family history     Review Of Systems:   All ROS are negative except per HPI     Allergies  No Known Allergies    Intolerances    PHYSICAL EXAM    ICU Vital Signs Last 24 Hrs  T(C): 36.9 (15 Dec 2021 07:01), Max: 36.9 (15 Dec 2021 07:01)  T(F): 98.5 (15 Dec 2021 07:01), Max: 98.5 (15 Dec 2021 07:01)  HR: 85 (15 Dec 2021 05:59) (85 - 110)  BP: 138/77 (15 Dec 2021 05:59) (123/76 - 139/94)  BP(mean): 98 (15 Dec 2021 05:59) (98 - 98)  RR: 19 (15 Dec 2021 07:01) (7 - 19)  SpO2: 99% (15 Dec 2021 05:59) (95% - 99%)    CONSTITUTIONAL:  Ill appearing.  Minimally diaphoretic  Well nourished.  NAD    ENT:   Airway patent,   Mouth with normal mucosa.   No thrush    EYES:   pupils dilated & equal,   round and reactive to light    CARDIAC:   Normal rate,  Regular rhythm.    Heart sounds S1, S2  No edema    RESPIRATORY:   No wheezing   Normal chest expansion  No use of accessory muscles    GASTROINTESTINAL:  Abdomen soft   Non-tender,   No guarding,   + BS    MUSCULOSKELETAL:   Range of motion is not limited,  No clubbing, cyanosis    NEUROLOGICAL:   Alert and oriented  Mild lethargy  No motor or sensory deficits.    SKIN:   Skin normal color for race,   Warm and dry  No evidence of rash.      LABS:                        8.7    13.33 )-----------( 440      ( 14 Dec 2021 23:40 )             30.0     135  |  97<L>  |  28<H>  ----------------------------<  98  4.9   |  25  |  1.3    Ca    8.7      14 Dec 2021 23:40  Mg     2.1     12-14    TPro  6.7  /  Alb  4.1  /  TBili  0.2  /  DBili  x   /  AST  28  /  ALT  18  /  AlkPhos  99  12-14                      CARDIAC MARKERS ( 14 Dec 2021 23:40 )  x     / <0.01 ng/mL / x     / x     / x        LIVER FUNCTIONS - ( 14 Dec 2021 23:40 )  Alb: 4.1 g/dL / Pro: 6.7 g/dL / ALK PHOS: 99 U/L / ALT: 18 U/L / AST: 28 U/L / GGT: x                                              ABG - ( 15 Dec 2021 00:23 )  pH, Arterial: 7.41  pH, Blood: x     /  pCO2: 45    /  pO2: 115   / HCO3: 28    / Base Excess: 3.4   /  SaO2: 98.5      X-Rays reviewed:                                                                                    ECHO    CXR interpreted by me:  low lung volumes    MEDICATIONS  (STANDING):  chlorhexidine 4% Liquid 1 Application(s) Topical daily  heparin   Injectable 5000 Unit(s) SubCutaneous every 8 hours  naloxone Infusion 0.18 mG/Hr (45 mL/Hr) IV Continuous <Continuous>  pantoprazole  Injectable 40 milliGRAM(s) IV Push daily  sodium chloride 0.9%. 1000 milliLiter(s) (100 mL/Hr) IV Continuous <Continuous>    MEDICATIONS  (PRN): Patient is a 41y old  Male who presents with a chief complaint of opiate overdose (15 Dec 2021 08:21)    HPI:  41 year old male with a past medical history of Polysubstance abuse, Empyema SP Chest tube, GIB secondary to PUD, presenting for obtundation.  Patient was brought in by ambulance with obtundation and he reported that he "took 2 hits of fetany."  Patient was given 3 doses of IV Narcan and then was placed on Narcan drip due to diminished respiration (RR 6/min).  Denies fevers, chills, nausea, vomiting, diarrhea, chest pain, palpitations, blurry vision, focal neurological deficits, abdominal pain, hematemesis, melena, dysuria, lower extremity tenderness/swelling/erythema.    Currently mildly lethargic but arousability is obtained with verbal and tactile stimuli, protecting his airways.  On Narcan drip 0.18 mg/hr    PAST MEDICAL & SURGICAL HISTORY:  Peptic ulcer disease  GIB  Polysubstance abuse  H/O chest tube placement  Empyema    SOCIAL HX:    Smoker            Polysubstance abuser (opiates, benzos, alcohol)    FAMILY HISTORY:  No known cardiovascular family history     Review Of Systems:   All ROS are negative except per HPI     Allergies  No Known Allergies    Intolerances    PHYSICAL EXAM    ICU Vital Signs Last 24 Hrs  T(C): 36.9 (15 Dec 2021 07:01), Max: 36.9 (15 Dec 2021 07:01)  T(F): 98.5 (15 Dec 2021 07:01), Max: 98.5 (15 Dec 2021 07:01)  HR: 85 (15 Dec 2021 05:59) (85 - 110)  BP: 138/77 (15 Dec 2021 05:59) (123/76 - 139/94)  BP(mean): 98 (15 Dec 2021 05:59) (98 - 98)  RR: 19 (15 Dec 2021 07:01) (7 - 19)  SpO2: 99% (15 Dec 2021 05:59) (95% - 99%)    CONSTITUTIONAL:  Ill appearing.  Minimally diaphoretic  Well nourished.  NAD    ENT:   Airway patent,   Mouth with normal mucosa.   No thrush    EYES:   pupils dilated & equal,   round and reactive to light    CARDIAC:   Normal rate,  Regular rhythm.    Heart sounds S1, S2  No edema    RESPIRATORY:   No wheezing   Normal chest expansion  No use of accessory muscles    GASTROINTESTINAL:  Abdomen soft   Non-tender,   No guarding,   + BS    MUSCULOSKELETAL:   Range of motion is not limited,  No clubbing, cyanosis    NEUROLOGICAL:   Alert and oriented  follow command   No motor or sensory deficits.    SKIN:   Skin normal color for race,   Warm and dry  No evidence of rash.      LABS:                        8.7    13.33 )-----------( 440      ( 14 Dec 2021 23:40 )             30.0     135  |  97<L>  |  28<H>  ----------------------------<  98  4.9   |  25  |  1.3    Ca    8.7      14 Dec 2021 23:40  Mg     2.1     12-14    TPro  6.7  /  Alb  4.1  /  TBili  0.2  /  DBili  x   /  AST  28  /  ALT  18  /  AlkPhos  99  12-14                      CARDIAC MARKERS ( 14 Dec 2021 23:40 )  x     / <0.01 ng/mL / x     / x     / x        LIVER FUNCTIONS - ( 14 Dec 2021 23:40 )  Alb: 4.1 g/dL / Pro: 6.7 g/dL / ALK PHOS: 99 U/L / ALT: 18 U/L / AST: 28 U/L / GGT: x                                              ABG - ( 15 Dec 2021 00:23 )  pH, Arterial: 7.41  pH, Blood: x     /  pCO2: 45    /  pO2: 115   / HCO3: 28    / Base Excess: 3.4   /  SaO2: 98.5      X-Rays reviewed:                                                                                    ECHO    CXR interpreted by me:  low lung volumes    MEDICATIONS  (STANDING):  chlorhexidine 4% Liquid 1 Application(s) Topical daily  heparin   Injectable 5000 Unit(s) SubCutaneous every 8 hours  naloxone Infusion 0.18 mG/Hr (45 mL/Hr) IV Continuous <Continuous>  pantoprazole  Injectable 40 milliGRAM(s) IV Push daily  sodium chloride 0.9%. 1000 milliLiter(s) (100 mL/Hr) IV Continuous <Continuous>    MEDICATIONS  (PRN):

## 2021-12-15 NOTE — H&P ADULT - NSHPSOCIALHISTORY_GEN_ALL_CORE
Marital Status:  (   )    (   ) Single    (   )    (  )   Lives with: (  ) alone  (  ) children   (  ) spouse   (  ) parents  (  ) other  Recent Travel: No recent travel  Occupation:    Substance Use (street drugs): (  ) never used  ( x ) other: oxycodone   Tobacco Usage:  (   ) never smoked   (   ) former smoker   ( x  ) current smoker  (     ) pack year  Alcohol Usage: None
polysubstance abuser (opiates, benzos, alcohol)

## 2021-12-15 NOTE — H&P ADULT - NSICDXPASTMEDICALHX_GEN_ALL_CORE_FT
PAST MEDICAL HISTORY:  Peptic ulcer disease     
PAST MEDICAL HISTORY:  Peptic ulcer disease GIB    Polysubstance abuse

## 2021-12-15 NOTE — CONSULT NOTE ADULT - PROBLEM SELECTOR RECOMMENDATION 9
After evaluation at this time there is no need for protocol for withdrawal. His stated history does not need tapers for opiates or benzos. Pt can be continued to be weaned off narcan drip. Can monitor for withdrawal and use PRN if necessary. Pt does not want to be on suboxone and methadone but may benefit from ooutpatient naltrexone po and transition to IM injections.  Pt will be monitored and supportive care provided  CATCH team involved for aftercare and if pt decides he wants assistance with aftercare will provide outpatient appointment.

## 2021-12-15 NOTE — H&P ADULT - ATTENDING COMMENTS
adm to ICU  monitor respiratory status & cardiac status  cont IV narcan   detox consult  GI/DVT prophylaxis

## 2021-12-15 NOTE — H&P ADULT - NSICDXPASTSURGICALHX_GEN_ALL_CORE_FT
PAST SURGICAL HISTORY:  No significant past surgical history     
PAST SURGICAL HISTORY:  H/O chest tube placement empyema

## 2021-12-15 NOTE — CONSULT NOTE ADULT - SUBJECTIVE AND OBJECTIVE BOX
Pt interviewed, examined and EMR chart reviewed.  Pt admits to using opiates heroine with possible fentanyl intermittently but up to 2-3 bags at a time to get high. Pt states took 2 xanax sticks (2mg) yesterday as well and that is why he believes he was sedated and confused yesterday. Pt states he just was using recreationally to get high. Pt has been in treatment multiple times including methadone and suboxone treatment. Pt does not want tto go on these types of treatments at this time. Pt has a new city job and does not want treatment. Pt recently was in rehabs as well as [facility] incarceration for 3 years until last september. Pt did well for a period of time and was using sporadicially but is more regular use now. Pt denies any withdrawal when he does not use now. Pt states his last benzo use before yesterday was a month ago. Pt was completely alert after being awoken from sleep and was able to answer all questions.  Hx of withdrawal   variable periods of sobriety in the past.  Has been in detox before _X___yes,   _____No    SOCIAL HISTORY:    REVIEW OF SYSTEMS:    Constitutional: No fever, weight loss or fatigue  ENT:  No difficulty hearing, tinnitus, vertigo; No sinus or throat pain  Neck: No pain or stiffness  Respiratory: No cough, wheezing, chills or hemoptysis  Cardiovascular: No chest pain, palpitations, shortness of breath, dizziness or leg swelling  Gastrointestinal: No abdominal or epigastric pain. No nausea, vomiting or hematemesis; No diarrhea or constipation. No melena or hematochezia.  Neurological: No headaches, memory loss, loss of strength, numbness or tremors  Musculoskeletal: No joint pain or swelling; No muscle, back or extremity pain  Psychiatric: No depression, anxiety, mood swings or difficulty sleeping    MEDICATIONS  (STANDING):  chlorhexidine 4% Liquid 1 Application(s) Topical daily  heparin   Injectable 5000 Unit(s) SubCutaneous every 8 hours  naloxone Infusion 0.18 mG/Hr (45 mL/Hr) IV Continuous <Continuous>  pantoprazole  Injectable 40 milliGRAM(s) IV Push daily  sodium chloride 0.9%. 1000 milliLiter(s) (100 mL/Hr) IV Continuous <Continuous>    MEDICATIONS  (PRN):      Vital Signs Last 24 Hrs  T(C): 37.2 (15 Dec 2021 11:00), Max: 37.2 (15 Dec 2021 11:00)  T(F): 99 (15 Dec 2021 11:00), Max: 99 (15 Dec 2021 11:00)  HR: 84 (15 Dec 2021 07:15) (84 - 110)  BP: 126/70 (15 Dec 2021 07:15) (123/76 - 139/94)  BP(mean): 92 (15 Dec 2021 07:15) (92 - 98)  RR: 11 (15 Dec 2021 11:00) (7 - 19)  SpO2: 98% (15 Dec 2021 07:15) (95% - 99%)    PHYSICAL EXAM:    Constitutional: NAD, well-groomed, well-developed  HEENT: PERRLA, EOMI, Normal Hearing, MMM  Neck: No LAD, No JVD  Back: Normal spine flexure, No CVA tenderness  Respiratory: CTAB/L  Cardiovascular: S1 and S2, RRR, no M/G/R  Gastrointestinal: BS+, soft, NT/ND  Extremities: No peripheral edema  Vascular: 2+ peripheral pulses  Neurological: A/O x 3, no focal deficits    LABS:                        8.7    13.33 )-----------( 440      ( 14 Dec 2021 23:40 )             30.0     12-14    135  |  97<L>  |  28<H>  ----------------------------<  98  4.9   |  25  |  1.3    Ca    8.7      14 Dec 2021 23:40  Mg     2.1     12-14    TPro  6.7  /  Alb  4.1  /  TBili  0.2  /  DBili  x   /  AST  28  /  ALT  18  /  AlkPhos  99  12-14        Drug Screen Urine:  Alcohol Level        RADIOLOGY & ADDITIONAL STUDIES:

## 2021-12-15 NOTE — CONSULT NOTE ADULT - ATTENDING COMMENTS
patient seen and examined agree above note   taper of Narcan drip   follow detox   monitor end tidal co2   check CK

## 2021-12-15 NOTE — ED PROVIDER NOTE - CLINICAL SUMMARY MEDICAL DECISION MAKING FREE TEXT BOX
41yM substance abuse  empyema pw opioid overdose  got naloxone in field by ems, in ed pt denies congestions no si  admits to 3 bags of heroin. in  ed  pt required 3 additional doses of naloxone  about 2 hours apart -  started on  naloxone drip   admitted to icu

## 2021-12-15 NOTE — ED PROVIDER NOTE - OBJECTIVE STATEMENT
40 yo male, pmh of substance abuse, presents to ed for overdose, pt received 2mg IM of narcan, pt snorted 2 bags of heroin, was found in apartment staircase, lethargic but alert per ems. at this time pt without pain or complaints. denies fever, chills, cp, sob, abd pain, nvd, si/hi.

## 2021-12-15 NOTE — H&P ADULT - NSHPPHYSICALEXAM_GEN_ALL_CORE
GENERAL:  40y/o  W Male .pt noted to have diminished respiratory rate. pt is stuporous   HEAD:  Atraumatic, Normocephalic  EYES: EOMI, PERRLA, conjunctiva and sclera clear  NECK: Supple, No JVD, no cervical lymphadenopathy, non-tender  CHEST/LUNG: Clear to auscultation bilaterally; No wheeze, rhonchi, or rales  HEART: Regular rate and rhythm; S1&S2  ABDOMEN: Soft, Nontender, Nondistended x 4 quadrants; Bowel sounds present  EXTREMITIES:   Peripheral Pulses Present, No clubbing, no cyanosis, or no edema, no calf tenderness  PSYCH: stuperous w/ peridos   NEUROLOGY: unable to assess  SKIN: WNL
GENERAL: NAD, well-developed, Non-toxic, stated age   HEAD:  Atraumatic, Normocephalic  EYES: EOMI, Sclera White   NECK: Supple, No JVD  CHEST/LUNG: clear b/l breath sounds; No wheezing, rhonchi, or crackles  HEART: Regular rate and rhythm; s1, s2, No murmurs, rubs, or gallops  ABDOMEN: Soft, Nontender, Nondistended; Bowel sounds present, No rebound or guarding noted   EXTREMITIES:  No lower extremity edema or calf tenderness to palpation.  No clubbing or cyanosis  PSYCH: AAOx3, pleasant, cooperative, not anxious  NEUROLOGY: CN intact, 5/5 strength in all extremities, Sensation grossly intact.   SKIN: No rashes or lesions

## 2021-12-15 NOTE — ED ADULT NURSE NOTE - ISOLATION TYPE:
This lab was reviewed with patient in office; see my office visit note for details.   Carolina Pulliam MD     None

## 2021-12-16 ENCOUNTER — TRANSCRIPTION ENCOUNTER (OUTPATIENT)
Age: 41
End: 2021-12-16

## 2021-12-16 VITALS — RESPIRATION RATE: 20 BRPM | TEMPERATURE: 98 F

## 2021-12-16 LAB
6-ACETYLMORPHINE, UR RESULT: NEGATIVE NG/ML — SIGNIFICANT CHANGE UP
6MAM UR CFM-MCNC: NEGATIVE NG/ML — SIGNIFICANT CHANGE UP
ALBUMIN SERPL ELPH-MCNC: 3.2 G/DL — LOW (ref 3.5–5.2)
ALP SERPL-CCNC: 71 U/L — SIGNIFICANT CHANGE UP (ref 30–115)
ALT FLD-CCNC: 12 U/L — SIGNIFICANT CHANGE UP (ref 0–41)
ANION GAP SERPL CALC-SCNC: 10 MMOL/L — SIGNIFICANT CHANGE UP (ref 7–14)
AST SERPL-CCNC: 10 U/L — SIGNIFICANT CHANGE UP (ref 0–41)
BASOPHILS # BLD AUTO: 0.08 K/UL — SIGNIFICANT CHANGE UP (ref 0–0.2)
BASOPHILS NFR BLD AUTO: 1 % — SIGNIFICANT CHANGE UP (ref 0–1)
BILIRUB SERPL-MCNC: <0.2 MG/DL — SIGNIFICANT CHANGE UP (ref 0.2–1.2)
BUN SERPL-MCNC: 13 MG/DL — SIGNIFICANT CHANGE UP (ref 10–20)
CALCIUM SERPL-MCNC: 8.4 MG/DL — LOW (ref 8.5–10.1)
CHLORIDE SERPL-SCNC: 104 MMOL/L — SIGNIFICANT CHANGE UP (ref 98–110)
CO2 SERPL-SCNC: 27 MMOL/L — SIGNIFICANT CHANGE UP (ref 17–32)
CODEINE UR CFM-MCNC: NEGATIVE NG/ML — SIGNIFICANT CHANGE UP
CODEINE, UR RESULT: NEGATIVE NG/ML — SIGNIFICANT CHANGE UP
CREAT SERPL-MCNC: 0.8 MG/DL — SIGNIFICANT CHANGE UP (ref 0.7–1.5)
EOSINOPHIL # BLD AUTO: 0.38 K/UL — SIGNIFICANT CHANGE UP (ref 0–0.7)
EOSINOPHIL NFR BLD AUTO: 4.7 % — SIGNIFICANT CHANGE UP (ref 0–8)
GLUCOSE SERPL-MCNC: 83 MG/DL — SIGNIFICANT CHANGE UP (ref 70–99)
HCT VFR BLD CALC: 27.7 % — LOW (ref 42–52)
HGB BLD-MCNC: 8 G/DL — LOW (ref 14–18)
HYDROCODONE UR QL CFM: NEGATIVE NG/ML — SIGNIFICANT CHANGE UP
HYDROCODONE, UR RESULT: NEGATIVE NG/ML — SIGNIFICANT CHANGE UP
HYDROMORPHONE UR QL CFM: 246 NG/ML — SIGNIFICANT CHANGE UP
HYDROMORPHONE, UR RESULT: 246 NG/ML — SIGNIFICANT CHANGE UP
IMM GRANULOCYTES NFR BLD AUTO: 2.4 % — HIGH (ref 0.1–0.3)
IRON SATN MFR SERPL: 16 UG/DL — LOW (ref 35–150)
IRON SATN MFR SERPL: 6 % — LOW (ref 15–50)
LYMPHOCYTES # BLD AUTO: 1.91 K/UL — SIGNIFICANT CHANGE UP (ref 1.2–3.4)
LYMPHOCYTES # BLD AUTO: 23.6 % — SIGNIFICANT CHANGE UP (ref 20.5–51.1)
MAGNESIUM SERPL-MCNC: 1.8 MG/DL — SIGNIFICANT CHANGE UP (ref 1.8–2.4)
MCHC RBC-ENTMCNC: 21.2 PG — LOW (ref 27–31)
MCHC RBC-ENTMCNC: 28.9 G/DL — LOW (ref 32–37)
MCV RBC AUTO: 73.5 FL — LOW (ref 80–94)
MONOCYTES # BLD AUTO: 0.89 K/UL — HIGH (ref 0.1–0.6)
MONOCYTES NFR BLD AUTO: 11 % — HIGH (ref 1.7–9.3)
MORPHINE UR QL CFM: SIGNIFICANT CHANGE UP NG/ML
MORPHINE, UR RESULT: SIGNIFICANT CHANGE UP NG/ML
NEUTROPHILS # BLD AUTO: 4.63 K/UL — SIGNIFICANT CHANGE UP (ref 1.4–6.5)
NEUTROPHILS NFR BLD AUTO: 57.3 % — SIGNIFICANT CHANGE UP (ref 42.2–75.2)
NOROXYCODONE (OPIATES), UR RESULT: NEGATIVE NG/ML — SIGNIFICANT CHANGE UP
NOROXYCODONE UR CFM-MCNC: NEGATIVE NG/ML — SIGNIFICANT CHANGE UP
NRBC # BLD: 0 /100 WBCS — SIGNIFICANT CHANGE UP (ref 0–0)
OPIATES IN-HOUSE INTERPRETATION: POSITIVE
OPIATES UR QL CFM: POSITIVE
OXYCODONE (OPIATES), UR RESULT: NEGATIVE NG/ML — SIGNIFICANT CHANGE UP
OXYCODONE UR-MCNC: NEGATIVE NG/ML — SIGNIFICANT CHANGE UP
OXYMORPHONE (OPIATES), UR RESULT: NEGATIVE NG/ML — SIGNIFICANT CHANGE UP
OXYMORPHONE UR CFM-MCNC: NEGATIVE NG/ML — SIGNIFICANT CHANGE UP
PLATELET # BLD AUTO: 344 K/UL — SIGNIFICANT CHANGE UP (ref 130–400)
POTASSIUM SERPL-MCNC: 4.6 MMOL/L — SIGNIFICANT CHANGE UP (ref 3.5–5)
POTASSIUM SERPL-SCNC: 4.6 MMOL/L — SIGNIFICANT CHANGE UP (ref 3.5–5)
PROT SERPL-MCNC: 5.3 G/DL — LOW (ref 6–8)
RBC # BLD: 3.77 M/UL — LOW (ref 4.7–6.1)
RBC # FLD: 18 % — HIGH (ref 11.5–14.5)
SODIUM SERPL-SCNC: 141 MMOL/L — SIGNIFICANT CHANGE UP (ref 135–146)
TIBC SERPL-MCNC: 279 UG/DL — SIGNIFICANT CHANGE UP (ref 220–430)
TSH SERPL-MCNC: 1.23 UIU/ML — SIGNIFICANT CHANGE UP (ref 0.27–4.2)
UIBC SERPL-MCNC: 263 UG/DL — SIGNIFICANT CHANGE UP (ref 110–370)
WBC # BLD: 8.08 K/UL — SIGNIFICANT CHANGE UP (ref 4.8–10.8)
WBC # FLD AUTO: 8.08 K/UL — SIGNIFICANT CHANGE UP (ref 4.8–10.8)

## 2021-12-16 PROCEDURE — 99239 HOSP IP/OBS DSCHRG MGMT >30: CPT

## 2021-12-16 PROCEDURE — 93010 ELECTROCARDIOGRAM REPORT: CPT

## 2021-12-16 PROCEDURE — 99232 SBSQ HOSP IP/OBS MODERATE 35: CPT

## 2021-12-16 PROCEDURE — 71045 X-RAY EXAM CHEST 1 VIEW: CPT | Mod: 26

## 2021-12-16 RX ORDER — MAGNESIUM SULFATE 500 MG/ML
1 VIAL (ML) INJECTION ONCE
Refills: 0 | Status: COMPLETED | OUTPATIENT
Start: 2021-12-16 | End: 2021-12-16

## 2021-12-16 RX ORDER — NALOXONE HYDROCHLORIDE 4 MG/.1ML
0.4 SPRAY NASAL
Refills: 0 | Status: DISCONTINUED | OUTPATIENT
Start: 2021-12-16 | End: 2021-12-16

## 2021-12-16 RX ORDER — NALOXONE HYDROCHLORIDE 4 MG/.1ML
0.08 SPRAY NASAL
Qty: 2 | Refills: 0 | Status: DISCONTINUED | OUTPATIENT
Start: 2021-12-16 | End: 2021-12-16

## 2021-12-16 RX ADMIN — CHLORHEXIDINE GLUCONATE 1 APPLICATION(S): 213 SOLUTION TOPICAL at 12:18

## 2021-12-16 RX ADMIN — PANTOPRAZOLE SODIUM 40 MILLIGRAM(S): 20 TABLET, DELAYED RELEASE ORAL at 12:18

## 2021-12-16 RX ADMIN — Medication 100 GRAM(S): at 10:33

## 2021-12-16 NOTE — DISCHARGE NOTE PROVIDER - CARE PROVIDER_API CALL
Dao Thomas (MD)  Internal Medicine  [address] [place] [place] [address]  Phone: [phone]  Fax: [phone]  Follow Up Time:

## 2021-12-16 NOTE — PROGRESS NOTE ADULT - ASSESSMENT
40 yo W male w/ PMH: as noted below.  Pt found obtunded . pt  BIBA .  IN ER pt stated " i took 2 hits fentanyl".  Pt was given total 3 doses  IV narcan.  ICU was consulted for diminished respirations (6/min) and  monitoring of pt's respiratory status.  pt now being place on narcan IVPB in ER.  Narcan drip weaned off during day. Pt. seen by addiction medicine - went to detox in past does not want to go now.    Today is hospital day 1d. This morning he is resting comfortably in bed and reports no new issues or overnight events.   off naracn drip since last night  urine drug screen - positive for benzo, opiate, methadone, ampetamine         40 yo W male w/ PMH: as noted below.  Pt found obtunded . pt  BIBA .  IN ER pt stated " i took 2 hits fentanyl".  Pt was given total 3 doses  IV narcan.  ICU was consulted for diminished respirations (6/min) and  monitoring of pt's respiratory status.  pt now being place on narcan IVPB in ER.  Narcan drip weaned off during day. Pt. seen by addiction medicine - went to detox in past does not want to go now.    Today is hospital day 1d. This morning he is resting comfortably in bed and reports no new issues or overnight events.   off naracn drip since last night  urine drug screen - positive for benzo, opiate, methadone, ampetamine    #drug overdose s/p narcan drip  -d/c iv fluids  -addiction medicine consult appreciated Pt. does not want to go to detox  -dg to medicine  -procal/tsh  -f/u seru, drug screen

## 2021-12-16 NOTE — PROGRESS NOTE ADULT - ASSESSMENT
IMPRESSION:    Altered Mental Status, resolved  Opioid overdose, improved  HO CAP & Aspiration PNA  HO Complicated Parapneumonic Effusion/Empyema SP VATS/open decortication      PLAN:    CNS:  No depressants.   Off Narcan drip.   UDS positive for benzos, amphetamine, methadone & opiate.   FU SDS and fentanyl metabolites.  CT Head.  One to one when more alert.   Addiction medicine.    HEENT: Oral care    PULMONARY:  HOB @ 45 degrees.  Aspiration precautions.  Target RR 12 - 16.  Wean supplemental O2.  NIV if hypercarbic.    CARDIOVASCULAR:  DC IVF.  Keep I = O.     GI: GI prophylaxis.  Feeding as tolerated and per S&S.  Bowel regimen     RENAL:  Follow up lytes.  Correct as needed.  Bladder US q12h.  Monitor UO.    INFECTIOUS DISEASE:  Monitor off Abx.  Procalcitonin.  Follow up cultures    HEMATOLOGICAL:  DVT prophylaxis.    ENDOCRINE:  Follow up FS.  Insulin protocol if needed.  TSH.    MUSCULOSKELETAL:  OOB to chair    Monitor in MICU IMPRESSION:    Altered Mental Status, resolved  Opioid overdose, improved  HO CAP & Aspiration PNA  HO Complicated Parapneumonic Effusion/Empyema SP VATS/open decortication      PLAN:    CNS:  No depressants.   Off Narcan drip.   UDS positive for benzos, amphetamine, methadone & opiate.   FU SDS and fentanyl metabolites.  CT Head.  One to one when more alert.   Addiction medicine.    HEENT: Oral care    PULMONARY:  HOB @ 45 degrees.  Aspiration precautions.  Target RR 12 - 16.  Wean supplemental O2.  NIV if hypercarbic.    CARDIOVASCULAR:  DC IVF.  Keep I = O.     GI: GI prophylaxis.  Feeding as tolerated and per S&S.  Bowel regimen     RENAL:  Follow up lytes.  Correct as needed.  Bladder US q12h.  Monitor UO.    INFECTIOUS DISEASE:  Monitor off Abx.  Procalcitonin.  Follow up cultures    HEMATOLOGICAL:  DVT prophylaxis.    ENDOCRINE:  Follow up FS.  Insulin protocol if needed.  TSH.    MUSCULOSKELETAL:  OOB to chair    Downgrade to floor IMPRESSION:    Altered Mental Status, resolved  Opioid overdose, improved  Microcytic Anemia  HO CAP & Aspiration PNA  HO Complicated Parapneumonic Effusion/Empyema SP VATS/open decortication      PLAN:    CNS:  No depressants.   Off Narcan drip.   UDS positive for benzos, amphetamine, methadone & opiate.   FU SDS and fentanyl metabolites.  One to one when more alert.   Addiction medicine.    HEENT: Oral care    PULMONARY:  HOB @ 45 degrees.  Aspiration precautions.  Target RR 12 - 16.  Wean supplemental O2.  NIV if hypercarbic.    CARDIOVASCULAR:  DC IVF.  Keep I = O.     GI: GI prophylaxis.  Feeding as tolerated and per S&S.  Bowel regimen.  Needs GI as outpatient    RENAL:  Follow up lytes.  Correct as needed.  Bladder US q12h.  Monitor UO.    INFECTIOUS DISEASE:  Anemia workup, iron studies.  Monitor off Abx.  Procalcitonin.  Follow up cultures    HEMATOLOGICAL:  DVT prophylaxis.    ENDOCRINE:  Follow up FS.  Insulin protocol if needed.  TSH.    MUSCULOSKELETAL:  OOB to chair    Downgrade to floor

## 2021-12-16 NOTE — DISCHARGE NOTE NURSING/CASE MANAGEMENT/SOCIAL WORK - PATIENT PORTAL LINK FT
You can access the FollowMyHealth Patient Portal offered by [facility] by registering at the following website: [email or web address]. By joining [employer]’s FollowMyHealth portal, you will also be able to view your health information using other applications (apps) compatible with our system.

## 2021-12-16 NOTE — DISCHARGE NOTE PROVIDER - NSDCFUSCHEDAPPT_GEN_ALL_CORE_FT
PAVAN SOUZA ; 12/16/2021 ; hospitals  PAVAN SOUZA ; 01/14/2022 ; [facility] [address] ; 02/04/2022 ; [facility] [address] PAVAN SOUZA ; 01/14/2022 ; [facility] [id number] ; 02/04/2022 ; [facility] [address]

## 2021-12-16 NOTE — DISCHARGE NOTE PROVIDER - NSDCCPCAREPLAN_GEN_ALL_CORE_FT
PRINCIPAL DISCHARGE DIAGNOSIS  Diagnosis: Opiate overdose  Assessment and Plan of Treatment: You had an overdose requiring narcan. It was reccomended for a detox progrqm please follow up with detox.

## 2021-12-16 NOTE — PROGRESS NOTE ADULT - ASSESSMENT
42 yo male, pmh of substance abuse, pleural effusion s/p VATS presents to ed for overdose, pt snorted 2 bags of heroin,  was found in apartment staircase, lethargic but alert per ems. In ED pt placed on Narcan drip and admitted to ICU    Opiate and benzo overdose - resolved     - pt this am alert and oriented off Narcan drip   - electronic chart review reveals multiple hospital visits for OD   - detox consult - pt is not interested in detox program   - may DC home today   - 34min spent on DC

## 2021-12-16 NOTE — DISCHARGE NOTE PROVIDER - NSDCMRMEDTOKEN_GEN_ALL_CORE_FT
acetaminophen 325 mg oral tablet: 2 tab(s) orally every 6 hours, As Needed  clindamycin 300 mg oral capsule: 2 cap(s) orally 3 times a day   pantoprazole 40 mg oral delayed release tablet: 1 tab(s) orally every 12 hours  sucralfate 1 g/10 mL oral suspension: 10 milliliter(s) orally every 6 hours   sucralfate 1 g/10 mL oral suspension: 10 milliliter(s) orally every 6 hours

## 2021-12-16 NOTE — PROGRESS NOTE ADULT - SUBJECTIVE AND OBJECTIVE BOX
Patient is a 41y old  Male who presents with a chief complaint of opiate overdose (15 Dec 2021 11:12)    Over Night Events:  No overnight events.  Off Narcan drip.    ROS:   All ROS are negative except HPI     PHYSICAL EXAM  ICU Vital Signs Last 24 Hrs  T(C): 36.6 (16 Dec 2021 07:01), Max: 37.2 (15 Dec 2021 11:00)  T(F): 97.9 (16 Dec 2021 07:01), Max: 99 (15 Dec 2021 11:00)  HR: 75 (16 Dec 2021 04:00) (75 - 92)  BP: 111/70 (16 Dec 2021 04:00) (111/70 - 131/83)  BP(mean): 86 (16 Dec 2021 04:00) (81 - 102)  RR: 13 (16 Dec 2021 07:01) (11 - 25)  SpO2: 98% (16 Dec 2021 04:00) (95% - 100%)    CONSTITUTIONAL:  Well nourished.  NAD    ENT:   Airway patent,   Mouth with normal mucosa.   No thrush    EYES:   pupils dilated & equal,   round and reactive to light    CARDIAC:   Normal rate,  Regular rhythm.    Heart sounds S1, S2  No edema    RESPIRATORY:   No wheezing   Normal chest expansion  No use of accessory muscles    GASTROINTESTINAL:  Abdomen soft   Non-tender,   No guarding,   + BS    MUSCULOSKELETAL:   Range of motion is not limited,  No clubbing, cyanosis    NEUROLOGICAL:   Alert and oriented  follow command   No motor or sensory deficits.    SKIN:   Skin normal color for race,   Warm and dry  No evidence of rash.      12-15-21 @ 07:01  -  12-16-21 @ 07:00  --------------------------------------------------------  IN:    Naloxone: 585 mL    Naloxone: 95 mL    sodium chloride 0.9%: 2200 mL  Total IN: 2880 mL    OUT:    Voided (mL): 1500 mL  Total OUT: 1500 mL    Total NET: 1380 mL      12-16-21 @ 07:01  -  12-16-21 @ 08:56  --------------------------------------------------------  IN:    sodium chloride 0.9%: 200 mL  Total IN: 200 mL    OUT:  Total OUT: 0 mL    Total NET: 200 mL      LABS:                        8.0    8.08  )-----------( 344      ( 16 Dec 2021 06:18 )             27.7                                               12-16    141  |  104  |  13  ----------------------------<  83  4.6   |  27  |  0.8    Ca    8.4<L>      16 Dec 2021 06:18  Mg     1.8     12-16    TPro  5.3<L>  /  Alb  3.2<L>  /  TBili  <0.2  /  DBili  x   /  AST  10  /  ALT  12  /  AlkPhos  71  12-16                             CARDIAC MARKERS ( 15 Dec 2021 11:30 )  x     / x     / 105 U/L / x     / x      CARDIAC MARKERS ( 14 Dec 2021 23:40 )  x     / <0.01 ng/mL / x     / x     / x         LIVER FUNCTIONS - ( 16 Dec 2021 06:18 )  Alb: 3.2 g/dL / Pro: 5.3 g/dL / ALK PHOS: 71 U/L / ALT: 12 U/L / AST: 10 U/L / GGT: x                                              ABG - ( 15 Dec 2021 00:23 )  pH, Arterial: 7.41  pH, Blood: x     /  pCO2: 45    /  pO2: 115   / HCO3: 28    / Base Excess: 3.4   /  SaO2: 98.5      MEDICATIONS  (STANDING):  chlorhexidine 4% Liquid 1 Application(s) Topical daily  heparin   Injectable 5000 Unit(s) SubCutaneous every 8 hours  pantoprazole  Injectable 40 milliGRAM(s) IV Push daily  sodium chloride 0.9%. 1000 milliLiter(s) (100 mL/Hr) IV Continuous <Continuous>    MEDICATIONS  (PRN):
SUBJECTIVE:    Patient is a 41y old Male who presents with a chief complaint of opiate overdose (15 Dec 2021 11:12)    Currently admitted to medicine with the primary diagnosis of Opiate overdose       Today is hospital day 1d. This morning he is resting comfortably in bed and reports no new issues or overnight events.   off naracn drip since last night  urine drug screen - positive for benzo, opiate, methadone, ampetamine        PAST MEDICAL & SURGICAL HISTORY  Peptic ulcer disease  GIB    Polysubstance abuse    H/O chest tube placement  empyema      SOCIAL HISTORY:  Negative for smoking/alcohol/drug use.     ALLERGIES:  No Known Allergies    MEDICATIONS:  STANDING MEDICATIONS  chlorhexidine 4% Liquid 1 Application(s) Topical daily  heparin   Injectable 5000 Unit(s) SubCutaneous every 8 hours  magnesium sulfate  IVPB 1 Gram(s) IV Intermittent once  pantoprazole  Injectable 40 milliGRAM(s) IV Push daily  sodium chloride 0.9%. 1000 milliLiter(s) IV Continuous <Continuous>    PRN MEDICATIONS    VITALS:   T(F): 97.9  HR: 75  BP: 111/70  RR: 13  SpO2: 98%    LABS:                        8.0    8.08  )-----------( 344      ( 16 Dec 2021 06:18 )             27.7     12-16    141  |  104  |  13  ----------------------------<  83  4.6   |  27  |  0.8    Ca    8.4<L>      16 Dec 2021 06:18  Mg     1.8     12-16    TPro  5.3<L>  /  Alb  3.2<L>  /  TBili  <0.2  /  DBili  x   /  AST  10  /  ALT  12  /  AlkPhos  71  12-16        ABG - ( 15 Dec 2021 00:23 )  pH, Arterial: 7.41  pH, Blood: x     /  pCO2: 45    /  pO2: 115   / HCO3: 28    / Base Excess: 3.4   /  SaO2: 98.5              Creatine Kinase, Serum: 105 U/L (12-15-21 @ 11:30)      CARDIAC MARKERS ( 15 Dec 2021 11:30 )  x     / x     / 105 U/L / x     / x      CARDIAC MARKERS ( 14 Dec 2021 23:40 )  x     / <0.01 ng/mL / x     / x     / x          RADIOLOGY:  Chest X-ray:  Xray Chest 1 View- PORTABLE-Routine:   ACC: [id number] EXAM:  XR CHEST PORTABLE  ROUTINE 1V                          PROCEDURE DATE:  12/16/2021          INTERPRETATION:  Clinical History / Reason for exam: Follow-up.    Comparison : Chest radiograph December 14, 2021.    Technique/Positioning: Low lung volume.    Findings:    Support devices: None.    Cardiac/mediastinum/hilum: Magnified.    Lung parenchyma/Pleura: Within normal limits.    Skeleton/soft tissues: Stable.    Impression:    Low lung volume.    No acute infiltrates.    --- End of Report ---        JH MAXWELL MD; Attending Radiologist  This document has been electronically signed. Dec 16 2021  7:03AM (12-16-21 @ 05:53)    PHYSICAL EXAM:  GEN: No acute distress  LUNGS: Clear to auscultation bilaterally   HEART: Regular  ABD: Soft, non-tender, non-distended.  EXT: NC/NC/NE/2+PP/MATOS/Skin Intact.   NEURO: AAOX3    Intravenous access:   NG tube:   Santillan Catheter:       
 Patient is awake and alert off narcan      T(F): 97.9 (12-16-21 @ 07:01), Max: 99 (12-15-21 @ 11:00)  HR: 80 (12-16-21 @ 08:00)  BP: 119/82 (12-16-21 @ 08:00)  RR: 15 (12-16-21 @ 08:00)  SpO2: 100% (12-16-21 @ 08:00) (95% - 100%)    PHYSICAL EXAM:  GENERAL: NAD  HEAD:  Atraumatic, Normocephalic  EYES: EOMI, PERRLA, conjunctiva and sclera clear  NERVOUS SYSTEM:  Alert & Oriented X3, no focal deficits   CHEST/LUNG: Clear to percussion bilaterally; No rales, rhonchi, wheezing, or rubs  HEART: Regular rate and rhythm; No murmurs, rubs, or gallops  ABDOMEN: Soft, Nontender, Nondistended; Bowel sounds present  EXTREMITIES:  2+ Peripheral Pulses, No clubbing, cyanosis, or edema    LABS  12-16    141  |  104  |  13  ----------------------------<  83  4.6   |  27  |  0.8    Ca    8.4<L>      16 Dec 2021 06:18  Mg     1.8     12-16    TPro  5.3<L>  /  Alb  3.2<L>  /  TBili  <0.2  /  DBili  x   /  AST  10  /  ALT  12  /  AlkPhos  71  12-16                          8.0    8.08  )-----------( 344      ( 16 Dec 2021 06:18 )             27.7     Opiate, Urine (12.15.21 @ 06:48)   Opiate, Urine: Positive   Benzodiazepine, Urine (12.15.21 @ 06:48)   Benzodiazepine, Urine: Positive   Amphetamine, Urine (12.15.21 @ 06:48)   Amphetamine, Urine: Positive     CARDIAC ENZYMES  Creatine Kinase, Serum: 105 (12-15 @ 11:30)    Troponin T, Serum: <0.01 ng/mL (12-14-21 @ 23:40)    RADIOLOGY  < from: Xray Chest 1 View- PORTABLE-Routine (Xray Chest  1 View- PORTABLE-Routine in AM.) (12.16.21 @ 05:53) >  No acute infiltrates.    < end of copied text >    MEDICATIONS  (STANDING):  chlorhexidine 4% Liquid 1 Application(s) Topical daily  heparin   Injectable 5000 Unit(s) SubCutaneous every 8 hours  pantoprazole  Injectable 40 milliGRAM(s) IV Push daily    MEDICATIONS  (PRN):  naloxone 1 mG/mL Injection for Intranasal Use 0.4 milliGRAM(s) IntraNasal every 3 minutes PRN if lethargiuc/ siugns of overdose    
 Patient is awake and alert on Narcan drip, reports he used heroin and Xanax yesterday, denies depression or suicidal ideation  stating "I just wanted to get high"      T(F): 98.5 (12-15-21 @ 07:01), Max: 98.5 (12-15-21 @ 07:01)  HR: 85 (12-15-21 @ 05:59)  BP: 138/77 (12-15-21 @ 05:59)  RR: 19 (12-15-21 @ 07:01)  SpO2: 99% (12-15-21 @ 05:59) (95% - 99%)    PHYSICAL EXAM:  GENERAL: NAD  HEAD:  Atraumatic, Normocephalic  EYES: EOMI, PERRLA, conjunctiva and sclera clear  NERVOUS SYSTEM:  Alert & Oriented X3, no focal deficits   CHEST/LUNG: Clear to percussion bilaterally; No rales, rhonchi, wheezing, or rubs  HEART: Regular rate and rhythm; No murmurs, rubs, or gallops  ABDOMEN: Soft, Nontender, Nondistended; Bowel sounds present  EXTREMITIES:  2+ Peripheral Pulses, No clubbing, cyanosis, or edema    LABS  12-14    135  |  97<L>  |  28<H>  ----------------------------<  98  4.9   |  25  |  1.3    Ca    8.7      14 Dec 2021 23:40  Mg     2.1     12-14    TPro  6.7  /  Alb  4.1  /  TBili  0.2  /  DBili  x   /  AST  28  /  ALT  18  /  AlkPhos  99  12-14                          8.7    13.33 )-----------( 440      ( 14 Dec 2021 23:40 )             30.0         CARDIAC ENZYMES    Troponin T, Serum: <0.01 ng/mL (12-14-21 @ 23:40)      RADIOLOGY  < from: Xray Chest 1 View-PORTABLE IMMEDIATE (12.14.21 @ 23:29) >    Impression:    Low lung volume.    No acute infiltrates.    < end of copied text >    MEDICATIONS  (STANDING):  chlorhexidine 4% Liquid 1 Application(s) Topical daily  heparin   Injectable 5000 Unit(s) SubCutaneous every 8 hours  naloxone Infusion 0.18 mG/Hr (45 mL/Hr) IV Continuous <Continuous>  pantoprazole  Injectable 40 milliGRAM(s) IV Push daily  sodium chloride 0.9%. 1000 milliLiter(s) (100 mL/Hr) IV Continuous <Continuous>    MEDICATIONS  (PRN):

## 2021-12-16 NOTE — DISCHARGE NOTE PROVIDER - HOSPITAL COURSE
42 yo W male w/ PMH: as noted below.  Pt found obtunded . pt  BIBA .  IN ER pt stated " i took 2 hits fentanyl".  Pt was given total 3 doses  IV narcan.  ICU was consulted for diminished respirations (6/min) and  monitoring of pt's respiratory status.  pt now being place on narcan IVPB in ER.  Narcan drip weaned off during day. Pt. seen by addiction medicine - went to detox in past does not want to go now. 40 yo W male w/ PMH: as noted below.  Pt found obtunded . pt  BIBA .  IN ER pt stated " i took 2 hits fentanyl".  Pt was given total 3 doses  IV narcan.  ICU was consulted for diminished respirations (6/min) and  monitoring of pt's respiratory status.  pt now being place on narcan IVPB in ER.  Narcan drip weaned off during day. Pt. seen by addiction medicine - went to detox in past does not want to go now.  Pt. ambulated with nursing staff and is stable for d/c.

## 2021-12-17 LAB
AMPHET UR QL SCN: POSITIVE
AMPHET UR QL SCN: SIGNIFICANT CHANGE UP NG/ML
AMPHETAMINE IN-HOUSE INTERPRETATION: POSITIVE
AMPHETAMINE, UR RESULT: SIGNIFICANT CHANGE UP NG/ML
EDDP UR QL CFM: 9935 NG/ML — SIGNIFICANT CHANGE UP
EDDP, UR RESULT: 9935 NG/ML — SIGNIFICANT CHANGE UP
FERRITIN SERPL-MCNC: 19 NG/ML — LOW (ref 30–400)
MDA UR QL SCN: NEGATIVE NG/ML — SIGNIFICANT CHANGE UP
MDA, UR RESULT: NEGATIVE NG/ML — SIGNIFICANT CHANGE UP
MDEA, UR RESULT: NEGATIVE NG/ML — SIGNIFICANT CHANGE UP
MDMA UR QL SCN: NEGATIVE NG/ML — SIGNIFICANT CHANGE UP
MDMA, UR RESULT: NEGATIVE NG/ML — SIGNIFICANT CHANGE UP
METHADONE IN-HOUSE INTERPRETATION: POSITIVE
METHADONE UR CFM-MCNC: POSITIVE
METHAMPHET UR QL SCN: NEGATIVE NG/ML — SIGNIFICANT CHANGE UP
METHAMPHETAMINE, UR RESULT: NEGATIVE NG/ML — SIGNIFICANT CHANGE UP
PROCALCITONIN SERPL-MCNC: 0.13 NG/ML — HIGH (ref 0.02–0.1)
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
## 2021-12-21 DIAGNOSIS — F11.10 OPIOID ABUSE, UNCOMPLICATED: ICD-10-CM

## 2021-12-21 DIAGNOSIS — F15.10 OTHER STIMULANT ABUSE, UNCOMPLICATED: ICD-10-CM

## 2021-12-21 DIAGNOSIS — R41.82 ALTERED MENTAL STATUS, UNSPECIFIED: ICD-10-CM

## 2021-12-21 DIAGNOSIS — F13.10 SEDATIVE, HYPNOTIC OR ANXIOLYTIC ABUSE, UNCOMPLICATED: ICD-10-CM
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
## 2021-12-21 DIAGNOSIS — T40.2X1A POISONING BY OTHER OPIOIDS, ACCIDENTAL (UNINTENTIONAL), INITIAL ENCOUNTER: ICD-10-CM

## 2021-12-21 DIAGNOSIS — T42.4X1A POISONING BY BENZODIAZEPINES, ACCIDENTAL (UNINTENTIONAL), INITIAL ENCOUNTER: ICD-10-CM

## 2021-12-21 DIAGNOSIS — D50.9 IRON DEFICIENCY ANEMIA, UNSPECIFIED: ICD-10-CM

## 2021-12-21 DIAGNOSIS — T40.411A POISONING BY FENTANYL OR FENTANYL ANALOGS, ACCIDENTAL (UNINTENTIONAL), INITIAL ENCOUNTER: ICD-10-CM

## 2021-12-21 DIAGNOSIS — F10.10 ALCOHOL ABUSE, UNCOMPLICATED: ICD-10-CM

## 2021-12-21 LAB
ALFENTANIL UR QUANT: SIGNIFICANT CHANGE UP NG/MG CREAT
CREAT UR-MCNC: 132 MG/DL — SIGNIFICANT CHANGE UP
DRUG SCREEN, SERUM: ABNORMAL
FENTANYL UR CFM-MCNC: 11 NG/MG CREAT — SIGNIFICANT CHANGE UP
FENTANYL UR CFM-MCNC: ABNORMAL
NORFENTANYL UR-MCNC: 61 NG/MG CREAT — SIGNIFICANT CHANGE UP
SUFENTANIL UR QUANT: SIGNIFICANT CHANGE UP NG/MG CREAT

## 2021-12-31 LAB
FENTANYL SERPL-MCNC: 0.5 NG/ML — LOW (ref 1–3)
NORFENTANYL SERPL-MCNC: NEGATIVE NG/ML — SIGNIFICANT CHANGE UP

## 2022-01-14 ENCOUNTER — APPOINTMENT (OUTPATIENT)
Dept: GASTROENTEROLOGY | Facility: CLINIC | Age: 42
End: 2022-01-14

## 2022-01-20 PROBLEM — F19.10 OTHER PSYCHOACTIVE SUBSTANCE ABUSE, UNCOMPLICATED: Chronic | Status: ACTIVE | Noted: 2021-12-15

## 2022-01-28 ENCOUNTER — NON-APPOINTMENT (OUTPATIENT)
Age: 42
End: 2022-01-28

## 2022-08-26 ENCOUNTER — APPOINTMENT (OUTPATIENT)
Dept: PULMONOLOGY | Facility: CLINIC | Age: 42
End: 2022-08-26

## 2022-11-22 NOTE — ED ADULT NURSE NOTE - NS ED NURSE RECORD ANOTHER VITAL SIGN
[facility] has not yet replied to your PA request. Depending on the information you've provided, additional questions may be returned by the plan. You may close this dialog, return to your dashboard, and perform other tasks.    To check for an update later, open this request again from your dashboard.    If [facility] has not replied to your request within 24 hours please contact [facility] [facility] at [phone].   Yes

## 2023-07-06 NOTE — CONSULT NOTE ADULT - SUBJECTIVE AND OBJECTIVE BOX
40y old Male  with opiate use disorder, admitted for anemia, not currently in withdrawal, CATCH team Addiction Medicine consult placed for service and support. Team is made aware and [facility]ATCH team  will follow up with patient.    Medical Necessity Information: It is in your best interest to select a reason for this procedure from the list below. All of these items fulfill various CMS LCD requirements except the new and changing color options. Add 52 Modifier (Optional): no Medical Necessity Clause: This procedure was medically necessary because the lesions that were treated were: Consent: The patient's consent was obtained including but not limited to risks of crusting, scabbing, blistering, scarring, darker or lighter pigmentary change, recurrence, incomplete removal and infection. Post-Care Instructions: I reviewed with the patient in detail post-care instructions. Patient is to wear sunprotection, and avoid picking at any of the treated lesions. Pt may apply Vaseline to crusted or scabbing areas. Anesthesia Volume In Cc: 0.2 Treatment Number (Will Not Render If 0): 0 Detail Level: Detailed

## 2023-12-20 NOTE — DISCHARGE NOTE NURSING/CASE MANAGEMENT/SOCIAL WORK - NSDCPEFALRISK_GEN_ALL_CORE
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  Patient initially presented with development of a draining sinus track.  ORIF was performed many years ago.  He went to the OR on 11/3 for hardware removal along with debridement.  Cultures isolated MRSA.  Susceptibilities reviewed.  Patient currently on Coumadin and so unable to take Bactrim.  Has been taking clindamycin but organism resistant.  Fortunately he is not showing any signs of relapse currently.  Suspect that this may have been a chronic progressive process as he reports previous draining tract prior to spinal surgery and radiology mentions abnormalities on 2022 images.  However based on imaging would question if patient may have seeded his spinal hardware.  We discussed avoidance of PICC line, plans for Dalvance and transition to suppression.     Patient received his 2 infusion of dalvance without issue.  He continues to do well.  Last set of labs stable.  Due to possible seeding of spinal hardware will transition to po doxycycline indefinitely.  Patient has yet to follow up with his Neurosurgeon but states he plans to do so early next year.  He cancelled his Ortho follow up as he was told if he was doing good he could do so.     Plan  -  transition to doxycycline 100 mg po bid on 1/3/2024 and continue indefinitely  - reviewed with patient to avoid prolonged sun expose while on doxycycline, stay upright for at least 30 minutes after taking the doxycycline, and wait 1-2 hours before and after doxcycline to take vitamins/supplements/dairy  - patient to follow up with Neurosurgery.   - CBCD, CMP 4-6 weeks  - RTO 4-6 weeks    H/O spinal fusion  Previous spinal fusion of L1-L5.  Would question if this site has been seeded given CT imaging with lucency at the L5 pedicle screw.  Noted on most recent CT imaging in September.  Would question if patient may have seeded this area.  Patient completed 2  "infusions of dalvance as above, now will be transitioned to suppressive doxycycline.     Diabetes mellitus type 2 in obese ([facility])    Lab Results   Component Value Date    HGBA1C 6.1 (H) 12/06/2023        Diagnoses and all orders for this visit:    Osteitis of symphysis pubis (HCC)  -     CBC and differential; Future  -     Comprehensive metabolic panel; Future  -     doxycycline hyclate (VIBRAMYCIN) 100 mg capsule; Take 1 capsule (100 mg total) by mouth every 12 (twelve) hours Do not start before January 3, 2024.    Hardware complicating wound infection, subsequent encounter  -     CBC and differential; Future  -     Comprehensive metabolic panel; Future  -     doxycycline hyclate (VIBRAMYCIN) 100 mg capsule; Take 1 capsule (100 mg total) by mouth every 12 (twelve) hours Do not start before January 3, 2024.    H/O spinal fusion    Diabetes mellitus type 2 in obese           Subjective:      Patient ID: Adolfo Munroe is a 64 y.o. male.    HPI  63 y/o male presents for office follow up today regarding pubic symphysis osteitis and hardware infection.  He received 2 dalvance infusions on 11/22 and 11/29.  He states he has no new complaints today.  His surgical incision has healed well.   The following portions of the patient's history were reviewed and updated as appropriate: allergies, current medications, past family history, past medical history, past social history, past surgical history, and problem list.    Review of Systems   Constitutional:  Negative for chills and fever.   Respiratory:  Negative for cough and shortness of breath.    Gastrointestinal:  Negative for abdominal pain, diarrhea, nausea and vomiting.   Skin:  Negative for rash.   Psychiatric/Behavioral:  Negative for behavioral problems and confusion.          Objective:      /70   Pulse 95   Temp (!) 96.8 °F (36 °C)   Resp 18   Ht 5' 6\" (1.676 m)   Wt 95.3 kg (210 lb)   SpO2 97%   BMI 33.89 kg/m²          Physical Exam  Vitals " reviewed.   Constitutional:       General: He is not in acute distress.     Appearance: Normal appearance. He is not ill-appearing, toxic-appearing or diaphoretic.   HENT:      Head: Normocephalic and atraumatic.   Eyes:      General: No scleral icterus.        Right eye: No discharge.         Left eye: No discharge.      Conjunctiva/sclera: Conjunctivae normal.   Cardiovascular:      Rate and Rhythm: Normal rate.   Pulmonary:      Effort: Pulmonary effort is normal. No respiratory distress.      Breath sounds: Normal breath sounds. No stridor. No wheezing, rhonchi or rales.   Chest:      Chest wall: No tenderness.   Abdominal:      General: Bowel sounds are normal. There is no distension.      Palpations: Abdomen is soft.      Tenderness: There is no abdominal tenderness.      Comments: Surgical incision well healed   Skin:     General: Skin is warm and dry.      Coloration: Skin is not jaundiced or pale.      Findings: No erythema or rash.   Neurological:      Mental Status: He is alert and oriented to person, place, and time.   Psychiatric:         Mood and Affect: Mood normal.         Behavior: Behavior normal.             Labs:   12/6/2023  Wbc: 6.76  Hgb: 13.4  Plt: 229  Cr: 1.17  LFTs: WNL   For information on Fall & Injury Prevention, visit: [email or web address]  [email or web address] OR  https://www.cdc.gov/steadi/patient.html

## 2024-04-05 NOTE — DIETITIAN INITIAL EVALUATION ADULT. - [facility] MALE
[employer] Alert Outreach Telephone Call Attempt     Patient is being outreached by the Care Transitions Program for a clinical alert from the [employer] monitoring program.     Call Attempt Date: 4/5/2024    Call Attempt: Second Attempt  
[phone]

## 2024-04-30 NOTE — ED ADULT NURSE NOTE - NSFALLRSKASSESSDT_ED_ALL_ED
- At OSH Hgb stable, thought to be anemia of chronic disease.   - Hgb last 10.2  - CTM Hgb 24-Nov-2021 22:50